# Patient Record
Sex: FEMALE | Race: BLACK OR AFRICAN AMERICAN | NOT HISPANIC OR LATINO | Employment: FULL TIME | URBAN - METROPOLITAN AREA
[De-identification: names, ages, dates, MRNs, and addresses within clinical notes are randomized per-mention and may not be internally consistent; named-entity substitution may affect disease eponyms.]

---

## 2017-01-19 ENCOUNTER — OFFICE VISIT (OUTPATIENT)
Dept: OBSTETRICS AND GYNECOLOGY | Facility: CLINIC | Age: 27
End: 2017-01-19

## 2017-01-19 VITALS
SYSTOLIC BLOOD PRESSURE: 114 MMHG | HEIGHT: 64 IN | BODY MASS INDEX: 47.97 KG/M2 | HEART RATE: 78 BPM | DIASTOLIC BLOOD PRESSURE: 78 MMHG | WEIGHT: 281 LBS

## 2017-01-19 DIAGNOSIS — N89.8 VAGINAL IRRITATION: Primary | ICD-10-CM

## 2017-01-19 DIAGNOSIS — A59.01 TRICHOMONIASIS OF VAGINA: ICD-10-CM

## 2017-01-19 PROCEDURE — 99213 OFFICE O/P EST LOW 20 MIN: CPT | Performed by: OBSTETRICS & GYNECOLOGY

## 2017-01-19 NOTE — MR AVS SNAPSHOT
Wanda Solis   2017 1:15 PM   Office Visit    Dept Phone:  [phone]   Encounter #:  [id number]    Provider:  Amor Michele MD   Department:  [facility]                Your Full Care Plan              Your Updated Medication List          This list is accurate as of: 17  4:28 PM.  Always use your most recent med list.                MICROGESTIN 1-20 MG-MCG per tablet   Generic drug:  norethindrone-ethinyl estradiol               We Performed the Following     NuSwab Vaginitis Plus       You Were Diagnosed With        Codes Comments    Vaginal irritation    -  Primary ICD-10-CM: N89.8  ICD-9-CM: 623.9     Trichomoniasis of vagina     ICD-10-CM: A59.01  ICD-9-CM: 131.01       Instructions     None    Patient Instructions History      Upcoming Appointments     Visit Type Date Time Department    GYN FOLLOW UP 2017  1:15 PM [facility] FABIÁNGYN ALEK SOLIS      [employer] Signup     [facility] [employer] allows you to send messages to your doctor, view your test results, renew your prescriptions, schedule appointments, and more. To sign up, go to [employer] and click on the Sign Up Now link in the New User? box. Enter your [employer] Activation Code exactly as it appears below along with the last four digits of your Social Security Number and your Date of Birth () to complete the sign-up process. If you do not sign up before the expiration date, you must request a new code.    [employer] Activation Code: [email or web address]  Expires: 2017  5:38 AM    If you have questions, you can email [email or web address] or call [phone] to talk to our [employer] staff. Remember, [employer] is NOT to be used for urgent needs. For medical emergencies, dial 911.               Other Info from Your Visit           Allergies     No Known Allergies      Reason for Visit     Follow-up LEIGHTON to Trich      Vital Signs     Blood Pressure Pulse Height Weight  "Last Menstrual Period Body Mass Index    114/78 78 64\" (162.6 cm) 281 lb (127 kg) 12/29/2016 (Approximate) 48.23 kg/m2    Smoking Status                   Never Smoker           Problems and Diagnoses Noted     Vaginal irritation    -  Primary    Trichomonas vaginitis            "

## 2017-01-19 NOTE — PROGRESS NOTES
"Colton Solis is a 26 y.o. female is here today for follow-up.    History of Present Illness     Pt here today to get test of cure for cure to Trich. Pt also c/o of vaginal itching today.     26 y.o. female with recent infection with Trichomoniasis   - here today for test of cure/re-infection  Has some irritation, possible brown discharge  No odor or other concerns.     Review of Systems   Constitutional: Negative for chills and fever.   Gastrointestinal: Negative for abdominal pain.   Genitourinary: Negative for pelvic pain, vaginal bleeding and vaginal discharge.       Objective    Visit Vitals   • /78   • Pulse 78   • Ht 64\" (162.6 cm)   • Wt 281 lb (127 kg)   • LMP 12/29/2016 (Approximate)   • BMI 48.23 kg/m2       Physical Exam   Constitutional: She appears well-developed and well-nourished. No distress.   Abdominal: Soft. Bowel sounds are normal. She exhibits no distension. There is no tenderness.   Genitourinary: Vagina normal and uterus normal. Pelvic exam was performed with patient prone. There is no lesion on the right labia. There is no lesion on the left labia. Uterus is not enlarged (retroverted), not fixed and not tender. Cervix exhibits no motion tenderness. Right adnexum displays no mass and no tenderness. Left adnexum displays no mass and no tenderness. No bleeding in the vagina. No vaginal discharge found.   Lymphadenopathy:        Right: No inguinal adenopathy present.        Left: No inguinal adenopathy present.         Assessment/Plan   Problems Addressed this Visit     None      Visit Diagnoses     Vaginal irritation    -  Primary    Relevant Orders    NuSwab Vaginitis Plus    Trichomoniasis of vagina        Relevant Orders    NuSwab Vaginitis Plus        Check NuSwab and treat based on results.     Amor Michele MD  1/19/2017  1:48 PM             "

## 2017-01-23 ENCOUNTER — TELEPHONE (OUTPATIENT)
Dept: OBSTETRICS AND GYNECOLOGY | Facility: CLINIC | Age: 27
End: 2017-01-23

## 2017-01-23 LAB
A VAGINAE DNA VAG QL NAA+PROBE: ABNORMAL SCORE
BVAB2 DNA VAG QL NAA+PROBE: ABNORMAL SCORE
C ALBICANS DNA VAG QL NAA+PROBE: POSITIVE
C GLABRATA DNA VAG QL NAA+PROBE: NEGATIVE
C TRACH RRNA SPEC QL NAA+PROBE: NEGATIVE
MEGA1 DNA VAG QL NAA+PROBE: ABNORMAL SCORE
N GONORRHOEA RRNA SPEC QL NAA+PROBE: NEGATIVE
T VAGINALIS RRNA SPEC QL NAA+PROBE: NEGATIVE

## 2017-01-23 RX ORDER — FLUCONAZOLE 150 MG/1
150 TABLET ORAL ONCE
Qty: 1 TABLET | Refills: 2 | Status: SHIPPED | OUTPATIENT
Start: 2017-01-23 | End: 2017-01-23

## 2017-01-23 NOTE — TELEPHONE ENCOUNTER
----- Message from Kalie Rodrigues sent at 1/23/2017 10:40 AM EST -----  Contact: Patient   Patient was notified of her culture results and RX.

## 2017-01-23 NOTE — TELEPHONE ENCOUNTER
----- Message from Amor Michele MD sent at 1/23/2017  9:52 AM EST -----  Wanda, her NuSwab shows yeast, medication sent to pharmacy. NO TRICH - Please let her know. Thanks Dr. Michele

## 2017-04-18 ENCOUNTER — OFFICE VISIT (OUTPATIENT)
Dept: OBSTETRICS AND GYNECOLOGY | Facility: CLINIC | Age: 27
End: 2017-04-18

## 2017-04-18 VITALS
HEART RATE: 93 BPM | WEIGHT: 290 LBS | BODY MASS INDEX: 49.51 KG/M2 | HEIGHT: 64 IN | SYSTOLIC BLOOD PRESSURE: 114 MMHG | DIASTOLIC BLOOD PRESSURE: 75 MMHG

## 2017-04-18 DIAGNOSIS — O20.0 THREATENED MISCARRIAGE: ICD-10-CM

## 2017-04-18 DIAGNOSIS — N89.8 VAGINAL DISCHARGE: Primary | ICD-10-CM

## 2017-04-18 PROCEDURE — 99214 OFFICE O/P EST MOD 30 MIN: CPT | Performed by: OBSTETRICS & GYNECOLOGY

## 2017-04-18 NOTE — PROGRESS NOTES
"Colton Solis is a 27 y.o. female c/o brown vaginal d/c for the past week. Pt only notices this when she wipes and looks like skin. Pt also had a positive pregnancy test on 04/10/2017.     History of Present Illness     27 y.o. female with one week history of brown discharge. No odor  No pelvic pain or cramping. Only notices when wiping   +UPT at home on 4/10/17     - LNMP 17 (cycles are regular, was on OCPs but not consistent with them) Would be 8 weeks 0 days by dates.     Meds - none  NKDA     Review of Systems   Constitutional: Negative for chills and fever.   Gastrointestinal: Negative for abdominal pain, nausea and vomiting.   Genitourinary: Positive for vaginal bleeding and vaginal discharge. Negative for pelvic pain.       Objective    /75  Pulse 93  Ht 64\" (162.6 cm)  Wt 290 lb (132 kg)  LMP 2017  Breastfeeding? No  BMI 49.78 kg/m2  Physical Exam   Constitutional: She appears well-developed and well-nourished. No distress.   Abdominal: Soft. Bowel sounds are normal. She exhibits no distension. There is no tenderness.   Genitourinary: Vagina normal and uterus normal. Pelvic exam was performed with patient prone. There is no lesion on the right labia. There is no lesion on the left labia. Uterus is not enlarged (difficult exam habitus and retroverted ), not fixed and not tender. Cervix exhibits no motion tenderness. Right adnexum displays no mass and no tenderness. Left adnexum displays no mass and no tenderness. No bleeding in the vagina. No vaginal discharge found.   Lymphadenopathy:        Right: No inguinal adenopathy present.        Left: No inguinal adenopathy present.         Assessment/Plan   Problems Addressed this Visit     None      Visit Diagnoses     Vaginal discharge    -  Primary    Relevant Orders    NuSwab VG+    Threatened miscarriage        Relevant Orders    HCG, B-subunit, Quantitative    Antibody Screen    ABO / Rh        1) brown " discharge  Check NuSwab  Treat based on results  2) Threatened miscarriage   Brown spotting as old blood?  Check ABO/Rh  Check HCG  Check ultrasound  Return for prenatal care.   Bleeding precautions reviewed.     Amor Michele MD  4/18/2017  12:03 PM

## 2017-04-19 ENCOUNTER — TELEPHONE (OUTPATIENT)
Dept: OBSTETRICS AND GYNECOLOGY | Facility: CLINIC | Age: 27
End: 2017-04-19

## 2017-04-19 LAB
ABO GROUP BLD: NORMAL
BLD GP AB SCN SERPL QL: NEGATIVE
HCG INTACT+B SERPL-ACNC: NORMAL MIU/ML
RH BLD: POSITIVE

## 2017-04-19 NOTE — TELEPHONE ENCOUNTER
----- Message from Wanda Chi MA sent at 4/19/2017  1:30 PM EDT -----  Nathan byrnes pt/young  ----- Message -----     From: Amor Michele MD     Sent: 4/19/2017  12:35 PM       To: KAJAL Estrella, her HCG is good in range for 5-7 weeks. Please let her know. Thanks Dr. Michele

## 2017-04-24 LAB
A VAGINAE DNA VAG QL NAA+PROBE: ABNORMAL SCORE
BVAB2 DNA VAG QL NAA+PROBE: ABNORMAL SCORE
C ALBICANS DNA VAG QL NAA+PROBE: NEGATIVE
C GLABRATA DNA VAG QL NAA+PROBE: NEGATIVE
C TRACH RRNA SPEC QL NAA+PROBE: NEGATIVE
MEGA1 DNA VAG QL NAA+PROBE: ABNORMAL SCORE
N GONORRHOEA RRNA SPEC QL NAA+PROBE: NEGATIVE
T VAGINALIS RRNA SPEC QL NAA+PROBE: NEGATIVE

## 2017-04-24 RX ORDER — METRONIDAZOLE 500 MG/1
500 TABLET ORAL 2 TIMES DAILY
Qty: 14 TABLET | Refills: 4 | Status: SHIPPED | OUTPATIENT
Start: 2017-04-24 | End: 2017-05-01

## 2017-04-25 ENCOUNTER — TELEPHONE (OUTPATIENT)
Dept: OBSTETRICS AND GYNECOLOGY | Facility: CLINIC | Age: 27
End: 2017-04-25

## 2017-04-25 ENCOUNTER — INITIAL PRENATAL (OUTPATIENT)
Dept: OBSTETRICS AND GYNECOLOGY | Facility: CLINIC | Age: 27
End: 2017-04-25

## 2017-04-25 ENCOUNTER — PROCEDURE VISIT (OUTPATIENT)
Dept: OBSTETRICS AND GYNECOLOGY | Facility: CLINIC | Age: 27
End: 2017-04-25

## 2017-04-25 VITALS — BODY MASS INDEX: 49.78 KG/M2 | DIASTOLIC BLOOD PRESSURE: 73 MMHG | SYSTOLIC BLOOD PRESSURE: 111 MMHG | WEIGHT: 290 LBS

## 2017-04-25 DIAGNOSIS — Z34.91 UNCERTAIN DATES, ANTEPARTUM, FIRST TRIMESTER: Primary | ICD-10-CM

## 2017-04-25 DIAGNOSIS — Z78.9 VARICELLA VACCINATION STATUS UNKNOWN: ICD-10-CM

## 2017-04-25 DIAGNOSIS — Z34.81 ENCOUNTER FOR SUPERVISION OF OTHER NORMAL PREGNANCY IN FIRST TRIMESTER: Primary | ICD-10-CM

## 2017-04-25 DIAGNOSIS — Z13.0 SCREENING FOR SICKLE-CELL DISEASE OR TRAIT: ICD-10-CM

## 2017-04-25 DIAGNOSIS — O99.211 OBESITY COMPLICATING PREGNANCY IN FIRST TRIMESTER: ICD-10-CM

## 2017-04-25 DIAGNOSIS — O99.211 OBESITY IN PREGNANCY, ANTEPARTUM, FIRST TRIMESTER: ICD-10-CM

## 2017-04-25 PROCEDURE — 76817 TRANSVAGINAL US OBSTETRIC: CPT | Performed by: OBSTETRICS & GYNECOLOGY

## 2017-04-25 PROCEDURE — 0501F PRENATAL FLOW SHEET: CPT | Performed by: OBSTETRICS & GYNECOLOGY

## 2017-04-25 RX ORDER — MAGNESIUM HYDROXIDE 400 MG/5ML
1 SUSPENSION, ORAL (FINAL DOSE FORM) ORAL DAILY
Qty: 30 CAPSULE | Refills: 11 | Status: SHIPPED | OUTPATIENT
Start: 2017-04-25 | End: 2017-05-25

## 2017-04-25 NOTE — PROGRESS NOTES
Initial ob visit     CC- Here for care of pregnancy     Wanda Solis is being seen today for her first obstetrical visit.  She is a 27 y.o.    9w0d gestation.     #: 1, Date: None, Sex: None, Weight: None, GA: None, Delivery: None, Apgar1: None, Apgar5: None, Living: None, Birth Comments: None    #: 2, Date: None, Sex: None, Weight: None, GA: None, Delivery: None, Apgar1: None, Apgar5: None, Living: None, Birth Comments: None    #: 3, Date: None, Sex: None, Weight: None, GA: None, Delivery: None, Apgar1: None, Apgar5: None, Living: None, Birth Comments: None    #: 4, Date: 09, Sex: Female, Weight: 7 lb 8 oz (3.402 kg), GA: 40w0d, Delivery: Vaginal, Spontaneous Delivery, Apgar1: None, Apgar5: None, Living: Yes, Birth Comments: None    #: 5, Date: None, Sex: None, Weight: None, GA: None, Delivery: None, Apgar1: None, Apgar5: None, Living: None, Birth Comments: None      Current obstetric complaints : none   Prior obstetric issues, potential pregnancy concerns: none  Family history of genetic issues (includes FOB): none  Prior infections concerning in pregnancy (Rash, fever in last 2 weeks): Trich in the past  Varicella Hx -negative history   Prior testing for Cystic Fibrosis Carrier or Sickle Cell Trait- unknown status  Prepregnancy BMI - Body mass index is 49.78 kg/(m^2).    Past Medical History:   Diagnosis Date   • Abnormal Pap smear of cervix    • Urogenital trichomoniasis        Past Surgical History:   Procedure Laterality Date   • DILATATION AND CURETTAGE     • WISDOM TOOTH EXTRACTION           Current Outpatient Prescriptions:   •  metroNIDAZOLE (FLAGYL) 500 MG tablet, Take 1 tablet by mouth 2 (Two) Times a Day for 7 days., Disp: 14 tablet, Rfl: 4    No Known Allergies    Social History     Social History   • Marital status: Unknown     Spouse name: N/A   • Number of children: N/A   • Years of education: N/A     Occupational History   • Not on file.     Social History Main Topics   • Smoking  status: Never Smoker   • Smokeless tobacco: Never Used   • Alcohol use No   • Drug use: No   • Sexual activity: Yes     Partners: Male     Birth control/ protection: None     Other Topics Concern   • Not on file     Social History Narrative       Family History   Problem Relation Age of Onset   • Breast cancer Neg Hx    • Ovarian cancer Neg Hx    • Uterine cancer Neg Hx    • Colon cancer Neg Hx    • Deep vein thrombosis Neg Hx    • Pulmonary embolism Neg Hx        Review of systems     A comprehensive review of systems was negative.     Objective    Wt 290 lb (132 kg)  LMP 02/21/2017  BMI 49.78 kg/m2      General Appearance:    Alert, cooperative, in no acute distress, habitus obese   Head:    Normocephalic, without obvious abnormality, atraumatic   Eyes:            Lids and lashes normal, conjunctivae and sclerae normal, no   icterus, no pallor, corneas clear   Ears:    Ears appear intact with no abnormalities noted       Neck:   No adenopathy, supple, trachea midline, no thyromegaly   Back:     No kyphosis present, no scoliosis present,                       Lungs:     Clear to auscultation,respirations regular, even and                   unlabored    Heart:    Regular rhythm and normal rate, normal S1 and S2, no            murmur, no gallop, no rub, no click   Breast Exam:    No masses, No nipple discharge   Abdomen:     Normal bowel sounds, no masses, no organomegaly, soft        non-tender, non-distended, no guarding, no rebound                 tenderness   Genitalia:    Vulva - BUS-WNL, NEFG    Vagina - No discharge, No bleeding    Cervix - No Lesions, closed     Uterus - Consistent with 7 weeks    Adnexa - No augustine, NT    Pelvimetry - clinically adequate, gynecoid pelvis     Extremities:   Moves all extremities well, no edema, no cyanosis, no              redness   Pulses:   Pulses palpable and equal bilaterally   Skin:   No bleeding, bruising or rash   Lymph nodes:   No palpable adenopathy   Neurologic:    Sensation intact, A&O times 3      Assessment    1) Pregnancy at 5w6d  2) Obesity in pregnancy, limit weight gain          Plan    Initial labs drawn, GC/CHL screen done  Patient is on Prenatal vitamins  Problem list reviewed and updated.  Reviewed routine prenatal care with the office to include but not limited to   Zika (travel restrictions/ok to use insect repellant), not to changing cat litter, food restrictions, avoidance of alcohol, tobacco and drugs and saunas/hot tubs.   All questions answered.     Amor Michele MD   4/25/2017  10:11 AM

## 2017-04-25 NOTE — TELEPHONE ENCOUNTER
----- Message from Amor Michele MD sent at 4/24/2017  4:45 PM EDT -----  Alin Muñoz - + for BV, sent Rx to pharmacy. Please let her know. Thanks Dr. Michele

## 2017-04-26 LAB
ABO GROUP BLD: (no result)
BASOPHILS # BLD AUTO: 0 X10E3/UL (ref 0–0.2)
BASOPHILS NFR BLD AUTO: 0 %
BLD GP AB SCN SERPL QL: NEGATIVE
EOSINOPHIL # BLD AUTO: 0.2 X10E3/UL (ref 0–0.4)
EOSINOPHIL NFR BLD AUTO: 2 %
ERYTHROCYTE [DISTWIDTH] IN BLOOD BY AUTOMATED COUNT: 15.2 % (ref 12.3–15.4)
HBV SURFACE AG SERPL QL IA: NEGATIVE
HCT VFR BLD AUTO: 38 % (ref 34–46.6)
HGB A MFR BLD: 54.9 % (ref 94–98)
HGB A2 MFR BLD COLUMN CHROM: 3.6 % (ref 0.7–3.1)
HGB BLD-MCNC: 13.1 G/DL (ref 11.1–15.9)
HGB C MFR BLD: 41.5 %
HGB F MFR BLD: 0 % (ref 0–2)
HGB FRACT BLD-IMP: ABNORMAL
HGB S BLD QL SOLY: NEGATIVE
HGB S MFR BLD: 0 %
HIV 1+2 AB+HIV1 P24 AG SERPL QL IA: NON REACTIVE
IMM GRANULOCYTES # BLD: 0 X10E3/UL (ref 0–0.1)
IMM GRANULOCYTES NFR BLD: 0 %
LYMPHOCYTES # BLD AUTO: 2.1 X10E3/UL (ref 0.7–3.1)
LYMPHOCYTES NFR BLD AUTO: 29 %
MCH RBC QN AUTO: 28.2 PG (ref 26.6–33)
MCHC RBC AUTO-ENTMCNC: 34.5 G/DL (ref 31.5–35.7)
MCV RBC AUTO: 82 FL (ref 79–97)
MONOCYTES # BLD AUTO: 0.5 X10E3/UL (ref 0.1–0.9)
MONOCYTES NFR BLD AUTO: 6 %
MORPHOLOGY BLD-IMP: (no result)
NEUTROPHILS # BLD AUTO: 4.6 X10E3/UL (ref 1.4–7)
NEUTROPHILS NFR BLD AUTO: 63 %
PLATELET # BLD AUTO: 276 X10E3/UL (ref 150–379)
RBC # BLD AUTO: 4.64 X10E6/UL (ref 3.77–5.28)
RH BLD: POSITIVE
RPR SER QL: NON REACTIVE
RUBV IGG SERPL IA-ACNC: 2.77 INDEX
VZV IGG SER IA-ACNC: <135 INDEX
WBC # BLD AUTO: 7.4 X10E3/UL (ref 3.4–10.8)

## 2017-04-27 ENCOUNTER — TELEPHONE (OUTPATIENT)
Dept: OBSTETRICS AND GYNECOLOGY | Facility: CLINIC | Age: 27
End: 2017-04-27

## 2017-04-27 PROBLEM — D58.2 HEMOGLOBIN C TRAIT (HCC): Status: ACTIVE | Noted: 2017-04-27

## 2017-04-27 NOTE — TELEPHONE ENCOUNTER
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 Need to check FOB for sickle trait if possible. Left message for her to call office. Thanks Dr. Michele

## 2017-05-30 ENCOUNTER — ROUTINE PRENATAL (OUTPATIENT)
Dept: OBSTETRICS AND GYNECOLOGY | Facility: CLINIC | Age: 27
End: 2017-05-30

## 2017-05-30 ENCOUNTER — PROCEDURE VISIT (OUTPATIENT)
Dept: OBSTETRICS AND GYNECOLOGY | Facility: CLINIC | Age: 27
End: 2017-05-30

## 2017-05-30 VITALS — BODY MASS INDEX: 49.44 KG/M2 | WEIGHT: 288 LBS | SYSTOLIC BLOOD PRESSURE: 119 MMHG | DIASTOLIC BLOOD PRESSURE: 79 MMHG

## 2017-05-30 DIAGNOSIS — Z34.81 ENCOUNTER FOR SUPERVISION OF OTHER NORMAL PREGNANCY IN FIRST TRIMESTER: Primary | ICD-10-CM

## 2017-05-30 DIAGNOSIS — O36.80X0 ENCOUNTER TO DETERMINE FETAL VIABILITY OF PREGNANCY, NOT APPLICABLE OR UNSPECIFIED FETUS: Primary | ICD-10-CM

## 2017-05-30 PROCEDURE — 0502F SUBSEQUENT PRENATAL CARE: CPT | Performed by: OBSTETRICS & GYNECOLOGY

## 2017-05-30 PROCEDURE — 76817 TRANSVAGINAL US OBSTETRIC: CPT | Performed by: OBSTETRICS & GYNECOLOGY

## 2017-05-30 RX ORDER — ASCORBIC ACID, CHOLECALCIFEROL, .ALPHA.-TOCOPHEROL, DL-, FOLIC ACID, PYRIDOXINE HYDROCHLORIDE, CYANOCOBALAMIN, CALCIUM FORMATE, FERROUS ASPARTO GLYCINATE, MAGNESIUM OXIDE AND DOCONEXENT 90; 400; 40; 1; 26; 25; 155; 18; 50; 300 MG/1; [IU]/1; [IU]/1; MG/1; MG/1; UG/1; MG/1; MG/1; MG/1; MG/1
CAPSULE, GELATIN COATED ORAL
COMMUNITY
Start: 2017-04-25 | End: 2017-11-16 | Stop reason: HOSPADM

## 2017-05-30 RX ORDER — ONDANSETRON 4 MG/1
4 TABLET, FILM COATED ORAL EVERY 8 HOURS PRN
Qty: 30 TABLET | Refills: 3 | Status: SHIPPED | OUTPATIENT
Start: 2017-05-30 | End: 2017-06-27

## 2017-05-30 RX ORDER — METRONIDAZOLE 7.5 MG/G
GEL VAGINAL 2 TIMES DAILY
Qty: 70 G | Refills: 3 | Status: SHIPPED | OUTPATIENT
Start: 2017-05-30 | End: 2017-06-27

## 2017-06-08 ENCOUNTER — TELEPHONE (OUTPATIENT)
Dept: OBSTETRICS AND GYNECOLOGY | Facility: CLINIC | Age: 27
End: 2017-06-08

## 2017-06-08 NOTE — TELEPHONE ENCOUNTER
Wanda,     Medication sent to the pharmacy.     Thanks   Dr. Michele    ---- Message from Meño Billy sent at 6/8/2017  3:38 PM EDT -----  PT called to report a yeast infection possibly following antibiotics that you prescribed. Inquired about having you call her in a prescription to her pharmacy. Pharmacy confirmed.

## 2017-06-27 ENCOUNTER — ROUTINE PRENATAL (OUTPATIENT)
Dept: OBSTETRICS AND GYNECOLOGY | Facility: CLINIC | Age: 27
End: 2017-06-27

## 2017-06-27 VITALS — DIASTOLIC BLOOD PRESSURE: 75 MMHG | BODY MASS INDEX: 50.12 KG/M2 | SYSTOLIC BLOOD PRESSURE: 127 MMHG | WEIGHT: 292 LBS

## 2017-06-27 DIAGNOSIS — Z36.9 ANTENATAL SCREENING ENCOUNTER: ICD-10-CM

## 2017-06-27 DIAGNOSIS — Z34.81 ENCOUNTER FOR SUPERVISION OF OTHER NORMAL PREGNANCY IN FIRST TRIMESTER: Primary | ICD-10-CM

## 2017-06-27 PROCEDURE — 0502F SUBSEQUENT PRENATAL CARE: CPT | Performed by: OBSTETRICS & GYNECOLOGY

## 2017-06-27 NOTE — PROGRESS NOTES
OB follow up     Wanda Solis is a 27 y.o.  14w6d being seen today for her obstetrical visit.  Patient reports boil in between her breast. Boil is draining and has an odor.  Fetal movement: absent.    Review of Systems  No bleeding, No cramping/contractions     /75  Wt 292 lb (132 kg)  LMP 2017  BMI 50.12 kg/m2    FHT: 166 BPM   Uterine Size: size equals dates       Assessment    1) pregnancy at 14w6d   Quickening reviewed.   Down syndrome testing, desires  Anatomy scan   2) Area between breasts looks like skin tag with secondary infection/inflammation.   Encouraged to treat after pregnancy         Plan    Reviewed this stage of pregnancy  Problem list updated   Follow up in 4 weeks    Amor Michele MD   2017  2:55 PM

## 2017-07-03 LAB
CHR X + Y ANEUP PLAS.CFDNA: ABNORMAL
CITATION REF LAB TEST: ABNORMAL
FET 13+18+21+X+Y ANEUP PLAS.CFDNA: ABNORMAL
FET CHR 13 TS PLAS.CFDNA QL: ABNORMAL
FET CHR 18 TS PLAS.CFDNA QL: ABNORMAL
FET CHR 21 TS PLAS.CFDNA QL: ABNORMAL
GENETIC ALGORITHM SENSITIVITY: ABNORMAL %
LAB DIRECTOR NAME PROVIDER: ABNORMAL
Lab: ABNORMAL
REF LAB TEST METHOD: ABNORMAL
SERVICE CMNT 02-IMP: ABNORMAL
SERVICE CMNT-IMP: ABNORMAL

## 2017-07-05 ENCOUNTER — TELEPHONE (OUTPATIENT)
Dept: OBSTETRICS AND GYNECOLOGY | Facility: CLINIC | Age: 27
End: 2017-07-05

## 2017-07-05 NOTE — TELEPHONE ENCOUNTER
----- Message from Wanda Chi MA sent at 7/3/2017  2:12 PM EDT -----  Nathan byrnes pt/young  ----- Message -----     From: Amor Michele MD     Sent: 7/3/2017  12:40 PM       To: KAJAL Estrella, let her know the down syndrome did not run (fetal DNA fraction too small), she can do MSAFP-4 at this point if desires. Thanks Dr. Michele

## 2017-07-07 ENCOUNTER — TELEPHONE (OUTPATIENT)
Dept: OBSTETRICS AND GYNECOLOGY | Facility: CLINIC | Age: 27
End: 2017-07-07

## 2017-07-07 NOTE — TELEPHONE ENCOUNTER
Please let patient know that she can make a sooner appointment with another provider for a vaginal swab to rule out infection since Dr. Michele is out next week or she can see him when he's back but she will need a swab to rule out infection.        Pt called in having a brown discharge (no odor) for about a week wants to know what she should do about the issue. Pt is a OB pt of Dr. Michele. Pt can be reached at [phone].    ANA METCALF [[id number]]      Thanks-    duyen

## 2017-07-07 NOTE — TELEPHONE ENCOUNTER
Pt is aware she will need a appt for the vaginal swab. She has carlos a appt with Dr. Osei 7/14/2017.    Thank you-      Mignon

## 2017-07-27 ENCOUNTER — PROCEDURE VISIT (OUTPATIENT)
Dept: OBSTETRICS AND GYNECOLOGY | Facility: CLINIC | Age: 27
End: 2017-07-27

## 2017-07-27 DIAGNOSIS — Z36.89 SCREENING, ANTENATAL, FOR FETAL ANATOMIC SURVEY: Primary | ICD-10-CM

## 2017-07-27 DIAGNOSIS — O99.212 OBESITY DURING PREGNANCY IN SECOND TRIMESTER: ICD-10-CM

## 2017-07-27 PROCEDURE — 76805 OB US >/= 14 WKS SNGL FETUS: CPT | Performed by: OBSTETRICS & GYNECOLOGY

## 2017-08-02 ENCOUNTER — ROUTINE PRENATAL (OUTPATIENT)
Dept: OBSTETRICS AND GYNECOLOGY | Facility: CLINIC | Age: 27
End: 2017-08-02

## 2017-08-02 VITALS — DIASTOLIC BLOOD PRESSURE: 69 MMHG | SYSTOLIC BLOOD PRESSURE: 123 MMHG | BODY MASS INDEX: 50.29 KG/M2 | WEIGHT: 293 LBS

## 2017-08-02 DIAGNOSIS — O26.892 VAGINAL DISCHARGE IN PREGNANCY IN SECOND TRIMESTER: ICD-10-CM

## 2017-08-02 DIAGNOSIS — N89.8 VAGINAL DISCHARGE IN PREGNANCY IN SECOND TRIMESTER: ICD-10-CM

## 2017-08-02 DIAGNOSIS — O99.212 OBESITY DURING PREGNANCY IN SECOND TRIMESTER: Primary | ICD-10-CM

## 2017-08-02 DIAGNOSIS — Z34.82 ENCOUNTER FOR SUPERVISION OF OTHER NORMAL PREGNANCY IN SECOND TRIMESTER: ICD-10-CM

## 2017-08-02 PROCEDURE — 0502F SUBSEQUENT PRENATAL CARE: CPT | Performed by: OBSTETRICS & GYNECOLOGY

## 2017-08-02 NOTE — PROGRESS NOTES
"OB follow up     Wanda Solis is a 27 y.o.  20w0d being seen today for her obstetrical visit.  Patient reports vaginal d/c . Pt thinks she may have a bacterial infection. Pt also c/o a \"boil\" on the inside of the R labia. . Fetal movement: normal.    Review of Systems  No bleeding, No cramping/contractions     /69  Wt 293 lb (133 kg)  LMP 2017  BMI 50.29 kg/m2    FHT: 152 BPM   Uterine Size: size equals dates       Assessment    1) pregnancy at 20w0d   Anatomy scan reviewed, normal.   Had cell free DNA testing run, but unable to do because of to small a fetal fraction  Offered MSAFP-4, not done  2) Obesity, weight stable.   3) Vaginal discharge in pregnancy, Nuswab done.   Treat per results.         Plan    Reviewed this stage of pregnancy  Problem list updated   Follow up in 4 weeks    Aomr Michlee MD   2017  9:28 AM    "

## 2017-08-05 RX ORDER — METRONIDAZOLE 500 MG/1
500 TABLET ORAL 2 TIMES DAILY
Qty: 14 TABLET | Refills: 4 | Status: SHIPPED | OUTPATIENT
Start: 2017-08-05 | End: 2017-08-12

## 2017-08-07 ENCOUNTER — TELEPHONE (OUTPATIENT)
Dept: OBSTETRICS AND GYNECOLOGY | Facility: CLINIC | Age: 27
End: 2017-08-07

## 2017-08-07 RX ORDER — METRONIDAZOLE 7.5 MG/G
GEL VAGINAL 2 TIMES DAILY
Qty: 70 G | Refills: 3 | Status: SHIPPED | OUTPATIENT
Start: 2017-08-07 | End: 2017-08-30

## 2017-08-07 NOTE — TELEPHONE ENCOUNTER
----- Message from Wanda Chi MA sent at 8/7/2017 11:34 AM EDT -----  Nathan byrnes pt/young  ----- Message -----     From: Amor Michele MD     Sent: 8/5/2017   4:19 PM       To: KAJAL Estrella, [facility] Medication sent to the pharmacy. Please let her know. Thanks Dr. Michele

## 2017-08-07 NOTE — TELEPHONE ENCOUNTER
Pt is aware.    Can you send the cream please.    Thanks    Wanda Muñoz,     I sent in the cream.     Thanks   Dr. Michele

## 2017-08-22 ENCOUNTER — TELEPHONE (OUTPATIENT)
Dept: OBSTETRICS AND GYNECOLOGY | Facility: CLINIC | Age: 27
End: 2017-08-22

## 2017-08-22 NOTE — TELEPHONE ENCOUNTER
Wanda,     I sent terazol. She is pregnant and I do not use diflucan in pregnancy.     Thanks   Dr. Michele    ----- Message from Ann-Marie Mason sent at 8/22/2017  1:00 PM EDT -----  Contact: Pt  Pt called to request rx for Diflucan.    Pt # [phone]

## 2017-08-22 NOTE — TELEPHONE ENCOUNTER
Wanda,     Sent in yeast cream  Please let her know    Thanks   Dr. Michele    ----- Message from Lizet Worley sent at 8/21/2017 10:41 AM EDT -----  Pt requesting RX for yeast infection, please advise.

## 2017-08-30 ENCOUNTER — ROUTINE PRENATAL (OUTPATIENT)
Dept: OBSTETRICS AND GYNECOLOGY | Facility: CLINIC | Age: 27
End: 2017-08-30

## 2017-08-30 VITALS — WEIGHT: 290 LBS | SYSTOLIC BLOOD PRESSURE: 115 MMHG | DIASTOLIC BLOOD PRESSURE: 71 MMHG | BODY MASS INDEX: 49.78 KG/M2

## 2017-08-30 DIAGNOSIS — Z34.82 ENCOUNTER FOR SUPERVISION OF OTHER NORMAL PREGNANCY IN SECOND TRIMESTER: Primary | ICD-10-CM

## 2017-08-30 DIAGNOSIS — Z36.9 ANTENATAL SCREENING ENCOUNTER: ICD-10-CM

## 2017-08-30 PROCEDURE — 0502F SUBSEQUENT PRENATAL CARE: CPT | Performed by: OBSTETRICS & GYNECOLOGY

## 2017-08-30 NOTE — PROGRESS NOTES
OB follow up     Wanda Solis is a 27 y.o.  24w0d being seen today for her obstetrical visit.  Patient reports no complaints. Fetal movement: normal.    Review of Systems  No bleeding, No cramping/contractions     /71  Wt 290 lb (132 kg)  LMP 2017  BMI 49.78 kg/m2    FHT: 156 BPM   Uterine Size: size equals dates       Assessment    1) pregnancy at 24w0d   Rh+, Glucola ordered  2) Obesity   Weight stable in pregnancy         Plan    Reviewed this stage of pregnancy  Problem list updated   Follow up in 3 weeks    Amor Michele MD   2017  12:18 PM

## 2017-09-20 ENCOUNTER — RESULTS ENCOUNTER (OUTPATIENT)
Dept: OBSTETRICS AND GYNECOLOGY | Facility: CLINIC | Age: 27
End: 2017-09-20

## 2017-09-20 ENCOUNTER — ROUTINE PRENATAL (OUTPATIENT)
Dept: OBSTETRICS AND GYNECOLOGY | Facility: CLINIC | Age: 27
End: 2017-09-20

## 2017-09-20 VITALS — WEIGHT: 292 LBS | BODY MASS INDEX: 50.12 KG/M2 | DIASTOLIC BLOOD PRESSURE: 73 MMHG | SYSTOLIC BLOOD PRESSURE: 110 MMHG

## 2017-09-20 DIAGNOSIS — Z34.82 ENCOUNTER FOR SUPERVISION OF OTHER NORMAL PREGNANCY IN SECOND TRIMESTER: Primary | ICD-10-CM

## 2017-09-20 DIAGNOSIS — O99.212 OBESITY DURING PREGNANCY IN SECOND TRIMESTER: ICD-10-CM

## 2017-09-20 DIAGNOSIS — Z36.9 ANTENATAL SCREENING ENCOUNTER: ICD-10-CM

## 2017-09-20 LAB — GLUCOSE 1H P 50 G GLC PO SERPL-MCNC: 136 MG/DL (ref 65–139)

## 2017-09-20 PROCEDURE — 0502F SUBSEQUENT PRENATAL CARE: CPT | Performed by: OBSTETRICS & GYNECOLOGY

## 2017-09-20 NOTE — PROGRESS NOTES
OB follow up     Wanda Solis is a 27 y.o.  27w0d being seen today for her obstetrical visit.  Patient reports no complaints. Fetal movement: normal.    Review of Systems  No bleeding, No cramping/contractions     /73  Wt 292 lb (132 kg)  LMP 2017  BMI 50.12 kg/m2    FHT: 134 BPM   Uterine Size: size equals dates       Assessment    1) pregnancy at 27w0d   Rh+, Glucola today  Peds, prenatal classes, tours, TDaP/flu shots, and breast feeding, anesthesia and Labor/Delivery questions  2) Considering sterilization - reviewed  3) Obesity, weight stable.         Plan    Reviewed this stage of pregnancy  Problem list updated   Follow up in 2 weeks    Amor Michele MD   2017  10:05 AM

## 2017-09-21 ENCOUNTER — TELEPHONE (OUTPATIENT)
Dept: OBSTETRICS AND GYNECOLOGY | Facility: CLINIC | Age: 27
End: 2017-09-21

## 2017-09-21 NOTE — TELEPHONE ENCOUNTER
----- Message from Amor Michele MD sent at 9/21/2017 10:25 AM EDT -----  Wanda, Her 1 hour is borderline. I want her to go ahead and do a 3 hour. Please advise and arrange. Thanks Dr. Michele

## 2017-10-04 ENCOUNTER — ROUTINE PRENATAL (OUTPATIENT)
Dept: OBSTETRICS AND GYNECOLOGY | Facility: CLINIC | Age: 27
End: 2017-10-04

## 2017-10-04 VITALS — SYSTOLIC BLOOD PRESSURE: 113 MMHG | WEIGHT: 293 LBS | BODY MASS INDEX: 50.29 KG/M2 | DIASTOLIC BLOOD PRESSURE: 78 MMHG

## 2017-10-04 DIAGNOSIS — O99.810 ABNORMAL GLUCOSE AFFECTING PREGNANCY: Primary | ICD-10-CM

## 2017-10-04 DIAGNOSIS — Z34.83 ENCOUNTER FOR SUPERVISION OF OTHER NORMAL PREGNANCY IN THIRD TRIMESTER: Primary | ICD-10-CM

## 2017-10-04 LAB
GLUCOSE 1H P 100 G GLC PO SERPL-MCNC: 144 MG/DL (ref 40–300)
GLUCOSE 2H P 100 G GLC PO SERPL-MCNC: 107 MG/DL (ref 40–300)
GLUCOSE 3H P 100 G GLC PO SERPL-MCNC: 94 MG/DL (ref 40–300)
GLUCOSE P FAST SERPL-MCNC: 79 MG/DL

## 2017-10-04 PROCEDURE — 0502F SUBSEQUENT PRENATAL CARE: CPT | Performed by: OBSTETRICS & GYNECOLOGY

## 2017-10-04 NOTE — PROGRESS NOTES
OB follow up     Wanda Solis is a 27 y.o.  29w0d being seen today for her obstetrical visit.  Patient reports no complaints. Fetal movement: normal.    Review of Systems  No bleeding, No cramping/contractions     /78  Wt 293 lb (133 kg)  LMP 2017  BMI 50.29 kg/m2    FHT: 154 BPM   Uterine Size: size equals dates       Assessment    1) pregnancy at 29w0d   2) 1 hour abnormal, testing, 3 hr GTT today.   3) Declines flu vaccine  4) Obesity - weight stable.         Plan    Reviewed this stage of pregnancy  Problem list updated   Follow up in 2 weeks    Amor Michele MD   10/4/2017  10:05 AM

## 2017-10-05 ENCOUNTER — TELEPHONE (OUTPATIENT)
Dept: OBSTETRICS AND GYNECOLOGY | Facility: CLINIC | Age: 27
End: 2017-10-05

## 2017-10-05 NOTE — TELEPHONE ENCOUNTER
----- Message from Amor Michele MD sent at 10/5/2017 10:26 AM EDT -----  Wanda, she passed her 3 hour. Please let her know. Thanks Dr. Michele

## 2017-10-18 ENCOUNTER — ROUTINE PRENATAL (OUTPATIENT)
Dept: OBSTETRICS AND GYNECOLOGY | Facility: CLINIC | Age: 27
End: 2017-10-18

## 2017-10-18 VITALS — WEIGHT: 293 LBS | DIASTOLIC BLOOD PRESSURE: 79 MMHG | BODY MASS INDEX: 50.46 KG/M2 | SYSTOLIC BLOOD PRESSURE: 113 MMHG

## 2017-10-18 DIAGNOSIS — Z34.83 ENCOUNTER FOR SUPERVISION OF OTHER NORMAL PREGNANCY IN THIRD TRIMESTER: Primary | ICD-10-CM

## 2017-10-18 DIAGNOSIS — O99.213 OBESITY IN PREGNANCY, ANTEPARTUM, THIRD TRIMESTER: ICD-10-CM

## 2017-10-18 PROCEDURE — 0502F SUBSEQUENT PRENATAL CARE: CPT | Performed by: OBSTETRICS & GYNECOLOGY

## 2017-10-18 NOTE — PROGRESS NOTES
OB follow up     Wanda Solis is a 27 y.o.  31w0d being seen today for her obstetrical visit.  Patient reports trouble breathing at night. . Fetal movement: normal.    Review of Systems  No bleeding, No cramping/contractions     /79  Wt 294 lb (133 kg)  LMP 2017  BMI 50.46 kg/m2    FHT: 136 BPM   Uterine Size: size equals dates       Assessment    1) pregnancy at 31w0d   Passed 3 hour.   2) Obesity, good weight gain, but check growth with next visit.         Plan    Reviewed this stage of pregnancy  Problem list updated   Follow up in 2 weeks    Amor Michele MD   10/18/2017  10:00 AM

## 2017-11-02 ENCOUNTER — ROUTINE PRENATAL (OUTPATIENT)
Dept: OBSTETRICS AND GYNECOLOGY | Facility: CLINIC | Age: 27
End: 2017-11-02

## 2017-11-02 ENCOUNTER — PROCEDURE VISIT (OUTPATIENT)
Dept: OBSTETRICS AND GYNECOLOGY | Facility: CLINIC | Age: 27
End: 2017-11-02

## 2017-11-02 VITALS — DIASTOLIC BLOOD PRESSURE: 72 MMHG | WEIGHT: 292 LBS | SYSTOLIC BLOOD PRESSURE: 116 MMHG | BODY MASS INDEX: 50.12 KG/M2

## 2017-11-02 DIAGNOSIS — O99.212 OBESITY AFFECTING PREGNANCY IN SECOND TRIMESTER: ICD-10-CM

## 2017-11-02 DIAGNOSIS — O99.213 OBESITY AFFECTING PREGNANCY IN THIRD TRIMESTER: ICD-10-CM

## 2017-11-02 DIAGNOSIS — Z34.83 ENCOUNTER FOR SUPERVISION OF OTHER NORMAL PREGNANCY IN THIRD TRIMESTER: Primary | ICD-10-CM

## 2017-11-02 DIAGNOSIS — Z36.89 ENCOUNTER FOR ULTRASOUND TO CHECK FETAL GROWTH: Primary | ICD-10-CM

## 2017-11-02 DIAGNOSIS — O99.213 OBESITY IN PREGNANCY, ANTEPARTUM, THIRD TRIMESTER: ICD-10-CM

## 2017-11-02 PROCEDURE — 76816 OB US FOLLOW-UP PER FETUS: CPT | Performed by: OBSTETRICS & GYNECOLOGY

## 2017-11-02 PROCEDURE — 0502F SUBSEQUENT PRENATAL CARE: CPT | Performed by: OBSTETRICS & GYNECOLOGY

## 2017-11-02 NOTE — PROGRESS NOTES
OB follow up     Wanda Solis is a 27 y.o.  33w1d being seen today for her obstetrical visit.  Patient reports nausea and vomiting. Fetal movement: normal.    Review of Systems  No bleeding, No cramping/contractions     /72  Wt 292 lb (132 kg)  LMP 2017  BMI 50.12 kg/m2    FHT: 156 BPM   Uterine Size: size equals dates       Assessment    1) pregnancy at 33w1d   Growth today 48%, A/C 50%, normal PROSPER, cephalic   2) Obesity - weight gain excellent.   3) watch proteinuria.       Plan    Reviewed this stage of pregnancy  Problem list updated   Follow up in 2 weeks    Amor Michele MD   2017  2:13 PM

## 2017-11-16 ENCOUNTER — ROUTINE PRENATAL (OUTPATIENT)
Dept: OBSTETRICS AND GYNECOLOGY | Facility: CLINIC | Age: 27
End: 2017-11-16

## 2017-11-16 VITALS — DIASTOLIC BLOOD PRESSURE: 66 MMHG | SYSTOLIC BLOOD PRESSURE: 107 MMHG | WEIGHT: 293 LBS | BODY MASS INDEX: 50.98 KG/M2

## 2017-11-16 DIAGNOSIS — O99.213 OBESITY AFFECTING PREGNANCY IN THIRD TRIMESTER: Primary | ICD-10-CM

## 2017-11-16 DIAGNOSIS — Z36.9 ANTENATAL SCREENING ENCOUNTER: ICD-10-CM

## 2017-11-16 DIAGNOSIS — Z34.83 ENCOUNTER FOR SUPERVISION OF OTHER NORMAL PREGNANCY IN THIRD TRIMESTER: ICD-10-CM

## 2017-11-16 PROCEDURE — 0502F SUBSEQUENT PRENATAL CARE: CPT | Performed by: OBSTETRICS & GYNECOLOGY

## 2017-11-16 NOTE — PROGRESS NOTES
Ob follow up    Wanda Solis is a 27 y.o.  35w1d patient being seen today for her obstetrical visit. Patient reports no complaints. Fetal movement: normal.      ROS - Denies leaking fluid, vaginal bleeding and notes good fetal movement.     /66  Wt 297 lb (135 kg)  LMP 2017  BMI 50.98 kg/m2    FHT:  145 BPM    Uterine Size: size equals dates   Presentations: cephalic   Pelvic Exam:     Dilation: 1cm    Effacement: 25%    Station:  -2                 Assessment    1) Pregnancy at 35w1d  2) Fetal status reassuring   3) GBS status - Done today   4) Obesity, weight stable.   Check BPP for BMI > 40   Consider IND for  -     Plan    Labor warnings   [facility]        Amor Michele MD   2017  2:37 PM

## 2017-11-18 LAB — GP B STREP DNA SPEC QL NAA+PROBE: POSITIVE

## 2017-11-20 PROBLEM — B95.1 POSITIVE GBS TEST: Status: ACTIVE | Noted: 2017-11-20

## 2017-11-21 ENCOUNTER — PROCEDURE VISIT (OUTPATIENT)
Dept: OBSTETRICS AND GYNECOLOGY | Facility: CLINIC | Age: 27
End: 2017-11-21

## 2017-11-21 ENCOUNTER — ROUTINE PRENATAL (OUTPATIENT)
Dept: OBSTETRICS AND GYNECOLOGY | Facility: CLINIC | Age: 27
End: 2017-11-21

## 2017-11-21 VITALS — BODY MASS INDEX: 50.91 KG/M2 | SYSTOLIC BLOOD PRESSURE: 114 MMHG | DIASTOLIC BLOOD PRESSURE: 72 MMHG | WEIGHT: 293 LBS

## 2017-11-21 DIAGNOSIS — O99.213 OBESITY COMPLICATING PREGNANCY IN THIRD TRIMESTER: Primary | ICD-10-CM

## 2017-11-21 DIAGNOSIS — O99.213 OBESITY AFFECTING PREGNANCY IN THIRD TRIMESTER: Primary | ICD-10-CM

## 2017-11-21 DIAGNOSIS — Z3A.35 35 WEEKS GESTATION OF PREGNANCY: ICD-10-CM

## 2017-11-21 PROCEDURE — 0502F SUBSEQUENT PRENATAL CARE: CPT | Performed by: OBSTETRICS & GYNECOLOGY

## 2017-11-21 PROCEDURE — 76819 FETAL BIOPHYS PROFIL W/O NST: CPT | Performed by: OBSTETRICS & GYNECOLOGY

## 2017-11-21 NOTE — PROGRESS NOTES
CC:  Pregnancy  Pt feels well, no issues or problems.  Reports good fetal movement.  BPP today was 8/8.  Labor precautions and fetal kick counts discussed with her.  Discussed positive GBS test and need for antibiotics in labor.  A/P:  Supervision of pregnancy complicated by obesity at 35 weeks  --F/U next week with Dr. Michele

## 2017-11-28 ENCOUNTER — PROCEDURE VISIT (OUTPATIENT)
Dept: OBSTETRICS AND GYNECOLOGY | Facility: CLINIC | Age: 27
End: 2017-11-28

## 2017-11-28 ENCOUNTER — ROUTINE PRENATAL (OUTPATIENT)
Dept: OBSTETRICS AND GYNECOLOGY | Facility: CLINIC | Age: 27
End: 2017-11-28

## 2017-11-28 VITALS — WEIGHT: 293 LBS | SYSTOLIC BLOOD PRESSURE: 109 MMHG | BODY MASS INDEX: 50.81 KG/M2 | DIASTOLIC BLOOD PRESSURE: 70 MMHG

## 2017-11-28 DIAGNOSIS — Z34.83 ENCOUNTER FOR SUPERVISION OF OTHER NORMAL PREGNANCY IN THIRD TRIMESTER: ICD-10-CM

## 2017-11-28 DIAGNOSIS — O99.213 OBESITY AFFECTING PREGNANCY IN THIRD TRIMESTER: Primary | ICD-10-CM

## 2017-11-28 DIAGNOSIS — B95.1 POSITIVE GBS TEST: ICD-10-CM

## 2017-11-28 PROCEDURE — 0502F SUBSEQUENT PRENATAL CARE: CPT | Performed by: OBSTETRICS & GYNECOLOGY

## 2017-11-28 PROCEDURE — 76819 FETAL BIOPHYS PROFIL W/O NST: CPT | Performed by: OBSTETRICS & GYNECOLOGY

## 2017-11-28 PROCEDURE — 76816 OB US FOLLOW-UP PER FETUS: CPT | Performed by: OBSTETRICS & GYNECOLOGY

## 2017-11-28 NOTE — PROGRESS NOTES
Ob follow up    Wanda Solis is a 27 y.o.  36w6d patient being seen today for her obstetrical visit. Patient reports cramping.. Fetal movement: normal.      ROS - Denies leaking fluid, vaginal bleeding and notes good fetal movement.     /70  Wt 296 lb (134 kg)  LMP 2017  BMI 50.81 kg/m2    FHT:  156BPM    Uterine Size: size equals dates   Presentations: cephalic   Pelvic Exam:     Dilation: 1cm    Effacement: 25%    Station:  -2                 Assessment    1) Pregnancy at 36w6d  2) Fetal status reassuring   3) GBS status - Positive  4) Obesity - Growth at 67%, A/C 88%, Normal PROSPER, cephalic and BPP    Continue to monitor BPP for BMI > 40       Plan    Labor warnings   [facility]        Amor Michele MD   2017  12:08 PM

## 2017-12-07 ENCOUNTER — PROCEDURE VISIT (OUTPATIENT)
Dept: OBSTETRICS AND GYNECOLOGY | Facility: CLINIC | Age: 27
End: 2017-12-07

## 2017-12-07 ENCOUNTER — ROUTINE PRENATAL (OUTPATIENT)
Dept: OBSTETRICS AND GYNECOLOGY | Facility: CLINIC | Age: 27
End: 2017-12-07

## 2017-12-07 VITALS — BODY MASS INDEX: 51.15 KG/M2 | SYSTOLIC BLOOD PRESSURE: 124 MMHG | WEIGHT: 293 LBS | DIASTOLIC BLOOD PRESSURE: 78 MMHG

## 2017-12-07 DIAGNOSIS — L73.2 HIDRADENITIS: ICD-10-CM

## 2017-12-07 DIAGNOSIS — B95.1 POSITIVE GBS TEST: ICD-10-CM

## 2017-12-07 DIAGNOSIS — O99.213 OBESITY AFFECTING PREGNANCY IN THIRD TRIMESTER, ANTEPARTUM: Primary | ICD-10-CM

## 2017-12-07 DIAGNOSIS — Z34.83 ENCOUNTER FOR SUPERVISION OF OTHER NORMAL PREGNANCY IN THIRD TRIMESTER: ICD-10-CM

## 2017-12-07 PROCEDURE — 76819 FETAL BIOPHYS PROFIL W/O NST: CPT | Performed by: OBSTETRICS & GYNECOLOGY

## 2017-12-07 PROCEDURE — 0502F SUBSEQUENT PRENATAL CARE: CPT | Performed by: OBSTETRICS & GYNECOLOGY

## 2017-12-07 NOTE — PROGRESS NOTES
Ob follow up    Wanda Solis is a 27 y.o.  38w1d patient being seen today for her obstetrical visit. Patient reports boil in the vaginal area. . Fetal movement: normal.      ROS - Denies leaking fluid, vaginal bleeding and notes good fetal movement.     /78  Wt 135 kg (298 lb)  LMP 2017  BMI 51.15 kg/m2    FHT:  146BPM    Uterine Size: size equals dates   Presentations: cephalic   Pelvic Exam:     Dilation: 2cm    Effacement: 50%    Station:  -2                 Assessment    1) Pregnancy at 38w1d  2) Fetal status reassuring   3) GBS status - Positive  4) hidradenitis - Treat with dicloxacillin  5) Obesity > 40 BMI   Induction next Thursday            Plan    Labor warnings   [facility]        Amor Michele MD   2017  3:17 PM

## 2017-12-14 ENCOUNTER — ANESTHESIA (OUTPATIENT)
Dept: LABOR AND DELIVERY | Facility: HOSPITAL | Age: 27
End: 2017-12-14

## 2017-12-14 ENCOUNTER — HOSPITAL ENCOUNTER (INPATIENT)
Facility: HOSPITAL | Age: 27
LOS: 3 days | Discharge: HOME | End: 2017-12-17
Attending: OBSTETRICS & GYNECOLOGY | Admitting: OBSTETRICS & GYNECOLOGY

## 2017-12-14 ENCOUNTER — ANESTHESIA EVENT (OUTPATIENT)
Dept: LABOR AND DELIVERY | Facility: HOSPITAL | Age: 27
End: 2017-12-14

## 2017-12-14 DIAGNOSIS — B95.1 POSITIVE GBS TEST: ICD-10-CM

## 2017-12-14 DIAGNOSIS — O99.213 OBESITY AFFECTING PREGNANCY IN THIRD TRIMESTER: Primary | ICD-10-CM

## 2017-12-14 LAB
ABO GROUP BLD: NORMAL
BASOPHILS # BLD AUTO: 0.01 10*3/MM3 (ref 0–0.2)
BASOPHILS NFR BLD AUTO: 0.1 % (ref 0–1.5)
BLD GP AB SCN SERPL QL: NEGATIVE
DEPRECATED RDW RBC AUTO: 48 FL (ref 37–54)
EOSINOPHIL # BLD AUTO: 0.12 10*3/MM3 (ref 0–0.7)
EOSINOPHIL NFR BLD AUTO: 1.7 % (ref 0.3–6.2)
ERYTHROCYTE [DISTWIDTH] IN BLOOD BY AUTOMATED COUNT: 16.1 % (ref 11.7–13)
HCT VFR BLD AUTO: 30 % (ref 35.6–45.5)
HGB BLD-MCNC: 9.9 G/DL (ref 11.9–15.5)
IMM GRANULOCYTES # BLD: 0.03 10*3/MM3 (ref 0–0.03)
IMM GRANULOCYTES NFR BLD: 0.4 % (ref 0–0.5)
LYMPHOCYTES # BLD AUTO: 1.71 10*3/MM3 (ref 0.9–4.8)
LYMPHOCYTES NFR BLD AUTO: 23.8 % (ref 19.6–45.3)
MCH RBC QN AUTO: 26.8 PG (ref 26.9–32)
MCHC RBC AUTO-ENTMCNC: 33 G/DL (ref 32.4–36.3)
MCV RBC AUTO: 81.3 FL (ref 80.5–98.2)
MONOCYTES # BLD AUTO: 0.43 10*3/MM3 (ref 0.2–1.2)
MONOCYTES NFR BLD AUTO: 6 % (ref 5–12)
NEUTROPHILS # BLD AUTO: 4.87 10*3/MM3 (ref 1.9–8.1)
NEUTROPHILS NFR BLD AUTO: 68 % (ref 42.7–76)
PLATELET # BLD AUTO: 221 10*3/MM3 (ref 140–500)
PMV BLD AUTO: 9.4 FL (ref 6–12)
RBC # BLD AUTO: 3.69 10*6/MM3 (ref 3.9–5.2)
RH BLD: POSITIVE
WBC NRBC COR # BLD: 7.17 10*3/MM3 (ref 4.5–10.7)

## 2017-12-14 PROCEDURE — 86901 BLOOD TYPING SEROLOGIC RH(D): CPT | Performed by: OBSTETRICS & GYNECOLOGY

## 2017-12-14 PROCEDURE — 25010000002 PENICILLIN G POTASSIUM PER 600000 UNITS: Performed by: OBSTETRICS & GYNECOLOGY

## 2017-12-14 PROCEDURE — 85027 COMPLETE CBC AUTOMATED: CPT | Performed by: OBSTETRICS & GYNECOLOGY

## 2017-12-14 PROCEDURE — C1755 CATHETER, INTRASPINAL: HCPCS | Performed by: ANESTHESIOLOGY

## 2017-12-14 PROCEDURE — 10907ZC DRAINAGE OF AMNIOTIC FLUID, THERAPEUTIC FROM PRODUCTS OF CONCEPTION, VIA NATURAL OR ARTIFICIAL OPENING: ICD-10-PCS | Performed by: OBSTETRICS & GYNECOLOGY

## 2017-12-14 PROCEDURE — 3E033VJ INTRODUCTION OF OTHER HORMONE INTO PERIPHERAL VEIN, PERCUTANEOUS APPROACH: ICD-10-PCS | Performed by: OBSTETRICS & GYNECOLOGY

## 2017-12-14 PROCEDURE — 86850 RBC ANTIBODY SCREEN: CPT | Performed by: OBSTETRICS & GYNECOLOGY

## 2017-12-14 PROCEDURE — 86900 BLOOD TYPING SEROLOGIC ABO: CPT | Performed by: OBSTETRICS & GYNECOLOGY

## 2017-12-14 RX ORDER — PROMETHAZINE HYDROCHLORIDE 25 MG/ML
12.5 INJECTION, SOLUTION INTRAMUSCULAR; INTRAVENOUS EVERY 6 HOURS PRN
Status: DISCONTINUED | OUTPATIENT
Start: 2017-12-14 | End: 2017-12-15 | Stop reason: HOSPADM

## 2017-12-14 RX ORDER — SODIUM CHLORIDE 0.9 % (FLUSH) 0.9 %
1-10 SYRINGE (ML) INJECTION AS NEEDED
Status: DISCONTINUED | OUTPATIENT
Start: 2017-12-14 | End: 2017-12-15 | Stop reason: HOSPADM

## 2017-12-14 RX ORDER — DIPHENHYDRAMINE HYDROCHLORIDE 50 MG/ML
12.5 INJECTION INTRAMUSCULAR; INTRAVENOUS EVERY 8 HOURS PRN
Status: DISCONTINUED | OUTPATIENT
Start: 2017-12-14 | End: 2017-12-15 | Stop reason: HOSPADM

## 2017-12-14 RX ORDER — SODIUM CHLORIDE, SODIUM LACTATE, POTASSIUM CHLORIDE, CALCIUM CHLORIDE 600; 310; 30; 20 MG/100ML; MG/100ML; MG/100ML; MG/100ML
125 INJECTION, SOLUTION INTRAVENOUS CONTINUOUS
Status: DISCONTINUED | OUTPATIENT
Start: 2017-12-14 | End: 2017-12-15

## 2017-12-14 RX ORDER — BUTORPHANOL TARTRATE 1 MG/ML
1 INJECTION, SOLUTION INTRAMUSCULAR; INTRAVENOUS
Status: DISCONTINUED | OUTPATIENT
Start: 2017-12-14 | End: 2017-12-15 | Stop reason: HOSPADM

## 2017-12-14 RX ORDER — OXYTOCIN/RINGER'S LACTATE 30/500 ML
125 PLASTIC BAG, INJECTION (ML) INTRAVENOUS CONTINUOUS PRN
Status: COMPLETED | OUTPATIENT
Start: 2017-12-14 | End: 2017-12-15

## 2017-12-14 RX ORDER — ONDANSETRON 2 MG/ML
4 INJECTION INTRAMUSCULAR; INTRAVENOUS ONCE AS NEEDED
Status: DISCONTINUED | OUTPATIENT
Start: 2017-12-14 | End: 2017-12-15 | Stop reason: HOSPADM

## 2017-12-14 RX ORDER — FAMOTIDINE 10 MG/ML
20 INJECTION, SOLUTION INTRAVENOUS ONCE AS NEEDED
Status: DISCONTINUED | OUTPATIENT
Start: 2017-12-14 | End: 2017-12-15 | Stop reason: HOSPADM

## 2017-12-14 RX ORDER — PROMETHAZINE HYDROCHLORIDE 25 MG/1
12.5 TABLET ORAL EVERY 6 HOURS PRN
Status: DISCONTINUED | OUTPATIENT
Start: 2017-12-14 | End: 2017-12-15 | Stop reason: HOSPADM

## 2017-12-14 RX ORDER — PENICILLIN G 3000000 [IU]/50ML
3 INJECTION, SOLUTION INTRAVENOUS EVERY 4 HOURS
Status: DISCONTINUED | OUTPATIENT
Start: 2017-12-14 | End: 2017-12-15

## 2017-12-14 RX ORDER — OXYTOCIN/RINGER'S LACTATE 30/500 ML
999 PLASTIC BAG, INJECTION (ML) INTRAVENOUS ONCE
Status: COMPLETED | OUTPATIENT
Start: 2017-12-14 | End: 2017-12-15

## 2017-12-14 RX ORDER — EPHEDRINE SULFATE 50 MG/ML
5 INJECTION, SOLUTION INTRAVENOUS AS NEEDED
Status: DISCONTINUED | OUTPATIENT
Start: 2017-12-14 | End: 2017-12-15 | Stop reason: HOSPADM

## 2017-12-14 RX ORDER — PROMETHAZINE HYDROCHLORIDE 12.5 MG/1
12.5 SUPPOSITORY RECTAL EVERY 6 HOURS PRN
Status: DISCONTINUED | OUTPATIENT
Start: 2017-12-14 | End: 2017-12-15 | Stop reason: HOSPADM

## 2017-12-14 RX ORDER — LIDOCAINE HYDROCHLORIDE AND EPINEPHRINE 15; 5 MG/ML; UG/ML
INJECTION, SOLUTION EPIDURAL AS NEEDED
Status: DISCONTINUED | OUTPATIENT
Start: 2017-12-14 | End: 2017-12-15 | Stop reason: SURG

## 2017-12-14 RX ORDER — CARBOPROST TROMETHAMINE 250 UG/ML
250 INJECTION, SOLUTION INTRAMUSCULAR AS NEEDED
Status: DISCONTINUED | OUTPATIENT
Start: 2017-12-14 | End: 2017-12-15 | Stop reason: HOSPADM

## 2017-12-14 RX ORDER — SODIUM CHLORIDE, SODIUM LACTATE, POTASSIUM CHLORIDE, CALCIUM CHLORIDE 600; 310; 30; 20 MG/100ML; MG/100ML; MG/100ML; MG/100ML
999 INJECTION, SOLUTION INTRAVENOUS CONTINUOUS
Status: DISCONTINUED | OUTPATIENT
Start: 2017-12-14 | End: 2017-12-15

## 2017-12-14 RX ORDER — METHYLERGONOVINE MALEATE 0.2 MG/ML
200 INJECTION INTRAVENOUS ONCE AS NEEDED
Status: DISCONTINUED | OUTPATIENT
Start: 2017-12-14 | End: 2017-12-15 | Stop reason: HOSPADM

## 2017-12-14 RX ORDER — LIDOCAINE HYDROCHLORIDE 10 MG/ML
5 INJECTION, SOLUTION INFILTRATION; PERINEURAL AS NEEDED
Status: DISCONTINUED | OUTPATIENT
Start: 2017-12-14 | End: 2017-12-15 | Stop reason: HOSPADM

## 2017-12-14 RX ORDER — MISOPROSTOL 200 UG/1
800 TABLET ORAL AS NEEDED
Status: DISCONTINUED | OUTPATIENT
Start: 2017-12-14 | End: 2017-12-15 | Stop reason: HOSPADM

## 2017-12-14 RX ORDER — OXYTOCIN/RINGER'S LACTATE 30/500 ML
2-30 PLASTIC BAG, INJECTION (ML) INTRAVENOUS
Status: DISCONTINUED | OUTPATIENT
Start: 2017-12-14 | End: 2017-12-15 | Stop reason: HOSPADM

## 2017-12-14 RX ADMIN — Medication 96 ML/HR: at 17:58

## 2017-12-14 RX ADMIN — PENICILLIN G 3 MILLION UNITS: 3000000 INJECTION, SOLUTION INTRAVENOUS at 20:34

## 2017-12-14 RX ADMIN — SODIUM CHLORIDE, POTASSIUM CHLORIDE, SODIUM LACTATE AND CALCIUM CHLORIDE 125 ML/HR: 600; 310; 30; 20 INJECTION, SOLUTION INTRAVENOUS at 14:49

## 2017-12-14 RX ADMIN — SODIUM CHLORIDE, POTASSIUM CHLORIDE, SODIUM LACTATE AND CALCIUM CHLORIDE 999 ML/HR: 600; 310; 30; 20 INJECTION, SOLUTION INTRAVENOUS at 11:47

## 2017-12-14 RX ADMIN — PENICILLIN G 3 MILLION UNITS: 3000000 INJECTION, SOLUTION INTRAVENOUS at 17:00

## 2017-12-14 RX ADMIN — SODIUM CHLORIDE, POTASSIUM CHLORIDE, SODIUM LACTATE AND CALCIUM CHLORIDE 125 ML/HR: 600; 310; 30; 20 INJECTION, SOLUTION INTRAVENOUS at 17:51

## 2017-12-14 RX ADMIN — OXYTOCIN 2 MILLI-UNITS/MIN: 10 INJECTION, SOLUTION INTRAMUSCULAR; INTRAVENOUS at 12:20

## 2017-12-14 RX ADMIN — PENICILLIN G POTASSIUM 5 MILLION UNITS: 5000000 POWDER, FOR SOLUTION INTRAMUSCULAR; INTRAPLEURAL; INTRATHECAL; INTRAVENOUS at 12:30

## 2017-12-14 RX ADMIN — EPHEDRINE SULFATE 5 MG: 50 INJECTION, SOLUTION INTRAVENOUS at 18:14

## 2017-12-14 RX ADMIN — LIDOCAINE HYDROCHLORIDE AND EPINEPHRINE 4 ML: 15; 5 INJECTION, SOLUTION EPIDURAL at 17:57

## 2017-12-14 NOTE — H&P
[facility]  Obstetric History and Physical    Induction at term with obesity (BMI > 40)         Patient is a 27 y.o. female  currently at 39w1d, who presents with induction planned.    Her prenatal care was with Dr. Michele and was complicated by obesity.             Obstetric History       T1      L1     SAB0   TAB0   Ectopic0   Multiple0   Live Births1       # Outcome Date GA Lbr [place]/2nd Weight Sex Delivery Anes PTL Lv   5 Current            4 Term 09 40w0d  3402 g (7 lb 8 oz) F Vag-Spont EPI N DUANE   3 AB            2 SAB            1 Para                       Past Medical History:   Diagnosis Date   • Abnormal Pap smear of cervix    • Urogenital trichomoniasis           Past Surgical History:   Procedure Laterality Date   • DILATATION AND CURETTAGE     • WISDOM TOOTH EXTRACTION            No current facility-administered medications on file prior to encounter.      Current Outpatient Prescriptions on File Prior to Encounter   Medication Sig Dispense Refill   • dicloxacillin (DYNAPEN) 500 MG capsule Take 1 capsule by mouth 4 (Four) Times a Day for 7 days. 28 capsule 0        No Known Allergies       Social History     Social History   • Marital status: Unknown     Spouse name: N/A   • Number of children: N/A   • Years of education: N/A     Occupational History   • Not on file.     Social History Main Topics   • Smoking status: Never Smoker   • Smokeless tobacco: Never Used   • Alcohol use No   • Drug use: No   • Sexual activity: Yes     Partners: Male     Birth control/ protection: None     Other Topics Concern   • Not on file     Social History Narrative          Family History   Problem Relation Age of Onset   • Breast cancer Neg Hx    • Ovarian cancer Neg Hx    • Uterine cancer Neg Hx    • Colon cancer Neg Hx    • Deep vein thrombosis Neg Hx    • Pulmonary embolism Neg Hx         Review of Systems   Constitutional: Negative for chills and fever.   Gastrointestinal: Positive for  abdominal pain.   Genitourinary: Positive for pelvic pain. Negative for vaginal bleeding and vaginal discharge.   All other systems reviewed and are negative.      LMP 02/21/2017    Physical Exam   Constitutional: She is oriented to person, place, and time. She appears well-developed and well-nourished.   HENT:   Head: Normocephalic and atraumatic.   Eyes: Conjunctivae are normal.   Neck: Normal range of motion. Neck supple. No thyromegaly present.   Cardiovascular: Normal rate, regular rhythm and normal heart sounds.    No murmur heard.  Pulmonary/Chest: Effort normal and breath sounds normal.   Abdominal: Soft. Bowel sounds are normal.   Genitourinary: Uterus is enlarged (EFW 7.5#).   Musculoskeletal: She exhibits no edema.   Neurological: She is alert and oriented to person, place, and time.   Skin: No rash noted.   Psychiatric: She has a normal mood and affect. Her behavior is normal.       Presentation: Cephalic        Lab Results   Component Value Date    WBC 7.4 04/25/2017    HGB 13.1 04/25/2017    HCT 38.0 04/25/2017    MCV 82 04/25/2017     04/25/2017       Principal Problem:    Obesity affecting pregnancy in third trimester  Active Problems:    Obesity in pregnancy, antepartum, third trimester      Assessment & Plan    Assessment:  1. Pregnancy at 39w1d  2.  induction of labor  for obesity  with favorable cervix  3.  Obstetrical history significant for obesity .  4.  GBS status: .positive    Plan:  1. fetal and uterine monitoring  continuously, labor augmentation  Pitocin, analgesia with  epidural and antibiotic for GBS  2. Plan of care has been reviewed with patient and family  3.  Risks, benefits of treatment plan have been discussed.  4.  All questions have been answered.        Amor Michele MD  12/14/2017  10:48 AM

## 2017-12-14 NOTE — ANESTHESIA PREPROCEDURE EVALUATION
Anesthesia Evaluation     Patient summary reviewed   no history of anesthetic complications:         Airway   Dental      Pulmonary - negative pulmonary ROS and normal exam   Cardiovascular - negative cardio ROS and normal exam        Neuro/Psych  GI/Hepatic/Renal/Endo    (+) morbid obesity,     Musculoskeletal     Abdominal    Substance History      OB/GYN    (+) Pregnant,         Other        (-) blood dyscrasia                                  Anesthesia Plan    ASA 3     epidural     Anesthetic plan and risks discussed with patient.

## 2017-12-14 NOTE — PROGRESS NOTES
AROM - Clear  IUPC placed   Cx 3/70/-2  FHT reassuring.     Amor Michele MD  12/14/2017  4:52 PM

## 2017-12-14 NOTE — PLAN OF CARE
Problem: Patient Care Overview (Adult)  Goal: Adult Individualization and Mutuality    12/14/17 1425   Individualization   Patient Specific Preferences Epidural, Bottle feeding,    Patient Specific Goals Good pain control, healthy baby and mom   Patient Specific Interventions Good pain relief with Epidural    Mutuality/Individual Preferences   What Anxieties, Fears or Concerns Do You Have About Your Health or Care? SVE being uncomfortable   What Questions Do You Have About Your Health or Care? When can I get epidural   What Information Would Help Us Give You More Personalized Care? FOB/Mom does not want to cut cord

## 2017-12-14 NOTE — ANESTHESIA PROCEDURE NOTES
Labor Epidural    Patient location during procedure: OB  Performed By  Anesthesiologist: SUSAN BARNARD  Preanesthetic Checklist  Completed: patient identified, site marked, surgical consent, pre-op evaluation, timeout performed, IV checked, risks and benefits discussed and monitors and equipment checked  Additional Notes  Unable to palpate landmarks  Prep:  Pt Position:sitting  Sterile Tech:gloves, sterile barrier and cap  Prep:chlorhexidine gluconate and isopropyl alcohol  Monitoring:blood pressure monitoring, continuous pulse oximetry and EKG  Epidural Block Procedure:  Approach:midline  Guidance:palpation technique  Location:L2-L3  Needle Type:Tuohy  Needle Gauge:17  Loss of Resistance Medium: saline  Loss of Resistance: 8cm  Cath Depth at skin:15 cm  Paresthesia: transient and right  Aspiration:negative  Test Dose:negative  Number of Attempts: 1  Post Assessment:  Dressing:occlusive dressing applied and secured with tape  Pt Tolerance:patient tolerated the procedure well with no apparent complications  Complications:no

## 2017-12-15 PROCEDURE — 25010000002 PENICILLIN G POTASSIUM PER 600000 UNITS: Performed by: OBSTETRICS & GYNECOLOGY

## 2017-12-15 PROCEDURE — 0UQMXZZ REPAIR VULVA, EXTERNAL APPROACH: ICD-10-PCS | Performed by: OBSTETRICS & GYNECOLOGY

## 2017-12-15 PROCEDURE — 0KQM0ZZ REPAIR PERINEUM MUSCLE, OPEN APPROACH: ICD-10-PCS | Performed by: OBSTETRICS & GYNECOLOGY

## 2017-12-15 PROCEDURE — 59400 OBSTETRICAL CARE: CPT | Performed by: OBSTETRICS & GYNECOLOGY

## 2017-12-15 RX ORDER — PROMETHAZINE HYDROCHLORIDE 12.5 MG/1
12.5 TABLET ORAL EVERY 4 HOURS PRN
Status: DISCONTINUED | OUTPATIENT
Start: 2017-12-15 | End: 2017-12-17 | Stop reason: HOSPADM

## 2017-12-15 RX ORDER — DICLOXACILLIN SODIUM 250 MG/1
500 CAPSULE ORAL EVERY 6 HOURS SCHEDULED
Status: DISCONTINUED | OUTPATIENT
Start: 2017-12-15 | End: 2017-12-17 | Stop reason: HOSPADM

## 2017-12-15 RX ORDER — PRENATAL VIT NO.126/IRON/FOLIC 28MG-0.8MG
1 TABLET ORAL DAILY
Status: DISCONTINUED | OUTPATIENT
Start: 2017-12-15 | End: 2017-12-17 | Stop reason: HOSPADM

## 2017-12-15 RX ORDER — LANOLIN 100 %
OINTMENT (GRAM) TOPICAL
Status: DISCONTINUED | OUTPATIENT
Start: 2017-12-15 | End: 2017-12-17 | Stop reason: HOSPADM

## 2017-12-15 RX ORDER — IBUPROFEN 200 MG
TABLET ORAL EVERY 12 HOURS SCHEDULED
Status: DISCONTINUED | OUTPATIENT
Start: 2017-12-15 | End: 2017-12-17 | Stop reason: HOSPADM

## 2017-12-15 RX ORDER — ZOLPIDEM TARTRATE 5 MG/1
5 TABLET ORAL NIGHTLY PRN
Status: DISCONTINUED | OUTPATIENT
Start: 2017-12-15 | End: 2017-12-17 | Stop reason: HOSPADM

## 2017-12-15 RX ORDER — SODIUM CHLORIDE 0.9 % (FLUSH) 0.9 %
1-10 SYRINGE (ML) INJECTION AS NEEDED
Status: DISCONTINUED | OUTPATIENT
Start: 2017-12-15 | End: 2017-12-17 | Stop reason: HOSPADM

## 2017-12-15 RX ORDER — HYDROCODONE BITARTRATE AND ACETAMINOPHEN 5; 325 MG/1; MG/1
2 TABLET ORAL EVERY 6 HOURS PRN
Status: DISCONTINUED | OUTPATIENT
Start: 2017-12-15 | End: 2017-12-17 | Stop reason: HOSPADM

## 2017-12-15 RX ORDER — DOCUSATE SODIUM 100 MG/1
100 CAPSULE, LIQUID FILLED ORAL 2 TIMES DAILY
Status: DISCONTINUED | OUTPATIENT
Start: 2017-12-15 | End: 2017-12-17 | Stop reason: HOSPADM

## 2017-12-15 RX ORDER — MISOPROSTOL 200 UG/1
800 TABLET ORAL AS NEEDED
Status: DISCONTINUED | OUTPATIENT
Start: 2017-12-15 | End: 2017-12-17 | Stop reason: HOSPADM

## 2017-12-15 RX ORDER — IBUPROFEN 800 MG/1
800 TABLET ORAL EVERY 8 HOURS PRN
Status: DISCONTINUED | OUTPATIENT
Start: 2017-12-15 | End: 2017-12-17 | Stop reason: HOSPADM

## 2017-12-15 RX ORDER — BISACODYL 10 MG
10 SUPPOSITORY, RECTAL RECTAL DAILY PRN
Status: DISCONTINUED | OUTPATIENT
Start: 2017-12-16 | End: 2017-12-17 | Stop reason: HOSPADM

## 2017-12-15 RX ADMIN — Medication 1 TABLET: at 09:37

## 2017-12-15 RX ADMIN — HYDROCODONE BITARTRATE AND ACETAMINOPHEN 1 TABLET: 5; 325 TABLET ORAL at 16:25

## 2017-12-15 RX ADMIN — DOCUSATE SODIUM 100 MG: 100 CAPSULE, LIQUID FILLED ORAL at 09:37

## 2017-12-15 RX ADMIN — DOCUSATE SODIUM 100 MG: 100 CAPSULE, LIQUID FILLED ORAL at 19:00

## 2017-12-15 RX ADMIN — SODIUM CHLORIDE, POTASSIUM CHLORIDE, SODIUM LACTATE AND CALCIUM CHLORIDE 125 ML/HR: 600; 310; 30; 20 INJECTION, SOLUTION INTRAVENOUS at 00:13

## 2017-12-15 RX ADMIN — IBUPROFEN 800 MG: 800 TABLET ORAL at 12:21

## 2017-12-15 RX ADMIN — OXYTOCIN 999 ML/HR: 10 INJECTION INTRAVENOUS at 01:32

## 2017-12-15 RX ADMIN — DICLOXACILLIN SODIUM 500 MG: 250 CAPSULE ORAL at 19:01

## 2017-12-15 RX ADMIN — IBUPROFEN 800 MG: 800 TABLET ORAL at 04:25

## 2017-12-15 RX ADMIN — OXYTOCIN 125 ML/HR: 10 INJECTION INTRAVENOUS at 02:13

## 2017-12-15 RX ADMIN — BACITRACIN ZINC NEOMYCIN SULFATE POLYMYXIN B SULFATE: 400; 3.5; 5 OINTMENT TOPICAL at 21:40

## 2017-12-15 RX ADMIN — PRAMOXINE HYDROCHLORIDE AND HYDROCORTISONE ACETATE: 100; 100 AEROSOL, FOAM TOPICAL at 09:37

## 2017-12-15 RX ADMIN — DICLOXACILLIN SODIUM 500 MG: 250 CAPSULE ORAL at 06:55

## 2017-12-15 RX ADMIN — PENICILLIN G 3 MILLION UNITS: 3000000 INJECTION, SOLUTION INTRAVENOUS at 00:18

## 2017-12-15 RX ADMIN — BENZOCAINE AND MENTHOL: 20; .5 SPRAY TOPICAL at 09:37

## 2017-12-15 RX ADMIN — DICLOXACILLIN SODIUM 500 MG: 250 CAPSULE ORAL at 12:21

## 2017-12-15 NOTE — L&D DELIVERY NOTE
[facility]  Vaginal Delivery Note    Delivery    Predelivery Diagnoses: 1) Pregnancy at 39w2d                                          2) obesity in pregnancy                     Postdelivery Diagnoses 1) Pregnancy at 39w2d                                            2) obesity in pregnancy     Attending :  Amor Michele MD     Procedure : Obstetrical controlled vaginal delivery    Delivery Narrative :     Wanda Solis is a 27 y.o. year old  @ 39w2d estimated gestational age. She presented to L&D for induction - obesity at 39 weeks.   Her prenatal care was with Dr. Michele and was complicated by obesity.   Once on L&D her labor progressed well along the labor curve with the aid pitocin, amniotomy and epidural interventions.  Once she was to the second stage, I was called for delivery.     Upon arrive she was prepped in the lithotomy position, and was doing well in her pushing efforts.  With delivery of the fetal head in OA presentation, bulb suction was performed and using gentle downward traction the anterior shoulder delivered without difficulty. No nuchal cord noted.  The infant showed good cry and tone and was placed upon the Mother's abdomen.   After a thirty second delay, I then clamped the cord and it was cut by me.    Care of the infant was then turned over to the delivery team.   Cord blood was obtained and then using gentle pressure the placenta was delivered spontaneous/intact and noted to have 3 vessel cord.  At that point I undertook inspection of the perineum and vulva and I repaired bilateral labial lacerations and a second degree perineal laceration using 3-0 Monocryl in standard fashion with good hemostatic and cosmetic results.  This repair was difficult due to brisk vulvar bleeding from several sites, but eventually able to control through both repair and pressure.      After repair of all lacerations, the area was noted to be hemostatic and all sponge and needle counts were  correct. A vaginal exam and rectal exam showed no issues with retained equipment or suture in an abnormal place.      There was one family members noted to be in the room with this patient.            Delivery: Vaginal, Spontaneous Delivery     YOB: 2019    Time of Birth: 1:28 AM      Anesthesia: Epidural             EBL: 450 cc (from vulvar lacerations)           Infant    Findings: male  infant     Infant observations: Weight: No birth weight on file.   Length:    in  Observations/Comments:         Apgars: 7   @ 1 minute /    8   @ 5 minutes       Complications  none    Disposition  Mother to Mother Baby/Postpartum  in stable condition currently.  Baby to NBN  in stable condition currently.  Was grunting in room shortly after delivery and taken to NBN for observation with father of the baby.       Amor Michele MD  12/15/17  2:04 AM

## 2017-12-15 NOTE — NURSING NOTE
"Pt seen for 3  small open sores under left breast; one small sore under right breast and evidence of two healed areas.  Pt states she had these prior to coming to the hospital  Open areas are superficial with pink wound bed; scant amt of serous drainge but pt reports she had \"pus\" from the sores in the recent past.  Pt has been placed on oral antibiotic;  Recommend cleansing areas with warm soap and water, rinsing and drying well then applying small amt of topical antibiotic ointment, covering with dry gauze drsgs.  Not pressure related but may have started with moisture under breasts.     "

## 2017-12-15 NOTE — PLAN OF CARE
Problem: Patient Care Overview (Adult)  Goal: Plan of Care Review  Outcome: Ongoing (interventions implemented as appropriate)    12/15/17 0244   Coping/Psychosocial Response Interventions   Plan Of Care Reviewed With patient;spouse   Patient Care Overview   Progress progress toward functional goals as expected       Goal: Adult Individualization and Mutuality  Outcome: Ongoing (interventions implemented as appropriate)    17 1425 12/15/17 0244   Individualization   Patient Specific Preferences Epidural, Bottle feeding,  --    Patient Specific Goals Good pain control, healthy baby and mom --    Patient Specific Interventions Good pain relief with Epidural  --    Mutuality/Individual Preferences   What Anxieties, Fears or Concerns Do You Have About Your Health or Care? --  serperation from    What Questions Do You Have About Your Health or Care? When can I get epidural --    What Information Would Help Us Give You More Personalized Care? FOB/Mom does not want to cut cord --        Goal: Discharge Needs Assessment  Outcome: Ongoing (interventions implemented as appropriate)    17 1243 17 1250 12/15/17 0244   Discharge Needs Assessment   Concerns To Be Addressed --  --  no discharge needs identified;denies needs/concerns at this time   Discharge Disposition --  --  home or self-care   Living Environment   Transportation Available car --  --    Self-Care   Equipment Currently Used at Home --  none --          Problem: Labor (Cervical Ripen, Induct, Augment) (Adult,Obstetrics,Pediatric)  Goal: Signs and Symptoms of Listed Potential Problems Will be Absent or Manageable (Labor)  Outcome: Ongoing (interventions implemented as appropriate)    12/15/17 0244   Labor (Cervical Ripen, Induct, Augment)   Problems Assessed (Labor) all   Problems Present (Labor) none         Problem: Anesthesia/Analgesia, Neuraxial (Obstetrics)  Goal: Signs and Symptoms of Listed Potential Problems Will be Absent or  Manageable (Anesthesia/Analgesia, Neuraxial)  Outcome: Ongoing (interventions implemented as appropriate)    12/15/17 0244   Anesthesia/Analgesia, Neuraxial   Problems Assessed (Neuraxial Anesthesia/Analgesia, OB) all   Problems Present (Neuraxial Anesthesia/Analgesia, OB) category II (indeterminate)/category III (abnormal) fetal heart rate tracing;nausea/vomiting/pruritus         Problem: Fall Risk  (Adult,Obstetrics,Pediatric)  Goal: Identify Related Risk Factors and Signs and Symptoms  Outcome: Ongoing (interventions implemented as appropriate)    12/15/17 0244   Fall Risk    Fall Risk: Related Risk Factors balance change;medical devices;obesity;regional anesthesia    Fall Risk: Signs and Symptoms presence of fall risk factors       Goal: Absence of Maternal Fall  Outcome: Ongoing (interventions implemented as appropriate)    12/15/17 0244   Fall Risk  (Adult,Obstetrics,Pediatric)   Absence of Maternal Fall making progress toward outcome       Goal: Absence of [place] Fall/Drop  Outcome: Ongoing (interventions implemented as appropriate)    12/15/17 0244   Fall Risk  (Adult,Obstetrics,Pediatric)   Absence of  Fall/Drop making progress toward outcome

## 2017-12-15 NOTE — PLAN OF CARE
Problem: Patient Care Overview (Adult)  Goal: Plan of Care Review  Outcome: Ongoing (interventions implemented as appropriate)    12/15/17 0926   Coping/Psychosocial Response Interventions   Plan Of Care Reviewed With patient   Patient Care Overview   Progress progress toward functional goals as expected   Outcome Evaluation   Outcome Summary/Follow up Plan assessment wnl; breast absess covered with transparent film dressing; pain meds prn; IVF and FC continue this am       Goal: Adult Individualization and Mutuality  Outcome: Ongoing (interventions implemented as appropriate)  Goal: Discharge Needs Assessment  Outcome: Ongoing (interventions implemented as appropriate)    12/15/17 0926   Discharge Needs Assessment   Concerns To Be Addressed no discharge needs identified   Readmission Within The Last 30 Days no previous admission in last 30 days   Equipment Needed After Discharge none   Discharge Disposition home or self-care   Living Environment   Transportation Available car   Current Health   Anticipated Changes Related to Illness none   Self-Care   Equipment Currently Used at Home none         Problem: Postpartum, Vaginal Delivery (Adult)  Goal: Signs and Symptoms of Listed Potential Problems Will be Absent or Manageable (Postpartum, Vaginal Delivery)  Outcome: Ongoing (interventions implemented as appropriate)    12/15/17 0926   Postpartum, Vaginal Delivery   Problems Assessed (Postpartum Vaginal Delivery) all   Problems Present (Postpartum Vaginal Delivery) none

## 2017-12-15 NOTE — PROGRESS NOTES
Postpartum Progress Note      Status post Vaginal Delivery: Doing well postoperatively.     1) postpartum care immediately following delivery :    Routine care  2) Obesity  3) Skin lesions on breast - on dicloxacillin, dressing applied   These lesions appear more consistent with skin breakdown/ulcers than infection.  Requested wound consult to see if topical treatments may help    Rh status: O Positive  Rubella: immune  Gender: Male     Desires circumcision, however PPD 0 and in nursery at this time for respiratory concerns so will defer    Subjective     Postpartum Day 0: Vaginal delivery    The patient feels well. The patient denies emotional concerns. Pain is well controlled with current medications. The baby is in nursery for respiratory concerns. The patient is ambulating well. The patient is tolerating a normal diet.     RN informed me of aram on breast that were noted last night.  Patient reports they have been there for two weeks. Denies fevers or chills. Reports + drainage.  Has not tried any topical therapy for them. Had folliculitis in the groin that is now resolved.    Objective     Vital signs in last 24 hours:  Temp:  [98.4 °F (36.9 °C)-100.2 °F (37.9 °C)] 98.4 °F (36.9 °C)  Heart Rate:  [] 120  Resp:  [16-20] 16  BP: (0-160)/(0-92) 105/67      General:    alert, appears stated age and cooperative   Abdomen:  Soft, Non-tender    Lochia:  appropriate   Uterine Fundus:   firm   Ext    Edema not present   DVT Evaluation:  No evidence of DVT seen on physical exam.     Lab Results   Component Value Date    WBC 7.17 12/14/2017    HGB 9.9 (L) 12/14/2017    HCT 30.0 (L) 12/14/2017    MCV 81.3 12/14/2017     12/14/2017       Eli Lowry MD  12/15/2017  10:12 AM

## 2017-12-15 NOTE — PLAN OF CARE
Problem: Patient Care Overview (Adult)  Goal: Plan of Care Review  Outcome: Ongoing (interventions implemented as appropriate)    12/15/17 7900   Coping/Psychosocial Response Interventions   Plan Of Care Reviewed With patient;significant other   Patient Care Overview   Progress improving   Outcome Evaluation   Outcome Summary/Follow up Plan recovery ongoing, nella bst abscess redressed due to moisture, fluids ongoing for dark urine, pt seems iill c/o being hot .        Goal: Adult Individualization and Mutuality  Outcome: Ongoing (interventions implemented as appropriate)  Goal: Discharge Needs Assessment  Outcome: Ongoing (interventions implemented as appropriate)    Problem: Postpartum, Vaginal Delivery (Adult)  Goal: Signs and Symptoms of Listed Potential Problems Will be Absent or Manageable (Postpartum, Vaginal Delivery)  Outcome: Ongoing (interventions implemented as appropriate)

## 2017-12-16 LAB
BASOPHILS # BLD AUTO: 0.02 10*3/MM3 (ref 0–0.2)
BASOPHILS NFR BLD AUTO: 0.2 % (ref 0–1.5)
DEPRECATED RDW RBC AUTO: 48.9 FL (ref 37–54)
EOSINOPHIL # BLD AUTO: 0.19 10*3/MM3 (ref 0–0.7)
EOSINOPHIL NFR BLD AUTO: 1.7 % (ref 0.3–6.2)
ERYTHROCYTE [DISTWIDTH] IN BLOOD BY AUTOMATED COUNT: 16.6 % (ref 11.7–13)
HCT VFR BLD AUTO: 22.4 % (ref 35.6–45.5)
HGB BLD-MCNC: 7.5 G/DL (ref 11.9–15.5)
IMM GRANULOCYTES # BLD: 0.03 10*3/MM3 (ref 0–0.03)
IMM GRANULOCYTES NFR BLD: 0.3 % (ref 0–0.5)
LYMPHOCYTES # BLD AUTO: 2.89 10*3/MM3 (ref 0.9–4.8)
LYMPHOCYTES NFR BLD AUTO: 26.2 % (ref 19.6–45.3)
MCH RBC QN AUTO: 27 PG (ref 26.9–32)
MCHC RBC AUTO-ENTMCNC: 33.5 G/DL (ref 32.4–36.3)
MCV RBC AUTO: 80.6 FL (ref 80.5–98.2)
MONOCYTES # BLD AUTO: 0.79 10*3/MM3 (ref 0.2–1.2)
MONOCYTES NFR BLD AUTO: 7.1 % (ref 5–12)
NEUTROPHILS # BLD AUTO: 7.13 10*3/MM3 (ref 1.9–8.1)
NEUTROPHILS NFR BLD AUTO: 64.5 % (ref 42.7–76)
PLATELET # BLD AUTO: 221 10*3/MM3 (ref 140–500)
PMV BLD AUTO: 9.5 FL (ref 6–12)
RBC # BLD AUTO: 2.78 10*6/MM3 (ref 3.9–5.2)
WBC NRBC COR # BLD: 11.05 10*3/MM3 (ref 4.5–10.7)

## 2017-12-16 PROCEDURE — 99231 SBSQ HOSP IP/OBS SF/LOW 25: CPT | Performed by: OBSTETRICS & GYNECOLOGY

## 2017-12-16 PROCEDURE — 85025 COMPLETE CBC W/AUTO DIFF WBC: CPT | Performed by: OBSTETRICS & GYNECOLOGY

## 2017-12-16 RX ADMIN — IBUPROFEN 800 MG: 800 TABLET ORAL at 00:03

## 2017-12-16 RX ADMIN — BACITRACIN ZINC NEOMYCIN SULFATE POLYMYXIN B SULFATE: 400; 3.5; 5 OINTMENT TOPICAL at 11:41

## 2017-12-16 RX ADMIN — DICLOXACILLIN SODIUM 500 MG: 250 CAPSULE ORAL at 18:16

## 2017-12-16 RX ADMIN — IBUPROFEN 800 MG: 800 TABLET ORAL at 11:49

## 2017-12-16 RX ADMIN — BACITRACIN ZINC NEOMYCIN SULFATE POLYMYXIN B SULFATE 1 APPLICATION: 400; 3.5; 5 OINTMENT TOPICAL at 20:10

## 2017-12-16 RX ADMIN — HYDROCODONE BITARTRATE AND ACETAMINOPHEN 1 TABLET: 5; 325 TABLET ORAL at 11:49

## 2017-12-16 RX ADMIN — HYDROCODONE BITARTRATE AND ACETAMINOPHEN 1 TABLET: 5; 325 TABLET ORAL at 21:57

## 2017-12-16 RX ADMIN — DICLOXACILLIN SODIUM 500 MG: 250 CAPSULE ORAL at 00:05

## 2017-12-16 RX ADMIN — IBUPROFEN 800 MG: 800 TABLET ORAL at 21:57

## 2017-12-16 RX ADMIN — HYDROCODONE BITARTRATE AND ACETAMINOPHEN 1 TABLET: 5; 325 TABLET ORAL at 00:03

## 2017-12-16 RX ADMIN — DICLOXACILLIN SODIUM 500 MG: 250 CAPSULE ORAL at 10:58

## 2017-12-16 RX ADMIN — DOCUSATE SODIUM 100 MG: 100 CAPSULE, LIQUID FILLED ORAL at 10:58

## 2017-12-16 RX ADMIN — DOCUSATE SODIUM 100 MG: 100 CAPSULE, LIQUID FILLED ORAL at 18:16

## 2017-12-16 RX ADMIN — DICLOXACILLIN SODIUM 500 MG: 250 CAPSULE ORAL at 06:15

## 2017-12-16 NOTE — PLAN OF CARE
Problem: Patient Care Overview (Adult)  Goal: Plan of Care Review  Outcome: Ongoing (interventions implemented as appropriate)    12/16/17 0138   Coping/Psychosocial Response Interventions   Plan Of Care Reviewed With patient   Patient Care Overview   Progress progress toward functional goals as expected   Outcome Evaluation   Outcome Summary/Follow up Plan VVS, assessment wnl, bilateral breast absesses seen by wound nurse ordered neosporin & gauze/ no culture.       Goal: Adult Individualization and Mutuality  Outcome: Ongoing (interventions implemented as appropriate)  Goal: Discharge Needs Assessment  Outcome: Ongoing (interventions implemented as appropriate)    Problem: Postpartum, Vaginal Delivery (Adult)  Goal: Signs and Symptoms of Listed Potential Problems Will be Absent or Manageable (Postpartum, Vaginal Delivery)  Outcome: Ongoing (interventions implemented as appropriate)

## 2017-12-16 NOTE — PROGRESS NOTES
"[facility]  Vaginal Delivery Progress Note    Subjective   Subjective  Postpartum Day 1: Vaginal Delivery    The patient feels well.  Her pain is well controlled with nonsteroidal anti-inflammatory drugs and opioid analgesics.   She is ambulating well.  Patient describes her bleeding as thin lochia.    Breastfeeding: declines.    Objective     Objective   Vital Signs Range for the last 24 hours  Temperature: Temp:  [97.6 °F (36.4 °C)-98.2 °F (36.8 °C)] 97.7 °F (36.5 °C)   Temp Source: Temp src: Oral   BP: BP: ()/(57-71) 118/66   Pulse: Heart Rate:  [] 84   Respirations: Resp:  [20-22] 20   SPO2:     O2 Amount (l/min):     O2 Devices     Weight:       Admit Height:  Height: 162.6 cm (64\")    Physical Exam:  General:  no acute distresss.  Abdomen: abdomen is soft without significant tenderness, masses, organomegaly or guarding. Fundus: appropriate, firm, non tender  Extremities: normal, atraumatic, no cyanosis, and trace edema.       Lab results reviewed:  Yes   Rubella:  No results found for: RUBELLAIGGIN Nurse Transcribed from prenatal record --  No components found for: EXTRUBELQUAL  Rh Status:    RH type   Date Value Ref Range Status   2017 Positive  Final     Immunizations:   There is no immunization history on file for this patient.    Assessment/Plan   Assessment & Plan  Principal Problem:    Postpartum care and examination immediately after delivery  Active Problems:    Obesity affecting pregnancy in third trimester    Obesity in pregnancy, antepartum, third trimester      Wanda Solis is Day 1  post-partum  Vaginal, Spontaneous Delivery    .      Plan:  Continue current care .[place] still on O2.  Circ on hold.  Home in am.      Matt Zaman MD  2017  11:39 AM    "

## 2017-12-17 VITALS
HEART RATE: 99 BPM | HEIGHT: 64 IN | DIASTOLIC BLOOD PRESSURE: 84 MMHG | BODY MASS INDEX: 50.02 KG/M2 | RESPIRATION RATE: 20 BRPM | SYSTOLIC BLOOD PRESSURE: 138 MMHG | TEMPERATURE: 98.2 F | OXYGEN SATURATION: 98 % | WEIGHT: 293 LBS

## 2017-12-17 RX ORDER — IBUPROFEN 200 MG
TABLET ORAL 4 TIMES DAILY
Qty: 14 G | Refills: 2 | Status: SHIPPED | OUTPATIENT
Start: 2017-12-17 | End: 2018-02-07

## 2017-12-17 RX ORDER — HYDROCODONE BITARTRATE AND ACETAMINOPHEN 5; 325 MG/1; MG/1
2 TABLET ORAL EVERY 6 HOURS PRN
Qty: 15 TABLET | Refills: 0 | Status: SHIPPED | OUTPATIENT
Start: 2017-12-17 | End: 2017-12-25

## 2017-12-17 RX ADMIN — BACITRACIN ZINC NEOMYCIN SULFATE POLYMYXIN B SULFATE: 400; 3.5; 5 OINTMENT TOPICAL at 14:01

## 2017-12-17 RX ADMIN — IBUPROFEN 800 MG: 800 TABLET ORAL at 08:13

## 2017-12-17 RX ADMIN — BENZOCAINE AND MENTHOL: 20; .5 SPRAY TOPICAL at 14:01

## 2017-12-17 RX ADMIN — DICLOXACILLIN SODIUM 500 MG: 250 CAPSULE ORAL at 06:49

## 2017-12-17 RX ADMIN — HYDROCODONE BITARTRATE AND ACETAMINOPHEN 1 TABLET: 5; 325 TABLET ORAL at 08:13

## 2017-12-17 RX ADMIN — IBUPROFEN 800 MG: 800 TABLET ORAL at 14:01

## 2017-12-17 RX ADMIN — DOCUSATE SODIUM 100 MG: 100 CAPSULE, LIQUID FILLED ORAL at 08:13

## 2017-12-17 RX ADMIN — DICLOXACILLIN SODIUM 500 MG: 250 CAPSULE ORAL at 13:13

## 2017-12-17 RX ADMIN — HYDROCODONE BITARTRATE AND ACETAMINOPHEN 1 TABLET: 5; 325 TABLET ORAL at 14:00

## 2017-12-17 NOTE — PROGRESS NOTES
"Subjective:  Postpartum Day 2:     The patient feels well.  Pain is well controlled with current medications. The baby is well.  Urinary output is adequate. The patient is ambulating well. The patient is tolerating a normal diet. Flatus has been passed.      Objective:    Vital signs in last 24 hours:  Blood pressure 138/84, pulse 99, temperature 98.2 °F (36.8 °C), temperature source Oral, resp. rate 20, height 162.6 cm (64\"), weight (!) 138 kg (303 lb 9.6 oz), last menstrual period 02/21/2017, SpO2 98 %, unknown if currently breastfeeding.      General:    alert, appears stated age and cooperative   Uterine Fundus:   firm   Breasts:  Folds under breast with multiple skin eruptions bilaterally.     Labs    Rh + / Male infant in NICU    Assessment/Plan:.     Postpartum Day #2  - Breast Carbuncles.  Patient started on Dicloxicillin and topical therapy.  Will continue.  Patient to see [place] in 1 to 2 weeks.  Discussed skin hygiene.  - Circumcision.  Reviewed procedure and risks -- bleeding, infection, aesthetic issues.  Patient voices understanding.  Will perform today if ok by neonatology.  - Discussed discharge with patient.      Lucas Russell MD    "

## 2017-12-17 NOTE — PLAN OF CARE
Problem: Patient Care Overview (Adult)  Goal: Plan of Care Review  Outcome: Ongoing (interventions implemented as appropriate)    12/17/17 0447   Coping/Psychosocial Response Interventions   Plan Of Care Reviewed With patient   Patient Care Overview   Progress improving   Outcome Evaluation   Outcome Summary/Follow up Plan VSS, bleeding wnl, breast absess dressing changed with neosporin and gauze, pain tolerated with po medication       Goal: Discharge Needs Assessment    12/15/17 0926 12/16/17 0138   Discharge Needs Assessment   Concerns To Be Addressed --  denies needs/concerns at this time   Readmission Within The Last 30 Days no previous admission in last 30 days --    Equipment Needed After Discharge none --    Discharge Disposition home or self-care --    Living Environment   Transportation Available car --    Current Health   Anticipated Changes Related to Illness none --    Self-Care   Equipment Currently Used at Home none --          Problem: Postpartum, Vaginal Delivery (Adult)  Goal: Signs and Symptoms of Listed Potential Problems Will be Absent or Manageable (Postpartum, Vaginal Delivery)  Outcome: Ongoing (interventions implemented as appropriate)    12/17/17 0447   Postpartum, Vaginal Delivery   Problems Assessed (Postpartum Vaginal Delivery) all   Problems Present (Postpartum Vaginal Delivery) none

## 2018-02-07 ENCOUNTER — POSTPARTUM VISIT (OUTPATIENT)
Dept: OBSTETRICS AND GYNECOLOGY | Facility: CLINIC | Age: 28
End: 2018-02-07

## 2018-02-07 VITALS — WEIGHT: 282 LBS | BODY MASS INDEX: 48.14 KG/M2 | HEIGHT: 64 IN

## 2018-02-07 DIAGNOSIS — Z30.011 OCP (ORAL CONTRACEPTIVE PILLS) INITIATION: ICD-10-CM

## 2018-02-07 DIAGNOSIS — R87.810 ASCUS WITH POSITIVE HIGH RISK HPV CERVICAL: ICD-10-CM

## 2018-02-07 DIAGNOSIS — R87.610 ASCUS WITH POSITIVE HIGH RISK HPV CERVICAL: ICD-10-CM

## 2018-02-07 PROCEDURE — 0503F POSTPARTUM CARE VISIT: CPT | Performed by: OBSTETRICS & GYNECOLOGY

## 2018-02-07 RX ORDER — NORETHINDRONE ACETATE AND ETHINYL ESTRADIOL .03; 1.5 MG/1; MG/1
1 TABLET ORAL DAILY
Qty: 28 EACH | Refills: 12 | Status: SHIPPED | OUTPATIENT
Start: 2018-02-07 | End: 2018-06-26

## 2018-02-07 NOTE — PROGRESS NOTES
"Colton Solis is a 27 y.o. female here for Post Partum exam. Pt is s/p vaginal delivery on 12/15/2017. Pt is bottle feeding. Pt would like OCP's for contraception-Microgestin. Pt denies any baby blues.    History of Present Illness     27 y.o.  - 8 weeks postpartum   No complaints  Bottle feeding   No baby blues  Wants OCP to control cycles.     Last pap: 2016- ASCUS HPV+, biopsy 2016 Benign.       Review of Systems   Constitutional: Negative for chills and fever.   Gastrointestinal: Negative for abdominal pain.   Genitourinary: Negative for dysuria, pelvic pain, vaginal bleeding and vaginal discharge.   All other systems reviewed and are negative.      Objective    Ht 162.6 cm (64\")  Wt 128 kg (282 lb)  LMP  (Exact Date) Comment: 2 weeks ago  Breastfeeding? No  BMI 48.41 kg/m2  Physical Exam   Constitutional: She is oriented to person, place, and time. She appears well-developed and well-nourished. No distress.   HENT:   Head: Normocephalic and atraumatic.   Eyes: Conjunctivae are normal.   Neck: Normal range of motion. Neck supple. No thyromegaly present.   Cardiovascular: Normal rate and regular rhythm.    No murmur heard.  Pulmonary/Chest: Effort normal and breath sounds normal. Right breast exhibits no inverted nipple, no mass and no nipple discharge. Left breast exhibits no inverted nipple, no mass and no nipple discharge.   Abdominal: Soft. Bowel sounds are normal. She exhibits no distension. There is no tenderness.   Genitourinary: Vagina normal and uterus normal. Pelvic exam was performed with patient supine. There is no lesion on the right labia. There is no lesion on the left labia. Uterus is not enlarged (limited by habitus ), not fixed and not tender. Cervix exhibits no motion tenderness. Right adnexum displays no mass and no tenderness. Left adnexum displays no mass and no tenderness. No bleeding in the vagina. No vaginal discharge found.   Musculoskeletal: She " exhibits no edema.   Lymphadenopathy:        Right: No inguinal adenopathy present.        Left: No inguinal adenopathy present.   Neurological: She is alert and oriented to person, place, and time.   Skin: No rash noted.   Psychiatric: She has a normal mood and affect. Her behavior is normal.         Assessment/Plan   Problems Addressed this Visit     None      Visit Diagnoses     Postpartum state    -  Primary    OCP (oral contraceptive pills) initiation        ASCUS with positive high risk HPV cervical        Relevant Orders    Pap IG, Rfx HPV ASCU - ThinPrep Vial, Cervix        1) PP   Released from restrictions     2) OCP   How to start  Common/life threatening issues  Efficiency reviewed    3) Follow up pap   ASCUS HPV +  Repeated today     Amor Michele MD  2/7/2018  9:56 AM

## 2018-02-09 LAB
CONV .: NORMAL
CYTOLOGIST CVX/VAG CYTO: NORMAL
CYTOLOGY CVX/VAG DOC THIN PREP: NORMAL
DX ICD CODE: NORMAL
HIV 1 & 2 AB SER-IMP: NORMAL
Lab: NORMAL
OTHER STN SPEC: NORMAL
PATH REPORT.FINAL DX SPEC: NORMAL
STAT OF ADQ CVX/VAG CYTO-IMP: NORMAL

## 2018-06-26 ENCOUNTER — APPOINTMENT (OUTPATIENT)
Dept: ULTRASOUND IMAGING | Facility: HOSPITAL | Age: 28
End: 2018-06-26
Attending: EMERGENCY MEDICINE

## 2018-06-26 ENCOUNTER — HOSPITAL ENCOUNTER (EMERGENCY)
Facility: HOSPITAL | Age: 28
Discharge: HOME | End: 2018-06-26
Attending: EMERGENCY MEDICINE | Admitting: EMERGENCY MEDICINE

## 2018-06-26 VITALS
DIASTOLIC BLOOD PRESSURE: 76 MMHG | HEART RATE: 80 BPM | SYSTOLIC BLOOD PRESSURE: 122 MMHG | TEMPERATURE: 98.4 F | WEIGHT: 281.25 LBS | OXYGEN SATURATION: 97 % | HEIGHT: 65 IN | BODY MASS INDEX: 46.86 KG/M2 | RESPIRATION RATE: 18 BRPM

## 2018-06-26 DIAGNOSIS — Z34.90 INTRAUTERINE PREGNANCY: ICD-10-CM

## 2018-06-26 DIAGNOSIS — Z34.91 FIRST TRIMESTER PREGNANCY: ICD-10-CM

## 2018-06-26 DIAGNOSIS — R55 NEAR SYNCOPE: Primary | ICD-10-CM

## 2018-06-26 LAB
ALBUMIN SERPL-MCNC: 3.6 G/DL (ref 3.5–5.2)
ALBUMIN/GLOB SERPL: 0.9 G/DL
ALP SERPL-CCNC: 76 U/L (ref 39–117)
ALT SERPL W P-5'-P-CCNC: 10 U/L (ref 1–33)
ANION GAP SERPL CALCULATED.3IONS-SCNC: 11.5 MMOL/L
AST SERPL-CCNC: 9 U/L (ref 1–32)
BASOPHILS # BLD AUTO: 0.01 10*3/MM3 (ref 0–0.2)
BASOPHILS NFR BLD AUTO: 0.1 % (ref 0–1.5)
BILIRUB SERPL-MCNC: 0.3 MG/DL (ref 0.1–1.2)
BUN BLD-MCNC: 7 MG/DL (ref 6–20)
BUN/CREAT SERPL: 10.1 (ref 7–25)
CALCIUM SPEC-SCNC: 9.1 MG/DL (ref 8.6–10.5)
CHLORIDE SERPL-SCNC: 105 MMOL/L (ref 98–107)
CO2 SERPL-SCNC: 21.5 MMOL/L (ref 22–29)
CREAT BLD-MCNC: 0.69 MG/DL (ref 0.57–1)
DEPRECATED RDW RBC AUTO: 48.3 FL (ref 37–54)
EOSINOPHIL # BLD AUTO: 0.09 10*3/MM3 (ref 0–0.7)
EOSINOPHIL NFR BLD AUTO: 1.3 % (ref 0.3–6.2)
ERYTHROCYTE [DISTWIDTH] IN BLOOD BY AUTOMATED COUNT: 17.2 % (ref 11.7–13)
GFR SERPL CREATININE-BSD FRML MDRD: 121 ML/MIN/1.73
GLOBULIN UR ELPH-MCNC: 3.9 GM/DL
GLUCOSE BLD-MCNC: 101 MG/DL (ref 65–99)
HCG INTACT+B SERPL-ACNC: NORMAL MIU/ML
HCG SERPL QL: POSITIVE
HCT VFR BLD AUTO: 34.8 % (ref 35.6–45.5)
HGB BLD-MCNC: 11.5 G/DL (ref 11.9–15.5)
IMM GRANULOCYTES # BLD: 0 10*3/MM3 (ref 0–0.03)
IMM GRANULOCYTES NFR BLD: 0 % (ref 0–0.5)
LYMPHOCYTES # BLD AUTO: 1.73 10*3/MM3 (ref 0.9–4.8)
LYMPHOCYTES NFR BLD AUTO: 25.8 % (ref 19.6–45.3)
MCH RBC QN AUTO: 25.4 PG (ref 26.9–32)
MCHC RBC AUTO-ENTMCNC: 33 G/DL (ref 32.4–36.3)
MCV RBC AUTO: 76.8 FL (ref 80.5–98.2)
MONOCYTES # BLD AUTO: 0.28 10*3/MM3 (ref 0.2–1.2)
MONOCYTES NFR BLD AUTO: 4.2 % (ref 5–12)
NEUTROPHILS # BLD AUTO: 4.6 10*3/MM3 (ref 1.9–8.1)
NEUTROPHILS NFR BLD AUTO: 68.6 % (ref 42.7–76)
PLATELET # BLD AUTO: 276 10*3/MM3 (ref 140–500)
PMV BLD AUTO: 9.7 FL (ref 6–12)
POTASSIUM BLD-SCNC: 4.2 MMOL/L (ref 3.5–5.2)
PROT SERPL-MCNC: 7.5 G/DL (ref 6–8.5)
RBC # BLD AUTO: 4.53 10*6/MM3 (ref 3.9–5.2)
SODIUM BLD-SCNC: 138 MMOL/L (ref 136–145)
WBC NRBC COR # BLD: 6.71 10*3/MM3 (ref 4.5–10.7)

## 2018-06-26 PROCEDURE — 84702 CHORIONIC GONADOTROPIN TEST: CPT | Performed by: EMERGENCY MEDICINE

## 2018-06-26 PROCEDURE — 76815 OB US LIMITED FETUS(S): CPT

## 2018-06-26 PROCEDURE — 93976 VASCULAR STUDY: CPT

## 2018-06-26 PROCEDURE — 93005 ELECTROCARDIOGRAM TRACING: CPT | Performed by: PHYSICIAN ASSISTANT

## 2018-06-26 PROCEDURE — 36415 COLL VENOUS BLD VENIPUNCTURE: CPT | Performed by: PHYSICIAN ASSISTANT

## 2018-06-26 PROCEDURE — 80053 COMPREHEN METABOLIC PANEL: CPT | Performed by: PHYSICIAN ASSISTANT

## 2018-06-26 PROCEDURE — 96360 HYDRATION IV INFUSION INIT: CPT

## 2018-06-26 PROCEDURE — 99284 EMERGENCY DEPT VISIT MOD MDM: CPT

## 2018-06-26 PROCEDURE — 76817 TRANSVAGINAL US OBSTETRIC: CPT

## 2018-06-26 PROCEDURE — 93010 ELECTROCARDIOGRAM REPORT: CPT | Performed by: INTERNAL MEDICINE

## 2018-06-26 PROCEDURE — 84703 CHORIONIC GONADOTROPIN ASSAY: CPT | Performed by: PHYSICIAN ASSISTANT

## 2018-06-26 PROCEDURE — 85025 COMPLETE CBC W/AUTO DIFF WBC: CPT | Performed by: PHYSICIAN ASSISTANT

## 2018-06-26 RX ORDER — PNV NO.118/IRON FUMARATE/FA 29 MG-1 MG
1 TABLET,CHEWABLE ORAL DAILY
Qty: 30 TABLET | Refills: 0 | Status: SHIPPED | OUTPATIENT
Start: 2018-06-26 | End: 2018-07-05

## 2018-06-26 RX ORDER — SODIUM CHLORIDE 0.9 % (FLUSH) 0.9 %
10 SYRINGE (ML) INJECTION AS NEEDED
Status: DISCONTINUED | OUTPATIENT
Start: 2018-06-26 | End: 2018-06-26 | Stop reason: HOSPADM

## 2018-06-26 RX ADMIN — SODIUM CHLORIDE 1000 ML: 9 INJECTION, SOLUTION INTRAVENOUS at 13:31

## 2018-06-27 ENCOUNTER — TELEPHONE (OUTPATIENT)
Dept: SOCIAL WORK | Facility: HOSPITAL | Age: 28
End: 2018-06-27

## 2018-06-27 PROBLEM — R55 NEAR SYNCOPE: Status: ACTIVE | Noted: 2018-06-27

## 2018-06-27 NOTE — TELEPHONE ENCOUNTER
ED f/u phone call: states she feels better today, has not yet gotten script filled and has f/u justin't w/ OB/GYN next week. No questions/concerns

## 2018-07-05 ENCOUNTER — INITIAL PRENATAL (OUTPATIENT)
Dept: OBSTETRICS AND GYNECOLOGY | Facility: CLINIC | Age: 28
End: 2018-07-05

## 2018-07-05 VITALS — SYSTOLIC BLOOD PRESSURE: 129 MMHG | WEIGHT: 276 LBS | DIASTOLIC BLOOD PRESSURE: 83 MMHG | BODY MASS INDEX: 45.93 KG/M2

## 2018-07-05 DIAGNOSIS — O99.211 OBESITY COMPLICATING PREGNANCY IN FIRST TRIMESTER: ICD-10-CM

## 2018-07-05 DIAGNOSIS — D58.2 HEMOGLOBIN C TRAIT (HCC): ICD-10-CM

## 2018-07-05 DIAGNOSIS — Z34.81 ENCOUNTER FOR SUPERVISION OF OTHER NORMAL PREGNANCY IN FIRST TRIMESTER: Primary | ICD-10-CM

## 2018-07-05 DIAGNOSIS — Z11.3 SCREEN FOR STD (SEXUALLY TRANSMITTED DISEASE): ICD-10-CM

## 2018-07-05 PROCEDURE — 0501F PRENATAL FLOW SHEET: CPT | Performed by: OBSTETRICS & GYNECOLOGY

## 2018-07-05 NOTE — PROGRESS NOTES
Initial ob visit     CC- Here for care of pregnancy     Wanda Solis is being seen today for her first obstetrical visit.  She is a 28 y.o.    9w3d.     #: 1, Date: None, Sex: None, Weight: None, GA: None, Delivery: None, Apgar1: None, Apgar5: None, Living: None, Birth Comments: None    #: 2, Date: None, Sex: None, Weight: None, GA: None, Delivery: None, Apgar1: None, Apgar5: None, Living: None, Birth Comments: None    #: 3, Date: 09, Sex: Female, Weight: 3402 g (7 lb 8 oz), GA: 40w0d, Delivery: Vaginal, Spontaneous Delivery, Apgar1: None, Apgar5: None, Living: Living, Birth Comments: None    #: 4, Date: 12/15/17, Sex: Male, Weight: 3385 g (7 lb 7.4 oz), GA: 39w2d, Delivery: Vaginal, Spontaneous Delivery, Apgar1: 7, Apgar5: 8, Living: Living, Birth Comments: None    #: 5, Date: None, Sex: None, Weight: None, GA: None, Delivery: None, Apgar1: None, Apgar5: None, Living: None, Birth Comments: None      Current obstetric complaints : dizziness, faintish, passed out at work.  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    Past Medical History:   Diagnosis Date   • Abnormal Pap smear of cervix    • Urogenital trichomoniasis        Past Surgical History:   Procedure Laterality Date   • DILATATION AND CURETTAGE     • WISDOM TOOTH EXTRACTION         No current outpatient prescriptions on file.    No Known Allergies    Social History     Social History   • Marital status: Single     Spouse name: N/A   • Number of children: N/A   • Years of education: N/A     Occupational History   • Not on file.     Social History Main Topics   • Smoking status: Never  Smoker   • Smokeless tobacco: Never Used   • Alcohol use No   • Drug use: No   • Sexual activity: Yes     Partners: Male     Birth control/ protection: None     Other Topics Concern   • Not on file     Social History Narrative   • No narrative on file       Family History   Problem Relation Age of Onset   • Breast cancer Neg Hx    • Ovarian cancer Neg Hx    • Uterine cancer Neg Hx    • Colon cancer Neg Hx    • Deep vein thrombosis Neg Hx    • Pulmonary embolism Neg Hx        Review of systems     A comprehensive review of systems was negative. except dizziness      Objective    /83   Wt 125 kg (276 lb)   LMP 03/28/2018 (Approximate)   BMI 45.93 kg/m²       General Appearance:    Alert, cooperative, in no acute distress, habitus obese    Head:    Normocephalic, without obvious abnormality, atraumatic   Eyes:            Lids and lashes normal, conjunctivae and sclerae normal, no   icterus, no pallor, corneas clear   Ears:    Ears appear intact with no abnormalities noted       Neck:   No adenopathy, supple, trachea midline, no thyromegaly   Back:     No kyphosis present, no scoliosis present,                       Lungs:     Clear to auscultation,respirations regular, even and                   unlabored    Heart:    Regular rhythm and normal rate, normal S1 and S2, no            murmur, no gallop, no rub, no click   Breast Exam:    No masses, No nipple discharge   Abdomen:     Normal bowel sounds, no masses, no organomegaly, soft        non-tender, non-distended, no guarding, no rebound                 tenderness   Genitalia:    Vulva - BUS-WNL, NEFG    Vagina - No discharge, No bleeding    Cervix - No Lesions, closed     Uterus - Consistent with 9 weeks, anteverted     Adnexa - No augustine, NT    Pelvimetry - clinically adequate, gynecoid pelvis     Extremities:   Moves all extremities well, no edema, no cyanosis, no              redness   Pulses:   Pulses palpable and equal bilaterally   Skin:   No bleeding,  bruising or rash   Lymph nodes:   No palpable adenopathy   Neurologic:   Sensation intact, A&O times 3      Assessment    1) Pregnancy at 9w3d   2) Obesity in pregnancy   Limit overall gain to 10#   3) Hg C trait - Encouraged FOB  4) Nausea in pregnancy        Plan    Initial labs drawn, GC/CHL screen done  Patient is on Prenatal vitamins  Problem list reviewed and updated.  Reviewed routine prenatal care with the office to include but not limited to   Zika (travel restrictions/ok to use insect repellant), not to changing cat litter, food restrictions, avoidance of alcohol, tobacco and drugs and saunas/hot tubs.   All questions answered.     Amor Michele MD   7/5/2018  1:02 PM

## 2018-07-06 LAB
ABO GROUP BLD: (no result)
BASOPHILS # BLD AUTO: 0 X10E3/UL (ref 0–0.2)
BASOPHILS NFR BLD AUTO: 0 %
BLD GP AB SCN SERPL QL: NEGATIVE
EOSINOPHIL # BLD AUTO: 0.2 X10E3/UL (ref 0–0.4)
EOSINOPHIL NFR BLD AUTO: 2 %
ERYTHROCYTE [DISTWIDTH] IN BLOOD BY AUTOMATED COUNT: 18.1 % (ref 12.3–15.4)
HBV SURFACE AG SERPL QL IA: NEGATIVE
HCT VFR BLD AUTO: 34.2 % (ref 34–46.6)
HCV AB S/CO SERPL IA: <0.1 S/CO RATIO (ref 0–0.9)
HGB BLD-MCNC: 11.8 G/DL (ref 11.1–15.9)
HIV 1+2 AB+HIV1 P24 AG SERPL QL IA: NON REACTIVE
IMM GRANULOCYTES # BLD: 0 X10E3/UL (ref 0–0.1)
IMM GRANULOCYTES NFR BLD: 0 %
LYMPHOCYTES # BLD AUTO: 2.3 X10E3/UL (ref 0.7–3.1)
LYMPHOCYTES NFR BLD AUTO: 34 %
MCH RBC QN AUTO: 25.7 PG (ref 26.6–33)
MCHC RBC AUTO-ENTMCNC: 34.5 G/DL (ref 31.5–35.7)
MCV RBC AUTO: 75 FL (ref 79–97)
MONOCYTES # BLD AUTO: 0.3 X10E3/UL (ref 0.1–0.9)
MONOCYTES NFR BLD AUTO: 4 %
NEUTROPHILS # BLD AUTO: 3.9 X10E3/UL (ref 1.4–7)
NEUTROPHILS NFR BLD AUTO: 60 %
PLATELET # BLD AUTO: 313 X10E3/UL (ref 150–379)
RBC # BLD AUTO: 4.59 X10E6/UL (ref 3.77–5.28)
RH BLD: POSITIVE
RPR SER QL: NON REACTIVE
RUBV IGG SERPL IA-ACNC: 2.87 INDEX
WBC # BLD AUTO: 6.6 X10E3/UL (ref 3.4–10.8)

## 2018-07-07 LAB
C TRACH RRNA SPEC QL NAA+PROBE: NEGATIVE
N GONORRHOEA RRNA SPEC QL NAA+PROBE: NEGATIVE
T VAGINALIS RRNA SPEC QL NAA+PROBE: NEGATIVE

## 2018-08-02 ENCOUNTER — ROUTINE PRENATAL (OUTPATIENT)
Dept: OBSTETRICS AND GYNECOLOGY | Facility: CLINIC | Age: 28
End: 2018-08-02

## 2018-08-02 VITALS — SYSTOLIC BLOOD PRESSURE: 116 MMHG | DIASTOLIC BLOOD PRESSURE: 78 MMHG | BODY MASS INDEX: 45.93 KG/M2 | WEIGHT: 276 LBS

## 2018-08-02 DIAGNOSIS — O99.211 OBESITY COMPLICATING PREGNANCY IN FIRST TRIMESTER: ICD-10-CM

## 2018-08-02 DIAGNOSIS — Z34.81 ENCOUNTER FOR SUPERVISION OF OTHER NORMAL PREGNANCY IN FIRST TRIMESTER: Primary | ICD-10-CM

## 2018-08-02 PROCEDURE — 0502F SUBSEQUENT PRENATAL CARE: CPT | Performed by: OBSTETRICS & GYNECOLOGY

## 2018-08-02 NOTE — PROGRESS NOTES
OB follow up     Wanda Solis is a 28 y.o.  13w3d being seen today for her obstetrical visit.  Patient reports dizziness. Fetal movement: absent..    Review of Systems  No bleeding, No cramping/contractions     /78   Wt 125 kg (276 lb)   LMP 2018 (Approximate)   BMI 45.93 kg/m²     FHT: 156 BPM   Uterine Size: 13 cm       Assessment    1) pregnancy at 13w3d   Doing well.   Labs and ultrasound reviewed  Discussed dizziness  Down syndrome screening - Declined       Plan    Reviewed this stage of pregnancy  Problem list updated   Follow up in 3 weeks    Amor Michele MD   2018  1:31 PM

## 2018-08-23 ENCOUNTER — ROUTINE PRENATAL (OUTPATIENT)
Dept: OBSTETRICS AND GYNECOLOGY | Facility: CLINIC | Age: 28
End: 2018-08-23

## 2018-08-23 VITALS — SYSTOLIC BLOOD PRESSURE: 128 MMHG | BODY MASS INDEX: 45.43 KG/M2 | WEIGHT: 273 LBS | DIASTOLIC BLOOD PRESSURE: 86 MMHG

## 2018-08-23 DIAGNOSIS — Z34.82 ENCOUNTER FOR SUPERVISION OF OTHER NORMAL PREGNANCY IN SECOND TRIMESTER: Primary | ICD-10-CM

## 2018-08-23 PROCEDURE — 0502F SUBSEQUENT PRENATAL CARE: CPT | Performed by: OBSTETRICS & GYNECOLOGY

## 2018-08-23 NOTE — PROGRESS NOTES
OB follow up     Wanda Solis is a 28 y.o.  16w3d being seen today for her obstetrical visit.  Patient reports no complaints. Fetal movement: absent.    Review of Systems  No bleeding, No cramping/contractions     /86   Wt 124 kg (273 lb)   LMP 2018 (Approximate)   BMI 45.43 kg/m²     FHT: 150 BPM   Uterine Size: 16 cm       Assessment    1) pregnancy at 16w3d   Quickening reviewed   Anatomy next visit  Declined down screening   2) Obesity stable.       Plan    Reviewed this stage of pregnancy  Problem list updated   Follow up in 4 weeks    Amor Michele MD   2018  2:44 PM

## 2018-08-27 ENCOUNTER — HOSPITAL ENCOUNTER (EMERGENCY)
Facility: HOSPITAL | Age: 28
Discharge: HOME | End: 2018-08-27
Attending: EMERGENCY MEDICINE | Admitting: EMERGENCY MEDICINE

## 2018-08-27 VITALS
RESPIRATION RATE: 16 BRPM | BODY MASS INDEX: 50.24 KG/M2 | TEMPERATURE: 100.4 F | HEIGHT: 62 IN | HEART RATE: 95 BPM | DIASTOLIC BLOOD PRESSURE: 84 MMHG | SYSTOLIC BLOOD PRESSURE: 101 MMHG | OXYGEN SATURATION: 100 % | WEIGHT: 273 LBS

## 2018-08-27 DIAGNOSIS — N61.0 CELLULITIS OF LEFT BREAST: Primary | ICD-10-CM

## 2018-08-27 PROCEDURE — 99283 EMERGENCY DEPT VISIT LOW MDM: CPT

## 2018-08-27 RX ORDER — CEPHALEXIN 500 MG/1
500 CAPSULE ORAL 3 TIMES DAILY
Qty: 30 CAPSULE | Refills: 0 | Status: SHIPPED | OUTPATIENT
Start: 2018-08-27 | End: 2018-09-04 | Stop reason: HOSPADM

## 2018-08-29 ENCOUNTER — ROUTINE PRENATAL (OUTPATIENT)
Dept: OBSTETRICS AND GYNECOLOGY | Facility: CLINIC | Age: 28
End: 2018-08-29

## 2018-08-29 VITALS — WEIGHT: 276 LBS | BODY MASS INDEX: 50.48 KG/M2 | SYSTOLIC BLOOD PRESSURE: 123 MMHG | DIASTOLIC BLOOD PRESSURE: 81 MMHG

## 2018-08-29 DIAGNOSIS — O91.212: ICD-10-CM

## 2018-08-29 DIAGNOSIS — Z34.82 ENCOUNTER FOR SUPERVISION OF OTHER NORMAL PREGNANCY IN SECOND TRIMESTER: Primary | ICD-10-CM

## 2018-08-29 PROCEDURE — 0502F SUBSEQUENT PRENATAL CARE: CPT | Performed by: OBSTETRICS & GYNECOLOGY

## 2018-08-29 NOTE — PROGRESS NOTES
OB follow up     Wanda Solis is a 28 y.o.  17w2d being seen today for her obstetrical visit.  Patient reports L breast pain with aboil. Pt was seen in the ER Monday for the pain and given antibiotics. Pt is going to start medicine today.. Fetal movement: absent.        Review of Systems  No bleeding, No cramping/contractions     /81   Wt 125 kg (276 lb)   LMP 2018 (Approximate)   BMI 50.48 kg/m²     FHT: 154 BPM   Uterine Size: 17 cm       Assessment    1) pregnancy at 17w2d   2) Left breast medial aspect with large boil and surrounding erythema and tenderness  Given Keflex at ER Monday (Just picked up today) 500 mg PO TID   Refer to breast surgeon to assess if needs drainage (if abscess)     START antibiotic ASAP   Call if not improved in next 48-72 hours   Call if febrile   Refer to surgery for possible I&D  Return in one week to verify good response to treatment   Use Keflex QID and call to make sure gets full 10 days   Return to review next week.   Off work already tomorrow   Call if need to extend       Plan    Reviewed this stage of pregnancy  Problem list updated   Follow up in 1 weeks    Amor Michele MD   2018  4:04 PM

## 2018-09-01 ENCOUNTER — HOSPITAL ENCOUNTER (INPATIENT)
Facility: HOSPITAL | Age: 28
LOS: 3 days | Discharge: HOME | End: 2018-09-04
Attending: OBSTETRICS & GYNECOLOGY | Admitting: OBSTETRICS & GYNECOLOGY

## 2018-09-01 ENCOUNTER — ANESTHESIA (OUTPATIENT)
Dept: PERIOP | Facility: HOSPITAL | Age: 28
End: 2018-09-01

## 2018-09-01 ENCOUNTER — ANESTHESIA EVENT (OUTPATIENT)
Dept: PERIOP | Facility: HOSPITAL | Age: 28
End: 2018-09-01

## 2018-09-01 DIAGNOSIS — N61.0 MASTITIS OF LEFT BREAST UNRELATED TO PREGNANCY OR BREASTFEEDING: Primary | ICD-10-CM

## 2018-09-01 DIAGNOSIS — O91.119 BREAST ABSCESS DURING PREGNANCY, ANTEPARTUM: ICD-10-CM

## 2018-09-01 DIAGNOSIS — N61.1 LEFT BREAST ABSCESS: ICD-10-CM

## 2018-09-01 LAB
ALBUMIN SERPL-MCNC: 3.3 G/DL (ref 3.5–5.2)
ALBUMIN/GLOB SERPL: 0.9 G/DL
ALP SERPL-CCNC: 74 U/L (ref 39–117)
ALT SERPL W P-5'-P-CCNC: 11 U/L (ref 1–33)
ANION GAP SERPL CALCULATED.3IONS-SCNC: 10 MMOL/L
AST SERPL-CCNC: 10 U/L (ref 1–32)
BASOPHILS # BLD AUTO: 0.01 10*3/MM3 (ref 0–0.2)
BASOPHILS NFR BLD AUTO: 0.1 % (ref 0–1.5)
BILIRUB SERPL-MCNC: 0.3 MG/DL (ref 0.1–1.2)
BUN BLD-MCNC: 3 MG/DL (ref 6–20)
BUN/CREAT SERPL: 6.3 (ref 7–25)
CALCIUM SPEC-SCNC: 8.7 MG/DL (ref 8.6–10.5)
CHLORIDE SERPL-SCNC: 104 MMOL/L (ref 98–107)
CO2 SERPL-SCNC: 21 MMOL/L (ref 22–29)
CREAT BLD-MCNC: 0.48 MG/DL (ref 0.57–1)
D-LACTATE SERPL-SCNC: 0.6 MMOL/L (ref 0.5–2)
DEPRECATED RDW RBC AUTO: 44.4 FL (ref 37–54)
EOSINOPHIL # BLD AUTO: 0.18 10*3/MM3 (ref 0–0.7)
EOSINOPHIL NFR BLD AUTO: 2 % (ref 0.3–6.2)
ERYTHROCYTE [DISTWIDTH] IN BLOOD BY AUTOMATED COUNT: 15.9 % (ref 11.7–13)
GFR SERPL CREATININE-BSD FRML MDRD: >150 ML/MIN/1.73
GLOBULIN UR ELPH-MCNC: 3.7 GM/DL
GLUCOSE BLD-MCNC: 97 MG/DL (ref 65–99)
HCT VFR BLD AUTO: 27.4 % (ref 35.6–45.5)
HGB BLD-MCNC: 9.1 G/DL (ref 11.9–15.5)
IMM GRANULOCYTES # BLD: 0.02 10*3/MM3 (ref 0–0.03)
IMM GRANULOCYTES NFR BLD: 0.2 % (ref 0–0.5)
LYMPHOCYTES # BLD AUTO: 1.44 10*3/MM3 (ref 0.9–4.8)
LYMPHOCYTES NFR BLD AUTO: 15.8 % (ref 19.6–45.3)
MCH RBC QN AUTO: 25.3 PG (ref 26.9–32)
MCHC RBC AUTO-ENTMCNC: 33.2 G/DL (ref 32.4–36.3)
MCV RBC AUTO: 76.1 FL (ref 80.5–98.2)
MONOCYTES # BLD AUTO: 0.72 10*3/MM3 (ref 0.2–1.2)
MONOCYTES NFR BLD AUTO: 7.9 % (ref 5–12)
NEUTROPHILS # BLD AUTO: 6.74 10*3/MM3 (ref 1.9–8.1)
NEUTROPHILS NFR BLD AUTO: 74.2 % (ref 42.7–76)
PLATELET # BLD AUTO: 276 10*3/MM3 (ref 140–500)
PMV BLD AUTO: 9.1 FL (ref 6–12)
POTASSIUM BLD-SCNC: 3.6 MMOL/L (ref 3.5–5.2)
PROT SERPL-MCNC: 7 G/DL (ref 6–8.5)
RBC # BLD AUTO: 3.6 10*6/MM3 (ref 3.9–5.2)
SODIUM BLD-SCNC: 135 MMOL/L (ref 136–145)
WBC NRBC COR # BLD: 9.09 10*3/MM3 (ref 4.5–10.7)

## 2018-09-01 PROCEDURE — 87076 CULTURE ANAEROBE IDENT EACH: CPT | Performed by: SURGERY

## 2018-09-01 PROCEDURE — 83605 ASSAY OF LACTIC ACID: CPT | Performed by: NURSE PRACTITIONER

## 2018-09-01 PROCEDURE — 87075 CULTR BACTERIA EXCEPT BLOOD: CPT | Performed by: SURGERY

## 2018-09-01 PROCEDURE — 87186 SC STD MICRODIL/AGAR DIL: CPT | Performed by: SURGERY

## 2018-09-01 PROCEDURE — 80053 COMPREHEN METABOLIC PANEL: CPT | Performed by: NURSE PRACTITIONER

## 2018-09-01 PROCEDURE — 25010000002 MORPHINE PER 10 MG: Performed by: NURSE PRACTITIONER

## 2018-09-01 PROCEDURE — 25010000002 SUCCINYLCHOLINE PER 20 MG: Performed by: NURSE ANESTHETIST, CERTIFIED REGISTERED

## 2018-09-01 PROCEDURE — 87040 BLOOD CULTURE FOR BACTERIA: CPT | Performed by: NURSE PRACTITIONER

## 2018-09-01 PROCEDURE — 87070 CULTURE OTHR SPECIMN AEROBIC: CPT | Performed by: NURSE PRACTITIONER

## 2018-09-01 PROCEDURE — 0H9U0ZZ DRAINAGE OF LEFT BREAST, OPEN APPROACH: ICD-10-PCS | Performed by: SURGERY

## 2018-09-01 PROCEDURE — 25010000002 FENTANYL CITRATE (PF) 100 MCG/2ML SOLUTION: Performed by: NURSE ANESTHETIST, CERTIFIED REGISTERED

## 2018-09-01 PROCEDURE — 87205 SMEAR GRAM STAIN: CPT | Performed by: NURSE PRACTITIONER

## 2018-09-01 PROCEDURE — 85025 COMPLETE CBC W/AUTO DIFF WBC: CPT | Performed by: NURSE PRACTITIONER

## 2018-09-01 PROCEDURE — 25010000002 PROPOFOL 10 MG/ML EMULSION: Performed by: NURSE ANESTHETIST, CERTIFIED REGISTERED

## 2018-09-01 PROCEDURE — 87205 SMEAR GRAM STAIN: CPT | Performed by: SURGERY

## 2018-09-01 PROCEDURE — 25010000002 ONDANSETRON PER 1 MG: Performed by: NURSE PRACTITIONER

## 2018-09-01 PROCEDURE — 87070 CULTURE OTHR SPECIMN AEROBIC: CPT | Performed by: SURGERY

## 2018-09-01 PROCEDURE — 99221 1ST HOSP IP/OBS SF/LOW 40: CPT | Performed by: OBSTETRICS & GYNECOLOGY

## 2018-09-01 PROCEDURE — 99284 EMERGENCY DEPT VISIT MOD MDM: CPT

## 2018-09-01 PROCEDURE — 87185 SC STD ENZYME DETCJ PER NZM: CPT | Performed by: SURGERY

## 2018-09-01 RX ORDER — LIDOCAINE HYDROCHLORIDE 20 MG/ML
INJECTION, SOLUTION INFILTRATION; PERINEURAL AS NEEDED
Status: DISCONTINUED | OUTPATIENT
Start: 2018-09-01 | End: 2018-09-01 | Stop reason: SURG

## 2018-09-01 RX ORDER — ONDANSETRON 2 MG/ML
4 INJECTION INTRAMUSCULAR; INTRAVENOUS ONCE
Status: COMPLETED | OUTPATIENT
Start: 2018-09-01 | End: 2018-09-01

## 2018-09-01 RX ORDER — ONDANSETRON 2 MG/ML
4 INJECTION INTRAMUSCULAR; INTRAVENOUS ONCE AS NEEDED
Status: DISCONTINUED | OUTPATIENT
Start: 2018-09-01 | End: 2018-09-01 | Stop reason: HOSPADM

## 2018-09-01 RX ORDER — SODIUM CHLORIDE 0.9 % (FLUSH) 0.9 %
1-10 SYRINGE (ML) INJECTION AS NEEDED
Status: DISCONTINUED | OUTPATIENT
Start: 2018-09-01 | End: 2018-09-01 | Stop reason: HOSPADM

## 2018-09-01 RX ORDER — EPHEDRINE SULFATE 50 MG/ML
5 INJECTION, SOLUTION INTRAVENOUS ONCE AS NEEDED
Status: DISCONTINUED | OUTPATIENT
Start: 2018-09-01 | End: 2018-09-01 | Stop reason: HOSPADM

## 2018-09-01 RX ORDER — BUPIVACAINE HYDROCHLORIDE AND EPINEPHRINE 2.5; 5 MG/ML; UG/ML
INJECTION, SOLUTION EPIDURAL; INFILTRATION; INTRACAUDAL; PERINEURAL AS NEEDED
Status: DISCONTINUED | OUTPATIENT
Start: 2018-09-01 | End: 2018-09-01 | Stop reason: HOSPADM

## 2018-09-01 RX ORDER — FENTANYL CITRATE 50 UG/ML
INJECTION, SOLUTION INTRAMUSCULAR; INTRAVENOUS AS NEEDED
Status: DISCONTINUED | OUTPATIENT
Start: 2018-09-01 | End: 2018-09-01 | Stop reason: SURG

## 2018-09-01 RX ORDER — PROMETHAZINE HYDROCHLORIDE 25 MG/1
25 TABLET ORAL ONCE AS NEEDED
Status: DISCONTINUED | OUTPATIENT
Start: 2018-09-01 | End: 2018-09-01 | Stop reason: HOSPADM

## 2018-09-01 RX ORDER — FAMOTIDINE 10 MG/ML
20 INJECTION, SOLUTION INTRAVENOUS ONCE
Status: COMPLETED | OUTPATIENT
Start: 2018-09-01 | End: 2018-09-01

## 2018-09-01 RX ORDER — FENTANYL CITRATE 50 UG/ML
50 INJECTION, SOLUTION INTRAMUSCULAR; INTRAVENOUS
Status: DISCONTINUED | OUTPATIENT
Start: 2018-09-01 | End: 2018-09-01 | Stop reason: HOSPADM

## 2018-09-01 RX ORDER — NALOXONE HCL 0.4 MG/ML
0.2 VIAL (ML) INJECTION AS NEEDED
Status: DISCONTINUED | OUTPATIENT
Start: 2018-09-01 | End: 2018-09-01 | Stop reason: HOSPADM

## 2018-09-01 RX ORDER — OXYCODONE HYDROCHLORIDE AND ACETAMINOPHEN 5; 325 MG/1; MG/1
1 TABLET ORAL EVERY 4 HOURS PRN
Status: DISCONTINUED | OUTPATIENT
Start: 2018-09-01 | End: 2018-09-04 | Stop reason: HOSPADM

## 2018-09-01 RX ORDER — ONDANSETRON 4 MG/1
4 TABLET, ORALLY DISINTEGRATING ORAL EVERY 6 HOURS PRN
Status: DISCONTINUED | OUTPATIENT
Start: 2018-09-01 | End: 2018-09-04 | Stop reason: HOSPADM

## 2018-09-01 RX ORDER — HYDROCODONE BITARTRATE AND ACETAMINOPHEN 7.5; 325 MG/1; MG/1
1 TABLET ORAL ONCE AS NEEDED
Status: DISCONTINUED | OUTPATIENT
Start: 2018-09-01 | End: 2018-09-01 | Stop reason: HOSPADM

## 2018-09-01 RX ORDER — TRISODIUM CITRATE DIHYDRATE AND CITRIC ACID MONOHYDRATE 500; 334 MG/5ML; MG/5ML
30 SOLUTION ORAL ONCE
Status: COMPLETED | OUTPATIENT
Start: 2018-09-01 | End: 2018-09-01

## 2018-09-01 RX ORDER — PROMETHAZINE HYDROCHLORIDE 25 MG/ML
12.5 INJECTION, SOLUTION INTRAMUSCULAR; INTRAVENOUS ONCE AS NEEDED
Status: DISCONTINUED | OUTPATIENT
Start: 2018-09-01 | End: 2018-09-01 | Stop reason: HOSPADM

## 2018-09-01 RX ORDER — SODIUM CHLORIDE, SODIUM LACTATE, POTASSIUM CHLORIDE, CALCIUM CHLORIDE 600; 310; 30; 20 MG/100ML; MG/100ML; MG/100ML; MG/100ML
100 INJECTION, SOLUTION INTRAVENOUS CONTINUOUS
Status: DISCONTINUED | OUTPATIENT
Start: 2018-09-01 | End: 2018-09-04 | Stop reason: HOSPADM

## 2018-09-01 RX ORDER — CLINDAMYCIN PHOSPHATE 600 MG/50ML
600 INJECTION INTRAVENOUS ONCE
Status: COMPLETED | OUTPATIENT
Start: 2018-09-01 | End: 2018-09-01

## 2018-09-01 RX ORDER — PROMETHAZINE HYDROCHLORIDE 25 MG/1
25 SUPPOSITORY RECTAL ONCE AS NEEDED
Status: DISCONTINUED | OUTPATIENT
Start: 2018-09-01 | End: 2018-09-01 | Stop reason: HOSPADM

## 2018-09-01 RX ORDER — ONDANSETRON 4 MG/1
4 TABLET, FILM COATED ORAL EVERY 6 HOURS PRN
Status: DISCONTINUED | OUTPATIENT
Start: 2018-09-01 | End: 2018-09-04 | Stop reason: HOSPADM

## 2018-09-01 RX ORDER — LIDOCAINE HYDROCHLORIDE 10 MG/ML
0.5 INJECTION, SOLUTION EPIDURAL; INFILTRATION; INTRACAUDAL; PERINEURAL ONCE AS NEEDED
Status: DISCONTINUED | OUTPATIENT
Start: 2018-09-01 | End: 2018-09-01 | Stop reason: HOSPADM

## 2018-09-01 RX ORDER — OXYCODONE HYDROCHLORIDE AND ACETAMINOPHEN 5; 325 MG/1; MG/1
2 TABLET ORAL EVERY 4 HOURS PRN
Status: DISCONTINUED | OUTPATIENT
Start: 2018-09-01 | End: 2018-09-04 | Stop reason: HOSPADM

## 2018-09-01 RX ORDER — SODIUM CHLORIDE 0.9 % (FLUSH) 0.9 %
10 SYRINGE (ML) INJECTION AS NEEDED
Status: DISCONTINUED | OUTPATIENT
Start: 2018-09-01 | End: 2018-09-04 | Stop reason: HOSPADM

## 2018-09-01 RX ORDER — PROMETHAZINE HYDROCHLORIDE 25 MG/1
12.5 TABLET ORAL ONCE AS NEEDED
Status: DISCONTINUED | OUTPATIENT
Start: 2018-09-01 | End: 2018-09-01 | Stop reason: HOSPADM

## 2018-09-01 RX ORDER — FENTANYL CITRATE 50 UG/ML
INJECTION, SOLUTION INTRAMUSCULAR; INTRAVENOUS
Status: COMPLETED
Start: 2018-09-01 | End: 2018-09-01

## 2018-09-01 RX ORDER — CLINDAMYCIN PHOSPHATE 600 MG/50ML
600 INJECTION INTRAVENOUS EVERY 8 HOURS
Status: COMPLETED | OUTPATIENT
Start: 2018-09-01 | End: 2018-09-02

## 2018-09-01 RX ORDER — SUCCINYLCHOLINE CHLORIDE 20 MG/ML
INJECTION INTRAMUSCULAR; INTRAVENOUS AS NEEDED
Status: DISCONTINUED | OUTPATIENT
Start: 2018-09-01 | End: 2018-09-01 | Stop reason: SURG

## 2018-09-01 RX ORDER — PROPOFOL 10 MG/ML
VIAL (ML) INTRAVENOUS AS NEEDED
Status: DISCONTINUED | OUTPATIENT
Start: 2018-09-01 | End: 2018-09-01 | Stop reason: SURG

## 2018-09-01 RX ORDER — OXYCODONE AND ACETAMINOPHEN 7.5; 325 MG/1; MG/1
1 TABLET ORAL ONCE AS NEEDED
Status: DISCONTINUED | OUTPATIENT
Start: 2018-09-01 | End: 2018-09-01 | Stop reason: HOSPADM

## 2018-09-01 RX ORDER — CLINDAMYCIN PHOSPHATE 600 MG/50ML
600 INJECTION INTRAVENOUS EVERY 8 HOURS
Status: DISCONTINUED | OUTPATIENT
Start: 2018-09-01 | End: 2018-09-01

## 2018-09-01 RX ORDER — NALOXONE HCL 0.4 MG/ML
0.1 VIAL (ML) INJECTION
Status: DISCONTINUED | OUTPATIENT
Start: 2018-09-01 | End: 2018-09-04 | Stop reason: HOSPADM

## 2018-09-01 RX ORDER — FLUMAZENIL 0.1 MG/ML
0.2 INJECTION INTRAVENOUS AS NEEDED
Status: DISCONTINUED | OUTPATIENT
Start: 2018-09-01 | End: 2018-09-01 | Stop reason: HOSPADM

## 2018-09-01 RX ORDER — SODIUM CHLORIDE, SODIUM LACTATE, POTASSIUM CHLORIDE, CALCIUM CHLORIDE 600; 310; 30; 20 MG/100ML; MG/100ML; MG/100ML; MG/100ML
9 INJECTION, SOLUTION INTRAVENOUS CONTINUOUS
Status: DISCONTINUED | OUTPATIENT
Start: 2018-09-01 | End: 2018-09-01

## 2018-09-01 RX ORDER — LIDOCAINE HYDROCHLORIDE 40 MG/ML
SOLUTION TOPICAL
Status: DISCONTINUED
Start: 2018-09-01 | End: 2018-09-01 | Stop reason: WASHOUT

## 2018-09-01 RX ORDER — ROCURONIUM BROMIDE 10 MG/ML
INJECTION, SOLUTION INTRAVENOUS AS NEEDED
Status: DISCONTINUED | OUTPATIENT
Start: 2018-09-01 | End: 2018-09-01 | Stop reason: SURG

## 2018-09-01 RX ORDER — HYDROMORPHONE HYDROCHLORIDE 1 MG/ML
0.5 INJECTION, SOLUTION INTRAMUSCULAR; INTRAVENOUS; SUBCUTANEOUS
Status: DISCONTINUED | OUTPATIENT
Start: 2018-09-01 | End: 2018-09-04 | Stop reason: HOSPADM

## 2018-09-01 RX ORDER — DIPHENHYDRAMINE HYDROCHLORIDE 50 MG/ML
12.5 INJECTION INTRAMUSCULAR; INTRAVENOUS
Status: DISCONTINUED | OUTPATIENT
Start: 2018-09-01 | End: 2018-09-01 | Stop reason: HOSPADM

## 2018-09-01 RX ORDER — LABETALOL HYDROCHLORIDE 5 MG/ML
5 INJECTION, SOLUTION INTRAVENOUS
Status: DISCONTINUED | OUTPATIENT
Start: 2018-09-01 | End: 2018-09-01 | Stop reason: HOSPADM

## 2018-09-01 RX ORDER — ONDANSETRON 2 MG/ML
4 INJECTION INTRAMUSCULAR; INTRAVENOUS EVERY 6 HOURS PRN
Status: DISCONTINUED | OUTPATIENT
Start: 2018-09-01 | End: 2018-09-04 | Stop reason: HOSPADM

## 2018-09-01 RX ADMIN — MORPHINE SULFATE 4 MG: 4 INJECTION INTRAVENOUS at 06:47

## 2018-09-01 RX ADMIN — SODIUM CHLORIDE, POTASSIUM CHLORIDE, SODIUM LACTATE AND CALCIUM CHLORIDE 100 ML/HR: 600; 310; 30; 20 INJECTION, SOLUTION INTRAVENOUS at 15:55

## 2018-09-01 RX ADMIN — CLINDAMYCIN PHOSPHATE 600 MG: 600 INJECTION, SOLUTION INTRAVENOUS at 07:55

## 2018-09-01 RX ADMIN — SODIUM CHLORIDE 1000 ML: 9 INJECTION, SOLUTION INTRAVENOUS at 06:40

## 2018-09-01 RX ADMIN — SODIUM CITRATE AND CITRIC ACID MONOHYDRATE 30 ML: 500; 334 SOLUTION ORAL at 11:24

## 2018-09-01 RX ADMIN — FENTANYL CITRATE 100 MCG: 50 INJECTION INTRAMUSCULAR; INTRAVENOUS at 11:35

## 2018-09-01 RX ADMIN — CLINDAMYCIN PHOSPHATE 600 MG: 12 INJECTION, SOLUTION INTRAMUSCULAR; INTRAVENOUS at 15:58

## 2018-09-01 RX ADMIN — FAMOTIDINE 20 MG: 10 INJECTION, SOLUTION INTRAVENOUS at 10:38

## 2018-09-01 RX ADMIN — SUCCINYLCHOLINE CHLORIDE 140 MG: 20 INJECTION, SOLUTION INTRAMUSCULAR; INTRAVENOUS; PARENTERAL at 11:35

## 2018-09-01 RX ADMIN — SODIUM CHLORIDE, POTASSIUM CHLORIDE, SODIUM LACTATE AND CALCIUM CHLORIDE 100 ML/HR: 600; 310; 30; 20 INJECTION, SOLUTION INTRAVENOUS at 12:57

## 2018-09-01 RX ADMIN — ROCURONIUM BROMIDE 10 MG: 10 INJECTION INTRAVENOUS at 11:35

## 2018-09-01 RX ADMIN — PROPOFOL 200 MG: 10 INJECTION, EMULSION INTRAVENOUS at 11:35

## 2018-09-01 RX ADMIN — SODIUM CHLORIDE, POTASSIUM CHLORIDE, SODIUM LACTATE AND CALCIUM CHLORIDE: 600; 310; 30; 20 INJECTION, SOLUTION INTRAVENOUS at 11:15

## 2018-09-01 RX ADMIN — ONDANSETRON 4 MG: 2 INJECTION INTRAMUSCULAR; INTRAVENOUS at 06:47

## 2018-09-01 RX ADMIN — LIDOCAINE HYDROCHLORIDE 50 MG: 20 INJECTION, SOLUTION INFILTRATION; PERINEURAL at 11:35

## 2018-09-01 NOTE — ANESTHESIA PROCEDURE NOTES
Airway  Urgency: elective    Airway not difficult    General Information and Staff    Patient location during procedure: OR  Anesthesiologist: IAN CHOI  CRNA: ZOILA MARQUIS    Indications and Patient Condition  Indications for airway management: airway protection    Preoxygenated: yes  MILS not maintained throughout  Mask difficulty assessment: 1 - vent by mask    Final Airway Details  Final airway type: endotracheal airway      Successful airway: ETT  Cuffed: yes   Successful intubation technique: direct laryngoscopy  Facilitating devices/methods: cricoid pressure  Endotracheal tube insertion site: oral  Blade: Steve  Blade size: 3  ETT size: 7.0 mm  Cormack-Lehane Classification: grade I - full view of glottis  Placement verified by: chest auscultation and capnometry   Measured from: lips  ETT to lips (cm): 20  Number of attempts at approach: 1    Additional Comments  Dentition intact and unchanged. CBEBS.  +ETCO2. RSI

## 2018-09-01 NOTE — OP NOTE
September 1, 2018    Wanda Solis  [id number]    PROCEDURE: Mastotomy with drainage left breast abscess.    PREOPERATIVE DIAGNOSIS:  Left Breast abscess in pregnancy.    POSTOPERATIVE DIAGNOSIS: Same.    SURGEON:  Parvez Long M.D.    ASSISTANT:  None.    ANESTHESIA:  Gen. endotracheal local.    ESTIMATED BLOOD LOSS: 25 cc.    SPECIMENS:    Order Name Source Comment Collection Info Order Time   ANAEROBIC CULTURE Breast, Left  Collected By: Parvez Long MD 9/1/2018 11:44 AM   WOUND CULTURE Breast, Left  Collected By: Parvez Long MD 9/1/2018 11:44 AM       INDICATIONS:  Patient is a mildly overweight 28-year-old 17 week pregnant female came the ER with a multiple day history of gradually worsening increased pain swelling and redness of the medial left breast.  An abscess was identified she was admitted to obstetrics and surgery was consulted.  I recommended definitive drainage the operating room and she now presents.    PROCEDURE:  Patient brought the operating room and placed supine.  General endotracheal anesthesia was established.  Left breast was prepped and draped in normal sterile fashion.  Abscess was easily identifiable at about 4 o'clock position and purulent drainage was noted.  A mastotomy 3 x 2 cm was made over the dome of the abscess to eliminate any questionable skin and to enter the abscess cavity broadly.  20 cc of foul pus was evacuated.  Culture was obtained.  Wound was irrigated with 500 cc of saline.  The wound was packed with half-inch iodoform gauze.  Local anesthetic was injected.  Dressing was applied.  Anesthesia was reversed.  She'll be sent to recovery then to the floor and admitted to the obstetrics service.      Parvez Long M.D.

## 2018-09-01 NOTE — CONSULTS
"September 1, 2018    Wanda Solis  [id number]    GENERAL SURGERY CONSULTATION    CONSULTING PHYSICIAN:  Parvez Long M.D.    REQUESTING PHYSICIAN:  Amor Michele M.D.    PRIMARY PHYSICIAN:  Ronal Mcgee MD.    REASON FOR CONSULTATION:    Left breast mastitis with complex abscess.    HISTORY:  Wanda Solis is a 28 y.o. female who is admitted to the hospital with  significant mastitis of the left breast associated with a complicated abscess.  Patient is about 17 weeks into her third pregnancy and presented to her primary gynecologist a few days ago with pain and swelling in the medial and inferior left breast.  She's had \"boils before\" in this breast but nothing like this.  The pregnancy is been otherwise uneventful.  She was given a prescription for antibiotics but unfortunately it continues to get worse.  She presented to the ER for further evaluation.  She does complain of chills nausea and vomiting but has no documented fevers.  She denies any diabetes or history of excessive infection outside of what's mentioned above.  She denies any trauma to the breast.  She has no abdominal or gynecologic complaints at the present time.  She was seen and evaluated in the ER and admitted to obstetrics with surgical consultation planned.        Past Medical History:   Diagnosis Date   • Abnormal Pap smear of cervix    • Urogenital trichomoniasis      Past Surgical History:   Procedure Laterality Date   • DILATATION AND CURETTAGE     • WISDOM TOOTH EXTRACTION       Family History   Problem Relation Age of Onset   • Breast cancer Neg Hx    • Ovarian cancer Neg Hx    • Uterine cancer Neg Hx    • Colon cancer Neg Hx    • Deep vein thrombosis Neg Hx    • Pulmonary embolism Neg Hx      Social History   Substance Use Topics   • Smoking status: Never Smoker   • Smokeless tobacco: Never Used   • Alcohol use No       Review of Systems  On questioning, the patient denies any weight loss, or headache, no visual changes " "or hearing complaints, no new cardiovascular or pulmonary complaints, no  complaints, no musculoskeletal or neurologic complaints, no bleeding, psychiatric or endocrine complaints.    Vitals:    09/01/18 0703 09/01/18 0704 09/01/18 0715 09/01/18 0716   BP: 122/77      BP Location:       Patient Position:       Pulse:    99   Resp:    14   Temp:       TempSrc:       SpO2:  96% 95%    Weight:       Height:           Physical Exam  /77   Pulse 99   Temp 99.4 °F (37.4 °C) (Tympanic)   Resp 14   Ht 162.6 cm (64\")   Wt 131 kg (288 lb 8 oz)   LMP 03/28/2018 (Approximate)   SpO2 95%   BMI 49.52 kg/m²     On exam, patient is alert oriented and appropriate and is in no acute distress.  HEENT:   Head is normocephalic, both external ears are normal and sclerae are nonicteric.  Neck:  Supple without lymphadenopathy.  Heart:  Regular without obvious murmur.  Chest:  Good respiratory effort bilaterally.  Bilateral breast exam reveals a rather obvious complex erythematous indurated and fluctuant mass at about the 8 o'clock position in the left breast consistent with a complex abscess associated with mastitis.  Abdomen:  Soft, protuberant, obese, nontender, nondistended.  Rectal:  Deferred.  Extremities:  Without edema.  Skin:  Negative.    Labs:   Lab Results   Component Value Date    WBC 9.09 09/01/2018    HGB 9.1 (L) 09/01/2018    HCT 27.4 (L) 09/01/2018     09/01/2018     Lab Results   Component Value Date     (L) 09/01/2018    K 3.6 09/01/2018     09/01/2018    CO2 21.0 (L) 09/01/2018    BUN 3 (L) 09/01/2018    CREATININE 0.48 (L) 09/01/2018    GLUCOSE 97 09/01/2018       Assessment/Plan:  Patient is a 28 y.o. female who is admitted to the hospital with mastitis and a complex abscess of the left breast at 17 weeks pregnant.  Thankfully she shows no signs of obvious sepsis or toxicity.  Antibiotics have been started, she's been admitted to obstetrics and I will recommend surgical incision and " drainage of this rather large and complex abscess today under anesthesia.  The risks and benefits were discussed at the bedside including but not limited to bleeding, persistent or recurrent infection and complications related to the pregnancy.    We will proceed.    Thank you very much for the consult,     Parvze Long M.D.

## 2018-09-01 NOTE — H&P
"    Patient Care Team:  Ronal Mcgee MD as PCP - General    Chief complaint Left breast abscess    Subjective     Patient is a 28 y.o. female  at 17w5d that presents with two weeks of breast pain and a lump that had been diagnosed as mastitis.  She had been seen in ED and by her OB, Dr. Michele and was given prescription for keflex.  She had just started the keflex on Tuesday.  She reports the area has enlarged and has become more red and painful, despite being on the antibiotics.  It has also started to drain large amount of pus.  She denies any other complications with this pregnancy.  She did not breastfeed any of her other children.  She reports a history of \"boils\" on her left breast after the delivery of her last child and was discharged home after delivery on dicloxicillin.  She has been started on IV clindamycin in ED and General Surgery has been consulted and are taking her for I&D of abscess.    Review of Systems   Pertinent items are noted in HPI, all other systems reviewed and negative    History  Past Medical History:   Diagnosis Date   • Abnormal Pap smear of cervix    • Urogenital trichomoniasis      Past Surgical History:   Procedure Laterality Date   • DILATATION AND CURETTAGE     • WISDOM TOOTH EXTRACTION       Family History   Problem Relation Age of Onset   • Breast cancer Neg Hx    • Ovarian cancer Neg Hx    • Uterine cancer Neg Hx    • Colon cancer Neg Hx    • Deep vein thrombosis Neg Hx    • Pulmonary embolism Neg Hx      Social History   Substance Use Topics   • Smoking status: Never Smoker   • Smokeless tobacco: Never Used   • Alcohol use No     Prescriptions Prior to Admission   Medication Sig Dispense Refill Last Dose   • cephalexin (KEFLEX) 500 MG capsule Take 1 capsule by mouth 3 (Three) Times a Day for 10 days. 30 capsule 0 2018 at 1930     Allergies:  Patient has no known allergies.    Objective     Vital Signs  Temp:  [98.5 °F (36.9 °C)-99.4 °F (37.4 °C)] 98.5 °F " (36.9 °C)  Heart Rate:  [] 107  Resp:  [14-24] 24  BP: (111-140)/(77-86) 111/86    Physical Exam:    General Appearance: alert, appears stated age and cooperative  Lungs: clear to auscultation, respirations regular, respirations even and respirations unlabored  Heart: regular rhythm & normal rate  Breasts: Large abscess on left breast at approximately 7-8 o'clock.  Area measures approximately 12 cm x 8 cm with copious amount of foul smelling pus draining.    Abdomen: soft non-tender, no guarding and no rebound tenderness  Extremities: moves extremities well, no edema, no cyanosis and no redness    Results Review:    I reviewed the patient's new clinical results.    Assessment/Plan     Principal Problem:    Breast abscess during pregnancy, antepartum  Active Problems:    Mastitis    27 yo  at 17w5d with left breast abscess    1. Left breast abscess--patient planned for I&D today.  Received 1 dose of clindamycin in ED.  Will continue clindamycin after surgery and will change antibiotics if needed based on wound cultures.    2. Pregnancy--patient understands risks of surgery and general anesthesia in pregnancy.  Explained that the second trimester is the ideal time for surgery if needed.  Doptones documented in preop and will do doptones again in PACU.    I discussed the patients findings and my recommendations with patient.     Marzena Mcgrath MD  18  10:44 AM

## 2018-09-01 NOTE — ED TRIAGE NOTES
Pt states she has a wound on her left breast that is very painful. Pt also states she is 17 weeks pregnant. Pt states she started having nose bleeds yesterday morning off and on.

## 2018-09-01 NOTE — ANESTHESIA POSTPROCEDURE EVALUATION
"Patient: Wanda Solis    Procedure Summary     Date:  09/01/18 Room / Location:  [facility] OR 09 / [facility] MAIN OR    Anesthesia Start:  1131 Anesthesia Stop:  1204    Procedure:  INCISION AND DRAINAGE OF LEFT BREAST (Left Perirectal) Diagnosis:      Surgeon:  Parvez Long MD Provider:  Pretty Dye MD    Anesthesia Type:  general ASA Status:  2          Anesthesia Type: general  Last vitals  BP   128/68 (09/01/18 1201)   Temp   36.9 °C (98.4 °F) (09/01/18 1201)   Pulse   83 (09/01/18 1201)   Resp   18 (09/01/18 1201)     SpO2   100 % (09/01/18 1201)     Post Anesthesia Care and Evaluation    Patient location during evaluation: bedside  Patient participation: complete - patient participated  Level of consciousness: awake  Pain management: adequate  Airway patency: patent  Anesthetic complications: No anesthetic complications    Cardiovascular status: acceptable  Respiratory status: acceptable  Hydration status: acceptable    Comments: */68 (BP Location: Left arm, Patient Position: Lying)   Pulse 83   Temp 36.9 °C (98.4 °F) (Oral)   Resp 18   Ht 162.6 cm (64\")   Wt 131 kg (288 lb 8 oz)   LMP 03/28/2018 (Approximate)   SpO2 100%   BMI 49.52 kg/m²         "

## 2018-09-01 NOTE — PLAN OF CARE
Problem: Patient Care Overview  Goal: Plan of Care Review  Outcome: Ongoing (interventions implemented as appropriate)   09/01/18 1300 09/01/18 1845   Plan of Care Review   Progress --  improving   OTHER   Outcome Summary --  Pt eating regular diet. Denies discomfort. Receiving IV abx as ordered. ID to consult.   Coping/Psychosocial   Plan of Care Reviewed With patient --      Goal: Individualization and Mutuality  Outcome: Ongoing (interventions implemented as appropriate)   09/01/18 1845   Individualization   Patient Specific Preferences Shower when able   Patient Specific Goals (Include Timeframe) Discharge home within 48 hours   Patient Specific Interventions IV medications as ordered   Mutuality/Individual Preferences   What Anxieties, Fears, Concerns, or Questions Do You Have About Your Care? What caused abcess? Will it get better?   How Would You and/or Your Support Person Like to Participate in Your Care? Keep informed; allow at bedside prn   Mutuality/Individual Preferences   How to Address Anxieties/Fears Provide information     Goal: Discharge Needs Assessment  Outcome: Ongoing (interventions implemented as appropriate)   09/01/18 1845   Discharge Needs Assessment   Readmission Within the Last 30 Days no previous admission in last 30 days   Concerns to be Addressed no discharge needs identified   Patient/Family Anticipates Transition to home   Patient/Family Anticipated Services at Transition none   Transportation Concerns car, none   Transportation Anticipated car, drives self;family or friend will provide   Disability   Equipment Currently Used at Home none     Goal: Interprofessional Rounds/Family Conf  Outcome: Ongoing (interventions implemented as appropriate)   09/01/18 1845   Interdisciplinary Rounds/Family Conf   Participants nursing;physician;patient       Problem: Prenatal Patient, Hospitalized (Adult,Obstetrics,Pediatric)  Goal: Signs and Symptoms of Listed Potential Problems Will be Absent,  Minimized or Managed (Prenatal Patient, Hospitalized)  Outcome: Ongoing (interventions implemented as appropriate)   09/01/18 1845   Goal/Outcome Evaluation   Problems Assessed (Prenatal Patient) all   Problems Present (Prenatal Patient) none       Problem: Skin and Soft Tissue Infection (Adult)  Goal: Signs and Symptoms of Listed Potential Problems Will be Absent, Minimized or Managed (Skin and Soft Tissue Infection)  Outcome: Ongoing (interventions implemented as appropriate)   09/01/18 4995   Goal/Outcome Evaluation   Problems Assessed (Skin and Soft Tissue Infection) all   Problems Present (Skin/Soft Tissue Inf) infection progression

## 2018-09-01 NOTE — ANESTHESIA PREPROCEDURE EVALUATION
Anesthesia Evaluation     Patient summary reviewed and Nursing notes reviewed   no history of anesthetic complications:               Airway   Mallampati: II  TM distance: >3 FB  Neck ROM: full  Large neck circumference and Possible difficult intubation  Dental      Pulmonary - normal exam   Cardiovascular - normal exam        Neuro/Psych  GI/Hepatic/Renal/Endo    (+) morbid obesity,      Musculoskeletal     Abdominal    Substance History      OB/GYN    (+) Pregnant,         Other        ROS/Med Hx Other: 17 weeks pregnant                  Anesthesia Plan    ASA 2     general   Rapid sequence  intravenous induction   Anesthetic plan and risks discussed with patient.

## 2018-09-01 NOTE — ED PROVIDER NOTES
EMERGENCY DEPARTMENT ENCOUNTER    CHIEF COMPLAINT  Chief Complaint: Abscess  History given by:patient   History limited by:n/  Time Seen: 06:09  Room Number:   PMD: Ronal Mcgee MD      HPI:  Pt is a 28 y.o. female who presents for evaluation of an abscess to her left breast that has been developing over the past 6 days. Pt states that she was seen in the ED at the onset of sx, and was given an rx for Keflex, which she started 3 days ago. She also complains of chills, nausea, and vomiting. She denies abd pain or vaginal bleeding. , pt is currently 17 weeks pregnant.   Past Medical History- Seen in the ED on 18 and dx with cellulitis. Pt was given prescription for Keflex. At prenatal visit on 18 pt had not started the abx.     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   • Positive GBS test 2017   • Obesity affecting pregnancy in third trimester 2017   • Obesity in pregnancy, antepartum, third trimester 2017   • Postpartum care and examination immediately after delivery 12/15/2017   • Near syncope 2018     Resolved Ambulatory Problems     Diagnosis Date Noted   • No Resolved Ambulatory Problems     Past Medical History:   Diagnosis Date   • Abnormal Pap smear of cervix    • Urogenital trichomoniasis        PAST SURGICAL HISTORY  Past Surgical History:   Procedure Laterality Date   • DILATATION AND CURETTAGE     • WISDOM TOOTH EXTRACTION         FAMILY HISTORY  Family History   Problem Relation Age of Onset   • Breast cancer Neg Hx    • Ovarian cancer Neg Hx    • Uterine cancer Neg Hx     • Colon cancer Neg Hx    • Deep vein thrombosis Neg Hx    • Pulmonary embolism Neg Hx        SOCIAL HISTORY  Social History     Social History   • Marital status: Single     Spouse name: N/A   • Number of children: N/A   • Years of education: N/A     Occupational History   • Not on file.     Social History Main Topics   • Smoking status: Never Smoker   • Smokeless tobacco: Never Used   • Alcohol use No   • Drug use: No   • Sexual activity: Yes     Partners: Male     Birth control/ protection: None     Other Topics Concern   • Not on file     Social History Narrative   • No narrative on file         ALLERGIES  Patient has no known allergies.    REVIEW OF SYSTEMS  Review of Systems   Constitutional: Positive for chills. Negative for fever.   HENT: Negative for sore throat.    Respiratory: Negative for shortness of breath.    Cardiovascular: Negative for chest pain.   Gastrointestinal: Negative for nausea and vomiting.   Genitourinary: Negative for dysuria.   Musculoskeletal: Negative for back pain.   Skin: Positive for wound (abscess). Negative for rash.   Neurological: Negative for dizziness.   Psychiatric/Behavioral: The patient is not nervous/anxious.        PHYSICAL EXAM  ED Triage Vitals   Temp Heart Rate Resp BP SpO2   09/01/18 0601 09/01/18 0557 09/01/18 0557 09/01/18 0619 09/01/18 0557   99.4 °F (37.4 °C) (!) 125 17 130/83 99 %         Physical Exam   Constitutional: She is well-developed, well-nourished, and in no distress.   HENT:   Head: Normocephalic.   Mouth/Throat: Mucous membranes are normal.   Eyes: No scleral icterus.   Neck: Normal range of motion.   Cardiovascular: Normal rate, regular rhythm and normal heart sounds.    Pulmonary/Chest: Effort normal and breath sounds normal.       Abdominal: Soft. Normal appearance and bowel sounds are normal. There is no tenderness.   Musculoskeletal: Normal range of motion.   Neurological: She is alert.   Skin: Skin is warm and dry.   Psychiatric: Mood and  affect normal.   Nursing note and vitals reviewed.      LAB RESULTS  Recent Results (from the past 24 hour(s))   Comprehensive Metabolic Panel    Collection Time: 09/01/18  6:37 AM   Result Value Ref Range    Glucose 97 65 - 99 mg/dL    BUN 3 (L) 6 - 20 mg/dL    Creatinine 0.48 (L) 0.57 - 1.00 mg/dL    Sodium 135 (L) 136 - 145 mmol/L    Potassium 3.6 3.5 - 5.2 mmol/L    Chloride 104 98 - 107 mmol/L    CO2 21.0 (L) 22.0 - 29.0 mmol/L    Calcium 8.7 8.6 - 10.5 mg/dL    Total Protein 7.0 6.0 - 8.5 g/dL    Albumin 3.30 (L) 3.50 - 5.20 g/dL    ALT (SGPT) 11 1 - 33 U/L    AST (SGOT) 10 1 - 32 U/L    Alkaline Phosphatase 74 39 - 117 U/L    Total Bilirubin 0.3 0.1 - 1.2 mg/dL    eGFR  African Amer >150 >60 mL/min/1.73    Globulin 3.7 gm/dL    A/G Ratio 0.9 g/dL    BUN/Creatinine Ratio 6.3 (L) 7.0 - 25.0    Anion Gap 10.0 mmol/L   Lactic Acid, Plasma    Collection Time: 09/01/18  6:37 AM   Result Value Ref Range    Lactate 0.6 0.5 - 2.0 mmol/L   CBC Auto Differential    Collection Time: 09/01/18  6:37 AM   Result Value Ref Range    WBC 9.09 4.50 - 10.70 10*3/mm3    RBC 3.60 (L) 3.90 - 5.20 10*6/mm3    Hemoglobin 9.1 (L) 11.9 - 15.5 g/dL    Hematocrit 27.4 (L) 35.6 - 45.5 %    MCV 76.1 (L) 80.5 - 98.2 fL    MCH 25.3 (L) 26.9 - 32.0 pg    MCHC 33.2 32.4 - 36.3 g/dL    RDW 15.9 (H) 11.7 - 13.0 %    RDW-SD 44.4 37.0 - 54.0 fl    MPV 9.1 6.0 - 12.0 fL    Platelets 276 140 - 500 10*3/mm3    Neutrophil % 74.2 42.7 - 76.0 %    Lymphocyte % 15.8 (L) 19.6 - 45.3 %    Monocyte % 7.9 5.0 - 12.0 %    Eosinophil % 2.0 0.3 - 6.2 %    Basophil % 0.1 0.0 - 1.5 %    Immature Grans % 0.2 0.0 - 0.5 %    Neutrophils, Absolute 6.74 1.90 - 8.10 10*3/mm3    Lymphocytes, Absolute 1.44 0.90 - 4.80 10*3/mm3    Monocytes, Absolute 0.72 0.20 - 1.20 10*3/mm3    Eosinophils, Absolute 0.18 0.00 - 0.70 10*3/mm3    Basophils, Absolute 0.01 0.00 - 0.20 10*3/mm3    Immature Grans, Absolute 0.02 0.00 - 0.03 10*3/mm3       I ordered the above labs and reviewed  the results      MEDICAL RECORD REVIEW    Seen here on 8/27 for same complaint, was prescribed Keflex.  Also seen by Dr Michele on 8/29/18  Assessment     1) pregnancy at 17w2d   2) Left breast medial aspect with large boil and surrounding erythema and tenderness  Given Keflex at ER Monday (Just picked up today) 500 mg PO TID   Refer to breast surgeon to assess if needs drainage (if abscess)      START antibiotic ASAP   Call if not improved in next 48-72 hours   Call if febrile   Refer to surgery for possible I&D  Return in one week to verify good response to treatment   Use Keflex QID and call to make sure gets full 10 days   Return to review next week.   Off work already tomorrow   Call if need to extend         Plan     Reviewed this stage of pregnancy  Problem list updated   Follow up in 1 weeks     Amor Michele MD   8/29/2018  4:04 PM      PROCEDURES      PROGRESS AND CONSULTS      06:15  HR- (!) 125 Temp- 99.4 °F (37.4 °C) (Tympanic) O2 sat- 99%  Informed pt of the plan for labs and the likely need for admission for abx. ASSESSMENT:    0705: Reviewed pt's history and workup with Dr. Falk.  At bedside evaluation, they agree with the plan of care.    0725: See Dr Falk's note for consult and admission details.  ADMISSION    Discussed treatment plan and reason for admission with pt/family and admitting physician.  Pt/family voiced understanding of the plan for admission for further testing/treatment as needed.      DIAGNOSIS  Final diagnoses:   Mastitis of left breast unrelated to pregnancy or breastfeeding           COURSE & MEDICAL DECISION MAKING  Pertinent Labs that were ordered and reviewed are noted above.  Results were reviewed/discussed with the patient and they were also made aware of online assess.   Pt also made aware that some labs, such as cultures, will not be resulted during ER visit and follow up with PMD is necessary.     MEDICATIONS GIVEN IN ER  Medications   sodium chloride 0.9 % flush 10 mL  "(not administered)   clindamycin (CLEOCIN) 600 mg in dextrose 5% 50 mL IVPB (premix) (not administered)   sodium chloride 0.9 % bolus 1,000 mL (1,000 mL Intravenous New Bag 9/1/18 0640)   morphine injection 4 mg (4 mg Intravenous Given 9/1/18 0647)   ondansetron (ZOFRAN) injection 4 mg (4 mg Intravenous Given 9/1/18 0647)       /77   Pulse 99   Temp 99.4 °F (37.4 °C) (Tympanic)   Resp 14   Ht 162.6 cm (64\")   Wt 131 kg (288 lb 8 oz)   LMP 03/28/2018 (Approximate)   SpO2 95%   BMI 49.52 kg/m²       I personally reviewed the past medical history, past surgical history, social history, family history, current medications and allergies as they appear in this chart.  The scribe's note accurately reflects the work and decisions made by me.     Documentation assistance provided by bowen Mathur for MARIA T Gomez on 9/1/2018 at 6:18 AM. Information recorded by the scribe was done at my direction and has been verified and validated by me.     Brittney Mathur  09/01/18 0621       Lois Reyes APRN  09/01/18 0734    "

## 2018-09-01 NOTE — ED PROVIDER NOTES
The ENRIQUETA and I have discussed this patient's history, physical exam, and treatment plan. I have reviewed the documentation and personally had a face to face interaction with the patient  I affirm the documentation and agree with the treatment and plan.  The following describes my personal findings.    The patient presents at  at 17 weeks gestation complaining of L breast pain and abscess since 9 days ago. Pt was seen in ED for the same complaint at onset and was prescribed Keflex to which she started taking 3-4 days ago. Pt also c/o chills, but denies fever, any discharge from nipple, vaginal discharge, vaginal bleeding, constipation, and abd pain.    Limited physical exam:  Patient is nontoxic appearing  Lungs/cardiovascular: Lungs CTAB, heart is RRR  Abdomen: Soft, non tender, no guarding or rebound  There is a 12 cm by 8 cm area of induration to the medial aspect of large/pendulous L breast with several raised pustular areas with mild drainage and surrounding erythema that tracks over to the medial aspect of L nipple.    Discussed with pt at time of exam that pt will need to be admitted for treatment with antibiotics and further evaluation of her abscess by the surgeon. I have also informed pt that I have consulted with Dr. Zaman, who agrees with use of Clindamycin at 30 weeks pregnancy. Pt understands and agrees with the plan, all questions answered.    07 Discussed pt with FABIÁN GlaserPAULETTE, who states Clindamycin is ok for treatment of pt infection and pt will be admitted to Dr. Michele.    0737 Discussed pt with Dr. Long, who agrees to consult with pt.    Documentation assistance provided by bowen Ellsworth.  Information recorded by the bowen was done at my direction and has been verified and validated by me.     Dayna Ellsworth  [phone]       Jeanette Falk MD  [phone]

## 2018-09-02 LAB
ANION GAP SERPL CALCULATED.3IONS-SCNC: 10.2 MMOL/L
BASOPHILS # BLD AUTO: 0.01 10*3/MM3 (ref 0–0.2)
BASOPHILS NFR BLD AUTO: 0.2 % (ref 0–1.5)
BUN BLD-MCNC: 4 MG/DL (ref 6–20)
BUN/CREAT SERPL: 7.8 (ref 7–25)
CALCIUM SPEC-SCNC: 8.6 MG/DL (ref 8.6–10.5)
CHLORIDE SERPL-SCNC: 104 MMOL/L (ref 98–107)
CO2 SERPL-SCNC: 22.8 MMOL/L (ref 22–29)
CREAT BLD-MCNC: 0.51 MG/DL (ref 0.57–1)
DEPRECATED RDW RBC AUTO: 45.3 FL (ref 37–54)
EOSINOPHIL # BLD AUTO: 0.27 10*3/MM3 (ref 0–0.7)
EOSINOPHIL NFR BLD AUTO: 4.1 % (ref 0.3–6.2)
ERYTHROCYTE [DISTWIDTH] IN BLOOD BY AUTOMATED COUNT: 16 % (ref 11.7–13)
GFR SERPL CREATININE-BSD FRML MDRD: >150 ML/MIN/1.73
GLUCOSE BLD-MCNC: 95 MG/DL (ref 65–99)
HCT VFR BLD AUTO: 28 % (ref 35.6–45.5)
HGB BLD-MCNC: 9.6 G/DL (ref 11.9–15.5)
IMM GRANULOCYTES # BLD: 0.02 10*3/MM3 (ref 0–0.03)
IMM GRANULOCYTES NFR BLD: 0.3 % (ref 0–0.5)
LYMPHOCYTES # BLD AUTO: 1.54 10*3/MM3 (ref 0.9–4.8)
LYMPHOCYTES NFR BLD AUTO: 23.3 % (ref 19.6–45.3)
MCH RBC QN AUTO: 26.3 PG (ref 26.9–32)
MCHC RBC AUTO-ENTMCNC: 34.3 G/DL (ref 32.4–36.3)
MCV RBC AUTO: 76.7 FL (ref 80.5–98.2)
MONOCYTES # BLD AUTO: 0.38 10*3/MM3 (ref 0.2–1.2)
MONOCYTES NFR BLD AUTO: 5.7 % (ref 5–12)
NEUTROPHILS # BLD AUTO: 4.42 10*3/MM3 (ref 1.9–8.1)
NEUTROPHILS NFR BLD AUTO: 66.7 % (ref 42.7–76)
PLATELET # BLD AUTO: 272 10*3/MM3 (ref 140–500)
PMV BLD AUTO: 9.1 FL (ref 6–12)
POTASSIUM BLD-SCNC: 3.8 MMOL/L (ref 3.5–5.2)
RBC # BLD AUTO: 3.65 10*6/MM3 (ref 3.9–5.2)
SODIUM BLD-SCNC: 137 MMOL/L (ref 136–145)
WBC NRBC COR # BLD: 6.62 10*3/MM3 (ref 4.5–10.7)

## 2018-09-02 PROCEDURE — 25010000002 VANCOMYCIN 10 G RECONSTITUTED SOLUTION: Performed by: INTERNAL MEDICINE

## 2018-09-02 PROCEDURE — 99231 SBSQ HOSP IP/OBS SF/LOW 25: CPT | Performed by: OBSTETRICS & GYNECOLOGY

## 2018-09-02 PROCEDURE — 80048 BASIC METABOLIC PNL TOTAL CA: CPT | Performed by: OBSTETRICS & GYNECOLOGY

## 2018-09-02 PROCEDURE — 25010000002 PIPERACILLIN SOD-TAZOBACTAM PER 1 G: Performed by: INTERNAL MEDICINE

## 2018-09-02 PROCEDURE — 85025 COMPLETE CBC W/AUTO DIFF WBC: CPT | Performed by: OBSTETRICS & GYNECOLOGY

## 2018-09-02 RX ORDER — CLINDAMYCIN PHOSPHATE 600 MG/50ML
600 INJECTION INTRAVENOUS EVERY 8 HOURS
Status: DISCONTINUED | OUTPATIENT
Start: 2018-09-02 | End: 2018-09-02

## 2018-09-02 RX ADMIN — SODIUM CHLORIDE, POTASSIUM CHLORIDE, SODIUM LACTATE AND CALCIUM CHLORIDE 100 ML/HR: 600; 310; 30; 20 INJECTION, SOLUTION INTRAVENOUS at 23:08

## 2018-09-02 RX ADMIN — MICONAZOLE NITRATE 200 MG: 200 SUPPOSITORY VAGINAL at 21:39

## 2018-09-02 RX ADMIN — VANCOMYCIN HYDROCHLORIDE 1250 MG: 10 INJECTION, POWDER, LYOPHILIZED, FOR SOLUTION INTRAVENOUS at 17:07

## 2018-09-02 RX ADMIN — CLINDAMYCIN PHOSPHATE 600 MG: 600 INJECTION, SOLUTION INTRAVENOUS at 08:23

## 2018-09-02 RX ADMIN — VANCOMYCIN HYDROCHLORIDE 2000 MG: 10 INJECTION, POWDER, LYOPHILIZED, FOR SOLUTION INTRAVENOUS at 09:15

## 2018-09-02 RX ADMIN — CLINDAMYCIN PHOSPHATE 600 MG: 12 INJECTION, SOLUTION INTRAMUSCULAR; INTRAVENOUS at 00:25

## 2018-09-02 RX ADMIN — TAZOBACTAM SODIUM AND PIPERACILLIN SODIUM 3.38 G: 375; 3 INJECTION, SOLUTION INTRAVENOUS at 18:40

## 2018-09-02 NOTE — PROGRESS NOTES
Patient seen and evaluated. Discussed with patient and nursing staff and dr. Lewis. Agree with clindamycin initially.While awaiting the culture results start vancomycin  Detailed consult to follow.

## 2018-09-02 NOTE — CONSULTS
CONSULT NOTE    Infectious Diseases - Ene Pereira MD  [facility]       Patient Identification:  Name: Wanda Solis  Age: 28 y.o.  Sex: female  :  1990  MRN: [id number]             Date of Consultation:  2018        Primary Care Physician: Ronal Mcgee MD                               Requesting Physician: Dr. Michele  Reason for Consultation: Left chest wall/breast abscess    Impression: 28-year-old female who is in her 17th week of her pregnancy presents to weeks history of pain in her left breast and the lump that was diagnosis mastitis was managed outpatient setting by oral Keflex started on 2018.  Patient now presents to the emergency room early in the morning of 2018 with worsening pain and discomfort and swelling and redness of the area with spontaneous drainage of purulent material.  Patient had similar episodes after the delivery of her last child and was treated with oral antibiotics - dicloxacillin.  Patient has seen by general surgery service and has undergone I&D and packing.  This is consistent with:  1-left breast abscess recurrent but likely pathogen would include MRSA, strep as well as mixed gram-negative maged- status post I&D on 2018.  2-first trimester pregnancy and 17 week with obstetric history being   3- history of left breast abscesses  4-history of trichomoniasis      Recommendations/Discussions:  Juncture and initial choice of clindamycin is reasonable.  It is not unreasonable to extend her antibiotic spectrum to cover for MRSA and other pathogens while awaiting the culture results.  Would DC clindamycin and start her on vancomycin and Zosyn for at least next 48 hours until the final surgical culture data is available.  Thank you Dr. Michele/Dr. De Dios for letting me be the part of the patient care please see above impression and recommendations    History of Present Illness:   28-year-old female who is in her 17th week of her  pregnancy presents to weeks history of pain in her left breast and the lump that was diagnosis mastitis was managed outpatient setting by oral Keflex started on 8/28/2018.  Patient now presents to the emergency room early in the morning of 9/1/2018 with worsening pain and discomfort and swelling and redness of the area with spontaneous drainage of purulent material.  Patient had similar episodes after the delivery of her last child and was treated with oral antibiotics - dicloxacillin.  Patient has seen by general surgery service and has undergone I&D and packing.      Past Medical History:  Past Medical History:   Diagnosis Date   • Abnormal Pap smear of cervix    • Urogenital trichomoniasis      Past Surgical History:  Past Surgical History:   Procedure Laterality Date   • DILATATION AND CURETTAGE     • WISDOM TOOTH EXTRACTION        Home Meds:  Prescriptions Prior to Admission   Medication Sig Dispense Refill Last Dose   • cephalexin (KEFLEX) 500 MG capsule Take 1 capsule by mouth 3 (Three) Times a Day for 10 days. 30 capsule 0 8/31/2018 at 1930     Current Meds:     Current Facility-Administered Medications:   •  HYDROmorphone (DILAUDID) injection 0.5 mg, 0.5 mg, Intravenous, Q2H PRN **AND** naloxone (NARCAN) injection 0.1 mg, 0.1 mg, Intravenous, Q5 Min PRN, Parvez Long MD  •  lactated ringers infusion, 100 mL/hr, Intravenous, Continuous, Parvez Long MD, Last Rate: 100 mL/hr at 09/01/18 1555, 100 mL/hr at 09/01/18 1555  •  ondansetron (ZOFRAN) tablet 4 mg, 4 mg, Oral, Q6H PRN **OR** ondansetron ODT (ZOFRAN-ODT) disintegrating tablet 4 mg, 4 mg, Oral, Q6H PRN **OR** ondansetron (ZOFRAN) injection 4 mg, 4 mg, Intravenous, Q6H PRN, Parvez Long MD  •  oxyCODONE-acetaminophen (PERCOCET) 5-325 MG per tablet 1 tablet, 1 tablet, Oral, Q4H PRN, Parvez Long MD  •  oxyCODONE-acetaminophen (PERCOCET) 5-325 MG per tablet 2 tablet, 2 tablet, Oral, Q4H PRN, Parvez Long MD  •  Insert peripheral IV, , ,  "Once **AND** sodium chloride 0.9 % flush 10 mL, 10 mL, Intravenous, PRN, Lois Reyes, APRN  Allergies:  No Known Allergies  Social History:   Social History   Substance Use Topics   • Smoking status: Never Smoker   • Smokeless tobacco: Never Used   • Alcohol use No      Family History:  Family History   Problem Relation Age of Onset   • Breast cancer Neg Hx    • Ovarian cancer Neg Hx    • Uterine cancer Neg Hx    • Colon cancer Neg Hx    • Deep vein thrombosis Neg Hx    • Pulmonary embolism Neg Hx           Review of Systems  See history of present illness and past medical history.  Patient denies headache, dizziness, syncope, falls, trauma, change in vision, change in hearing, change in taste, changes in weight, changes in appetite, focal weakness, numbness, or paresthesia.  Patient denies chest pain, palpitations, dyspnea, orthopnea, PND, cough, sinus pressure, rhinorrhea, epistaxis, hemoptysis, nausea, vomiting,hematemesis, diarrhea, constipation or hematchezia.  Denies cold or heat intolerance, polydipsia, polyuria, polyphagia. Denies hematuria, pyuria, dysuria, hesitancy, frequency or urgency. Denies consumption of raw and under cooked meats foods or change in water source.  Denies fever, chills, sweats, night sweats.  Denies missing any routine medications. Remainder of ROS is negative.      Vitals:   /66 (BP Location: Left arm, Patient Position: Lying)   Pulse 96   Temp 98.9 °F (37.2 °C) (Oral)   Resp 18   Ht 162.6 cm (64\")   Wt 131 kg (288 lb 8 oz)   LMP 03/28/2018 (Approximate)   SpO2 98%   BMI 49.52 kg/m²   I/O:   Intake/Output Summary (Last 24 hours) at 09/02/18 0624  Last data filed at 09/02/18 0500   Gross per 24 hour   Intake          1094.41 ml   Output             1500 ml   Net          -405.59 ml     Exam:  General Appearance:    Alert, cooperative, no distress, appears stated age   Head:    Normocephalic, without obvious abnormality, atraumatic   Eyes:    PERRL, " conjunctiva/corneas clear, EOM's intact, both eyes   Ears:    Normal external ear canals, both ears   Nose:   Nares normal, septum midline, mucosa normal, no drainage    or sinus tenderness   Throat:   Lips, tongue, gums normal; oral mucosa pink and moist   Neck:   Supple, symmetrical, trachea midline, no adenopathy;     thyroid:  no enlargement/tenderness/nodules; no carotid    bruit or JVD   Back:     Symmetric, no curvature, ROM normal, no CVA tenderness   Lungs:     Clear to auscultation bilaterally, respirations unlabored   Chest Wall:    Left breast and chest wall surgical site is dressed and packed     Heart:    Regular rate and rhythm, S1 and S2 normal, no murmur, rub   or gallop   Abdomen:     Soft, non-tender, bowel sounds active all four quadrants,     no masses, no hepatomegaly, no splenomegaly   Extremities:   Extremities normal, atraumatic, no cyanosis or edema   Pulses:   Pulses palpable in all extremities; symmetric all extremities   Skin:   Skin color normal, Skin is warm and dry,  no rashes or palpable lesions   Neurologic:   CNII-XII intact, motor strength grossly intact, sensation grossly intact to light touch, no focal deficits noted       Data Review:    I reviewed the patient's new clinical results.    Results from last 7 days  Lab Units 09/01/18  0637   WBC 10*3/mm3 9.09   HEMOGLOBIN g/dL 9.1*   PLATELETS 10*3/mm3 276       Results from last 7 days  Lab Units 09/01/18  0637   SODIUM mmol/L 135*   POTASSIUM mmol/L 3.6   CHLORIDE mmol/L 104   CO2 mmol/L 21.0*   BUN mg/dL 3*   CREATININE mg/dL 0.48*   CALCIUM mg/dL 8.7   GLUCOSE mg/dL 97     Gram stain and culture data reviewed    Assessment:  Active Hospital Problems    Diagnosis Date Noted   • **Breast abscess during pregnancy, antepartum [O91.119] 09/01/2018   • Mastitis [N61.0] 09/01/2018      Resolved Hospital Problems    Diagnosis Date Noted Date Resolved   No resolved problems to display.         Plan:  See above  Ene Abernathy,  MD   9/2/2018  6:24 AM    Much of this encounter note is an electronic transcription/translation of spoken language to printed text. The electronic translation of spoken language may permit erroneous, or at times, nonsensical words or phrases to be inadvertently transcribed; Although I have reviewed the note for such errors, some may still exist

## 2018-09-02 NOTE — PROGRESS NOTES
"September 2, 2018  POD 1    Subjective:  Quiet evening.  Doing well.  No pain.    Objective:  Afebrile with low-grade fever yesterday noted.  /66 (BP Location: Left arm, Patient Position: Lying)   Pulse 92   Temp 99.1 °F (37.3 °C) (Oral)   Resp 18   Ht 162.6 cm (64\")   Wt 131 kg (288 lb 8 oz)   LMP 03/28/2018 (Approximate)   SpO2 98%   BMI 49.52 kg/m²   Left chest dressing intact.    Labs: WBC remains normal.    Diagnostic Studies: Culture pending.      PLAN:  Patient looks fine the morning after drainage of a large breast abscess.  I explained to her that from a surgical standpoint we can remove the packing tomorrow and she can probably do that at home.  I believe at this point she has to stay in the hospital only from infectious disease and/or obstetric standpoint.  Once the packing is out, the wound is open enough that no repacking or dressing changes will be needed.  I will defer final antibiotic recommendations to infectious disease.    Parvez Long M.D.    "

## 2018-09-02 NOTE — PROGRESS NOTES
Pharmacy consult to dose Zosyn    Day: 1  Consult For: Dr Abernathy  Treating: SSTI  Duration: 5 days    Wanda Solis is a 28 y.o. female 131 kg (288 lb 8 oz).     Anti-Infectives     Ordered     Dose/Rate Route Frequency Start Stop    09/02/18 1809  piperacillin-tazobactam (ZOSYN) 3.375 g in iso-osmotic dextrose 50 ml (premix)     Ordering Provider:  Ene Abernathy MD    3.375 g  over 4 Hours Intravenous Every 8 Hours 09/03/18 0100 09/08/18 0059    09/02/18 1809  piperacillin-tazobactam (ZOSYN) 3.375 g in iso-osmotic dextrose 50 ml (premix)     Ordering Provider:  Ene Abernathy MD    3.375 g  over 30 Minutes Intravenous Once 09/02/18 1900      09/02/18 1809  Pharmacy to Dose Zosyn     Ordering Provider:  Ene Abernathy MD     Does not apply Continuous PRN 09/02/18 1809 09/07/18 1808    09/02/18 0816  vancomycin 1250 mg/250 mL 0.9% NS IVPB ([facility])     Ordering Provider:  Ene Abernathy MD    15 mg/kg × 85.2 kg (Adjusted)  over 125 Minutes Intravenous Every 8 Hours 09/02/18 1700 09/09/18 1659    09/02/18 0806  vancomycin 2000 mg/500 mL 0.9% NS IVPB ([facility])     Ordering Provider:  Ene Abernathy MD    2,000 mg  over 200 Minutes Intravenous Once 09/02/18 0900 09/02/18 1345    09/02/18 0726  Pharmacy to dose vancomycin     Ordering Provider:  Ene Abernathy MD     Does not apply Continuous PRN 09/02/18 0726 09/09/18 0725    09/01/18 1254  clindamycin (CLEOCIN) 600 mg in dextrose 5% 50 mL IVPB (premix)     Ordering Provider:  Parvez Long MD    600 mg Intravenous Every 8 Hours 09/01/18 1600 09/02/18 0100    09/01/18 0729  clindamycin (CLEOCIN) 600 mg in dextrose 5% 50 mL IVPB (premix)     Ordering Provider:  Amor Michele MD    600 mg Intravenous Once 09/01/18 0731 09/01/18 0834           Estimated Creatinine Clearance: 216.9 mL/min (A) (by C-G formula based on SCr of 0.51 mg/dL (L)).  Creatinine   Date Value Ref Range Status   09/02/2018 0.51 (L) 0.57 - 1.00 mg/dL Final   09/01/2018 0.48 (L) 0.57 - 1.00 mg/dL Final      WBC   Date Value Ref Range Status   09/02/2018 6.62 4.50 - 10.70 10*3/mm3 Final   09/01/2018 9.09 4.50 - 10.70 10*3/mm3 Final     Temp Readings from Last 2 Encounters:   09/02/18 98.7 °F (37.1 °C) (Oral)   08/27/18 100.4 °F (38 °C)       Baseline cultures:  9/1 blood cx NGTD  9/1 wound cx 1/2 + proteus      PLAN:  Spoke with Dr Abernathy via phone and will start Zosyn 3.375gm Q8h with first dose stat.    Randa Santa PharmD, BCPS  09/02/18 6:10 PM

## 2018-09-02 NOTE — PLAN OF CARE
Problem: Patient Care Overview  Goal: Plan of Care Review  Outcome: Ongoing (interventions implemented as appropriate)   09/02/18 0626   OTHER   Outcome Summary Discussed anticipated care, Preliminary culture results   Coping/Psychosocial   Plan of Care Reviewed With patient     Goal: Individualization and Mutuality  Outcome: Ongoing (interventions implemented as appropriate)   09/01/18 1845 09/02/18 0626   Individualization   Patient Specific Preferences Shower when able --    Patient Specific Goals (Include Timeframe) --  Discharge home 09/03/2018   Patient Specific Interventions IV medications as ordered --    Mutuality/Individual Preferences   What Anxieties, Fears, Concerns, or Questions Do You Have About Your Care? What caused abcess? Will it get better? --    What Information Would Help Us Give You More Personalized Care? --  Keep updated with POC   How Would You and/or Your Support Person Like to Participate in Your Care? Keep informed; allow at bedside prn --    Mutuality/Individual Preferences   How to Address Anxieties/Fears --  Keep updated with POC     Goal: Discharge Needs Assessment  Outcome: Ongoing (interventions implemented as appropriate)   09/01/18 1845   Discharge Needs Assessment   Readmission Within the Last 30 Days no previous admission in last 30 days   Concerns to be Addressed no discharge needs identified   Patient/Family Anticipates Transition to home   Patient/Family Anticipated Services at Transition none   Transportation Concerns car, none   Transportation Anticipated car, drives self;family or friend will provide   Disability   Equipment Currently Used at Home none     Goal: Interprofessional Rounds/Family Conf  Outcome: Ongoing (interventions implemented as appropriate)   09/02/18 0626   Interdisciplinary Rounds/Family Conf   Summary Discussed anticipated care   Participants patient;nursing       Problem: Prenatal Patient, Hospitalized (Adult,Obstetrics,Pediatric)  Goal: Signs and  Symptoms of Listed Potential Problems Will be Absent, Minimized or Managed (Prenatal Patient, Hospitalized)  Outcome: Ongoing (interventions implemented as appropriate)   09/01/18 1845   Goal/Outcome Evaluation   Problems Assessed (Prenatal Patient) all   Problems Present (Prenatal Patient) none       Problem: Skin and Soft Tissue Infection (Adult)  Goal: Signs and Symptoms of Listed Potential Problems Will be Absent, Minimized or Managed (Skin and Soft Tissue Infection)   09/01/18 1845   Goal/Outcome Evaluation   Problems Assessed (Skin and Soft Tissue Infection) all   Problems Present (Skin/Soft Tissue Inf) infection progression

## 2018-09-02 NOTE — PROGRESS NOTES
"Pharmacokinetic Consult - Vancomycin Dosing (Initial Note)    Wanda Solis has been consulted for pharmacy to dose vancomycin for SSTI.  Pharmacy dosing vancomycin per Dr. Abernathy's request.   Goal trough: 15-20 mg/L   Other antimicrobials: clindamycin (failed outpatient therapy- Keflex)    Relevant clinical data and objective history reviewed:  28 y.o. female 162.6 cm (64\") 131 kg (288 lb 8 oz)    Past Medical History:   Diagnosis    • Abnormal Pap smear of cervix    • Urogenital trichomoniasis      Creatinine   Date Value Ref Range Status   09/02/2018 0.51 (L) 0.57 - 1.00 mg/dL Final   09/01/2018 0.48 (L) 0.57 - 1.00 mg/dL Final     BUN   Date Value Ref Range Status   09/02/2018 4 (L) 6 - 20 mg/dL Final     Estimated Creatinine Clearance: 216.9 mL/min (A) (by C-G formula based on SCr of 0.51 mg/dL (L)).    Lab Results   Component Value Date    WBC 6.62 09/02/2018     Vital Signs (last 24 hours)       09/01 0700  -  09/02 0659 09/02 0700  -  09/02 0823   Most Recent    Temp (°F) 98.4 -  99.4      99.1     99.1 (37.3)    Heart Rate 79 -  110      92     92    Resp 12 -  24      18     18    /64 -  140/78      105/66     105/66    SpO2 (%) 95 -  100      98     98        Baseline culture/source/susceptibility:   9/1: Wound anaerobic- pending  9/1: Wound- GPC and GNCoccobacilli, light growth proteus species  9/1: Wound no growth  9/1: Blood NGTDx2    Assessment/Plan  Reviewed chart.  Renal function is stable.    1. Started Vancomycin 2 gm iv x 1 dose (loading dose) followed by vancomycin 1.25 gm iv q8h  2. Vancomycin trough prior to 3rd dose of maintenance regimen to assess clearance (not at steady state).   3. Creatinine Q24H for the first 3 days then every 48 hours per dosing recommendations     Pharmacy will continue to follow daily while on vancomycin and adjust as needed.     Wanda Rolon, PharmD, BCPS  "

## 2018-09-02 NOTE — PROGRESS NOTES
"[facility]  Obstetric Progress Note      Subjective   History S/P I&D left breast abscess yesterday.  Patient:    The patient feels much better.  Appetite good.  Occas FM perceived  Objective     Vital signs in last 24 hours:    Vital Signs Range for the last 24 hours  Temperature: Temp:  [98.2 °F (36.8 °C)-99.4 °F (37.4 °C)] 98.2 °F (36.8 °C)   Temp Source: Temp src: Oral   BP: BP: (101-131)/(62-72) 110/72   Pulse: Heart Rate:  [] 97   Respirations: Resp:  [14-18] 16   SPO2: SpO2:  [98 %-100 %] 100 %   O2 Amount (l/min):     O2 Devices Device (Oxygen Therapy): room air   Weight:         Flowsheet Rows      First Filed Value   Admission Height  162.6 cm (64\") Documented at 09/01/2018 0557   Admission Weight  131 kg (288 lb 8 oz) Documented at 09/01/2018 0619          Intake/Output last 24 hours:      Intake/Output Summary (Last 24 hours) at 09/02/18 1225  Last data filed at 09/02/18 1018   Gross per 24 hour   Intake          2261.41 ml   Output             2250 ml   Net            11.41 ml       Intake/Output this shift:    I/O this shift:  In: -   Out: 600 [Urine:600]  Exam  Physical Exam:  General: Patient is comfortable, well appearing, in no acute distress and not in pain   Heart CVS exam: not examined.   Lungs Chest: not examined.     Abdomen Abdominal exam: soft, nontender, nondistended, no masses or organomegaly.   Extremities Exam of extremities: peripheral pulses normal, no pedal edema, no clubbing or cyanosis   Breast       Dreesing removed.  Packing in place with scant serous drainage.  No induration or erythema of surrounding tissues.  Wound redressed with light dressing.  Presentation:    Cervix: Exam by:     Dilation:     Effacement:     Station:       Fetal Heart Rate Assessment   Method: Fetal HR Assessment Method: intermittent auscultation, using Doppler   Beats/min: Fetal HR (beats/min): 141   Baseline:     Variability:     Accels:     Decels:     Tracing Category:       Uterine Assessment "   Method:     Frequency (min):     Ctx Count in 10 min:     Duration:     Intensity:     Intensity by IUPC:     Resting Tone:     Resting Tone by IUPC:     Joseph Units:       Assessment/Plan     Principal Problem:    Breast abscess during pregnancy, antepartum  Active Problems:    Mastitis    Assessment & Plan    Assessment:  1.  Intrauterine pregnancy at 17w6d weeks gestation with reassuring fetal status.    2.  S/P I&D left breast abscess  3.  Obstetrical history significant for is non-contributory.  4.  GBS status: No results found for: STREPGPB    Plan:  1. Await cultures for antibiotic management  2. Plan of care has been reviewed with patient   3.  Risks, benefits of treatment plan have been discussed.  4.  All questions have been answered.      Matt Zaman MD  9/2/2018  12:25 PM

## 2018-09-03 PROBLEM — O91.119 BREAST ABSCESS DURING PREGNANCY, ANTEPARTUM: Status: RESOLVED | Noted: 2018-09-01 | Resolved: 2018-09-03

## 2018-09-03 LAB
BACTERIA SPEC AEROBE CULT: ABNORMAL
BACTERIA SPEC AEROBE CULT: NORMAL
CREAT BLD-MCNC: 0.56 MG/DL (ref 0.57–1)
GFR SERPL CREATININE-BSD FRML MDRD: >150 ML/MIN/1.73
GRAM STN SPEC: ABNORMAL
GRAM STN SPEC: NORMAL
VANCOMYCIN TROUGH SERPL-MCNC: 12.3 MCG/ML (ref 5–20)

## 2018-09-03 PROCEDURE — 82565 ASSAY OF CREATININE: CPT | Performed by: INTERNAL MEDICINE

## 2018-09-03 PROCEDURE — 99232 SBSQ HOSP IP/OBS MODERATE 35: CPT | Performed by: OBSTETRICS & GYNECOLOGY

## 2018-09-03 PROCEDURE — 25010000002 PIPERACILLIN SOD-TAZOBACTAM PER 1 G: Performed by: INTERNAL MEDICINE

## 2018-09-03 PROCEDURE — 25010000002 HYDROMORPHONE PER 4 MG: Performed by: SURGERY

## 2018-09-03 PROCEDURE — 80202 ASSAY OF VANCOMYCIN: CPT | Performed by: INTERNAL MEDICINE

## 2018-09-03 PROCEDURE — 25010000002 VANCOMYCIN 10 G RECONSTITUTED SOLUTION: Performed by: INTERNAL MEDICINE

## 2018-09-03 RX ADMIN — MICONAZOLE NITRATE 200 MG: 200 SUPPOSITORY VAGINAL at 21:25

## 2018-09-03 RX ADMIN — TAZOBACTAM SODIUM AND PIPERACILLIN SODIUM 3.38 G: 375; 3 INJECTION, SOLUTION INTRAVENOUS at 17:32

## 2018-09-03 RX ADMIN — SODIUM CHLORIDE, POTASSIUM CHLORIDE, SODIUM LACTATE AND CALCIUM CHLORIDE 100 ML/HR: 600; 310; 30; 20 INJECTION, SOLUTION INTRAVENOUS at 13:33

## 2018-09-03 RX ADMIN — VANCOMYCIN HYDROCHLORIDE 1250 MG: 10 INJECTION, POWDER, LYOPHILIZED, FOR SOLUTION INTRAVENOUS at 09:23

## 2018-09-03 RX ADMIN — VANCOMYCIN HYDROCHLORIDE 1250 MG: 10 INJECTION, POWDER, LYOPHILIZED, FOR SOLUTION INTRAVENOUS at 01:32

## 2018-09-03 RX ADMIN — HYDROMORPHONE HYDROCHLORIDE 0.5 MG: 1 INJECTION, SOLUTION INTRAMUSCULAR; INTRAVENOUS; SUBCUTANEOUS at 09:00

## 2018-09-03 RX ADMIN — VANCOMYCIN HYDROCHLORIDE 1250 MG: 10 INJECTION, POWDER, LYOPHILIZED, FOR SOLUTION INTRAVENOUS at 17:32

## 2018-09-03 RX ADMIN — TAZOBACTAM SODIUM AND PIPERACILLIN SODIUM 3.38 G: 375; 3 INJECTION, SOLUTION INTRAVENOUS at 01:29

## 2018-09-03 RX ADMIN — TAZOBACTAM SODIUM AND PIPERACILLIN SODIUM 3.38 G: 375; 3 INJECTION, SOLUTION INTRAVENOUS at 09:02

## 2018-09-03 NOTE — PROGRESS NOTES
LOS: 2 days   Patient Care Team:  Ronal Mcgee MD as PCP - General    Chief Complaint:  Left breast abscess    Subjective     Interval History:     Patient feels much better after I&D.  Denies fevers or chills.  Pain is well controlled.  Reports vaginal itching and was started on miconazole.    Review of Systems:    All systems were reviewed and negative except for:  Genitourinary: postivie for  vaginal itching    Objective     Vital Signs  Temp:  [98.2 °F (36.8 °C)-99.4 °F (37.4 °C)] 98.8 °F (37.1 °C)  Heart Rate:  [] 92  Resp:  [16-18] 18  BP: (100-119)/(58-75) 118/75    Physical Exam:   General Appearance: alert, appears stated age and cooperative  Lungs: clear to auscultation, respirations regular, respirations even and respirations unlabored  Heart: regular rhythm & normal rate  Breasts: Bandage over left abscess.  Bandage is clean and dry.  Abdomen: soft non-tender, no guarding and no rebound tenderness  Extremities: moves extremities well, no edema, no cyanosis and no redness     Results Review:     I reviewed the patient's new clinical results.    Assessment/Plan     Active Problems:    Mastitis      27 yo  at 18w0d POD #2 s/p I&D of left breast abscess    1. Left breast abscess--patient is currently on IV Zosyn and vancomycin.  Culture is positive for Proteus.  Patient is stable for discharge home from Gen. surgery and obstetric standpoint.  Awaiting recommendations from infectious disease regarding antibiotic course.  If they are okay with patient being discharged home today on oral antibiotics, will discharge home.  Patient has an appointment on Wednesday with Dr. Michele was encouraged to keep that appointment.    2. Yeast infection--will send home with miconazole to complete a 3 day course    Marzena Mcgrath MD  18  9:52 AM

## 2018-09-03 NOTE — PROGRESS NOTES
"Pharmacokinetic Evaluation - Vancomycin and Zosyn    Wanda Solis is a 28 y.o. female on vancomycin and Zosyn pharmacy to dose.  MRN: [id number]  : 1990    Day of therapy: 2  Indication: mastitis and breast abscess  Consulted by: Dr. Abernathy  Goal trough: 15-20 mcg/ml    Current dose:   Vancomycin 1250mg IV q8h  Zosyn 3.375gm IV q8h    Blood pressure 118/75, pulse 92, temperature 98.8 °F (37.1 °C), temperature source Oral, resp. rate 18, height 162.6 cm (64\"), weight 131 kg (288 lb 8 oz), last menstrual period 2018, SpO2 98 %, not currently breastfeeding.    Results from last 7 days  Lab Units 18  0559 18  0713 18  0637   CREATININE mg/dL 0.56* 0.51* 0.48*     Estimated Creatinine Clearance: 197.6 mL/min (A) (by C-G formula based on SCr of 0.56 mg/dL (L)).    Results from last 7 days  Lab Units 18  0713 18  0637   WBC 10*3/mm3 6.62 9.09   HEMOGLOBIN g/dL 9.6* 9.1*   HEMATOCRIT % 28.0* 27.4*   PLATELETS 10*3/mm3 272 276     Cultures/ labs/ radiology:    blood cx x2: NGTD   wound cx light growth Proteus    Vancomycin dosing hx (include troughs if drawn):   0915: 2000mg IV x1, then 1250mg IV q8h  9/3 0922 trough: 12.3 mcg/ml (prior to 4th total dose, ~8hr post-dose)    Assessment:  Scr stable today, note plans for possible d/c today pending ID recommendations. Trough level this AM is below goal range, but not yet likely at steady state and expect further accumulation with continued dosing at q8h interval.     Plan:  1) Continue vancomycin 1250 mg every 8 hours.  2) Recheck trough in 2-3 days if remains on vancomycin.  3) Continue Zosyn 3.375gm IV q8h for now.  Encourage hydration as allowed by MD to prevent toxic accumulation. Monitor for signs and symptoms of toxicity or intolerance including rash, increase in Scr or decrease in UOP.   Pharmacy will continue to follow and adjust as needed.    Cha Linda [facility]    "

## 2018-09-03 NOTE — PLAN OF CARE
Problem: Patient Care Overview  Goal: Plan of Care Review  Outcome: Ongoing (interventions implemented as appropriate)   09/02/18 1855   Coping/Psychosocial   Plan of Care Reviewed With patient;significant other;family     Goal: Discharge Needs Assessment  Outcome: Ongoing (interventions implemented as appropriate)   09/02/18 1855   Discharge Needs Assessment   Readmission Within the Last 30 Days no previous admission in last 30 days   Concerns to be Addressed no discharge needs identified   Patient/Family Anticipates Transition to home with family   Patient/Family Anticipated Services at Transition none   Anticipated Changes Related to Illness none   Equipment Needed After Discharge none   Disability   Equipment Currently Used at Home none     Goal: Interprofessional Rounds/Family Conf  Outcome: Ongoing (interventions implemented as appropriate)   09/02/18 1855   Interdisciplinary Rounds/Family Conf   Participants family;patient;pharmacy;physician       Problem: Prenatal Patient, Hospitalized (Adult,Obstetrics,Pediatric)  Goal: Signs and Symptoms of Listed Potential Problems Will be Absent, Minimized or Managed (Prenatal Patient, Hospitalized)  Outcome: Ongoing (interventions implemented as appropriate)   09/02/18 1855   Goal/Outcome Evaluation   Problems Assessed (Prenatal Patient) all   Problems Present (Prenatal Patient) none       Problem: Skin and Soft Tissue Infection (Adult)  Goal: Signs and Symptoms of Listed Potential Problems Will be Absent, Minimized or Managed (Skin and Soft Tissue Infection)  Outcome: Ongoing (interventions implemented as appropriate)   09/02/18 2044   Goal/Outcome Evaluation   Problems Assessed (Skin and Soft Tissue Infection) all   Problems Present (Skin/Soft Tissue Inf) none

## 2018-09-03 NOTE — PLAN OF CARE
Problem: Patient Care Overview  Goal: Plan of Care Review  Outcome: Ongoing (interventions implemented as appropriate)   09/03/18 1845   Plan of Care Review   Progress improving   Coping/Psychosocial   Plan of Care Reviewed With patient;spouse     Goal: Individualization and Mutuality  Outcome: Ongoing (interventions implemented as appropriate)   09/03/18 1845   Individualization   Patient Specific Preferences wants to be DC home today, remain afebrile   Patient Specific Goals (Include Timeframe) no progression of infection, remain afebrile   Patient Specific Interventions IVF, IV antibiotics. SCD's, ambulate in halls   Mutuality/Individual Preferences   What Anxieties, Fears, Concerns, or Questions Do You Have About Your Care? DC home today.   What Information Would Help Us Give You More Personalized Care? none   How Would You and/or Your Support Person Like to Participate in Your Care? at bedside as needed   Mutuality/Individual Preferences   How to Address Anxieties/Fears Keep informed of POC     Goal: Discharge Needs Assessment  Outcome: Ongoing (interventions implemented as appropriate)   09/03/18 1845   Discharge Needs Assessment   Readmission Within the Last 30 Days no previous admission in last 30 days   Concerns to be Addressed no discharge needs identified   Patient/Family Anticipates Transition to home with family   Patient/Family Anticipated Services at Transition none   Transportation Anticipated car, drives self   Anticipated Changes Related to Illness none   Equipment Needed After Discharge none   Disability   Equipment Currently Used at Home none     Goal: Interprofessional Rounds/Family Conf  Outcome: Ongoing (interventions implemented as appropriate)   09/03/18 1845   Interdisciplinary Rounds/Family Conf   Participants family;nursing;patient;pharmacy;physician       Problem: Prenatal Patient, Hospitalized (Adult,Obstetrics,Pediatric)  Goal: Signs and Symptoms of Listed Potential Problems Will be  Absent, Minimized or Managed (Prenatal Patient, Hospitalized)  Outcome: Ongoing (interventions implemented as appropriate)   09/03/18 1845   Goal/Outcome Evaluation   Problems Assessed (Prenatal Patient) all   Problems Present (Prenatal Patient) none       Problem: Skin and Soft Tissue Infection (Adult)  Goal: Signs and Symptoms of Listed Potential Problems Will be Absent, Minimized or Managed (Skin and Soft Tissue Infection)  Outcome: Ongoing (interventions implemented as appropriate)   09/03/18 1845   Goal/Outcome Evaluation   Problems Assessed (Skin and Soft Tissue Infection) all   Problems Present (Skin/Soft Tissue Inf) none

## 2018-09-03 NOTE — PROGRESS NOTES
"September 3, 2018  POD 2     Subjective:  No complaints.    Objective:  Stable and afebrile.  /75 (BP Location: Left arm, Patient Position: Sitting)   Pulse 92   Temp 98.8 °F (37.1 °C) (Oral)   Resp 18   Ht 162.6 cm (64\")   Wt 131 kg (288 lb 8 oz)   LMP 03/28/2018 (Approximate)   SpO2 98%   BMI 49.52 kg/m²   Left breast dressing removed, packing removed and wound irrigated with peroxide.  Wound is very clean with healthy granulation base and no sign of continued drainage fluctuance or infection.    Labs: Reviewed.    Diagnostic Studies: Culture looks like Proteus sensitive to most antibiotics.      PLAN:  Left breast abscess status post drainage, positive for Proteus, improving nicely.  Packing is now out and I do not believe that further packing is needed.  I think the wound will heal secondarily on its own and I recommended she clean it daily in the shower and cover it with a clean dry dressing to manage the drainage and discharge.  I will defer antibiotic recommendations to infectious disease.  I will anticipate discharge home soon and I would like to see her in approximately 7-10 days in the office to assess wound healing.    Parvez Long M.D.    "

## 2018-09-03 NOTE — PLAN OF CARE
Problem: Patient Care Overview  Goal: Plan of Care Review  Outcome: Ongoing (interventions implemented as appropriate)   09/03/18 0636   Plan of Care Review   Progress improving   OTHER   Outcome Summary IVF and antibiotics given as ordered, no complaints of pain, rested well overnight   Coping/Psychosocial   Plan of Care Reviewed With patient     Goal: Individualization and Mutuality  Outcome: Ongoing (interventions implemented as appropriate)   09/03/18 0636   Individualization   Patient Specific Preferences To be discharged today   Patient Specific Goals (Include Timeframe) No signs of infection, remain afebrile   Patient Specific Interventions IVF and IV antibiotics, bilateral SCDs on   Mutuality/Individual Preferences   What Anxieties, Fears, Concerns, or Questions Do You Have About Your Care? Will I be able to go home soon.   What Information Would Help Us Give You More Personalized Care? none   How Would You and/or Your Support Person Like to Participate in Your Care? at bedside as requested   Mutuality/Individual Preferences   How to Address Anxieties/Fears Keep updated on plan of care     Goal: Discharge Needs Assessment  Outcome: Ongoing (interventions implemented as appropriate)   09/03/18 0636   Discharge Needs Assessment   Readmission Within the Last 30 Days no previous admission in last 30 days   Concerns to be Addressed no discharge needs identified   Patient/Family Anticipates Transition to home with family   Patient/Family Anticipated Services at Transition none   Transportation Concerns car, none   Transportation Anticipated car, drives self;family or friend will provide   Anticipated Changes Related to Illness none   Equipment Needed After Discharge none   Disability   Equipment Currently Used at Home none       Problem: Prenatal Patient, Hospitalized (Adult,Obstetrics,Pediatric)  Goal: Signs and Symptoms of Listed Potential Problems Will be Absent, Minimized or Managed (Prenatal Patient,  Hospitalized)  Outcome: Ongoing (interventions implemented as appropriate)   09/03/18 0636   Goal/Outcome Evaluation   Problems Assessed (Prenatal Patient) all   Problems Present (Prenatal Patient) none       Problem: Skin and Soft Tissue Infection (Adult)  Goal: Signs and Symptoms of Listed Potential Problems Will be Absent, Minimized or Managed (Skin and Soft Tissue Infection)  Outcome: Ongoing (interventions implemented as appropriate)   09/03/18 0636   Goal/Outcome Evaluation   Problems Assessed (Skin and Soft Tissue Infection) all   Problems Present (Skin/Soft Tissue Inf) none

## 2018-09-04 ENCOUNTER — TELEPHONE (OUTPATIENT)
Dept: OBSTETRICS AND GYNECOLOGY | Facility: CLINIC | Age: 28
End: 2018-09-04

## 2018-09-04 ENCOUNTER — TELEPHONE (OUTPATIENT)
Dept: SURGERY | Facility: CLINIC | Age: 28
End: 2018-09-04

## 2018-09-04 VITALS
SYSTOLIC BLOOD PRESSURE: 120 MMHG | HEART RATE: 100 BPM | HEIGHT: 64 IN | WEIGHT: 288.5 LBS | TEMPERATURE: 98.9 F | DIASTOLIC BLOOD PRESSURE: 60 MMHG | OXYGEN SATURATION: 100 % | RESPIRATION RATE: 18 BRPM | BODY MASS INDEX: 49.25 KG/M2

## 2018-09-04 PROCEDURE — 25010000002 PIPERACILLIN SOD-TAZOBACTAM PER 1 G: Performed by: INTERNAL MEDICINE

## 2018-09-04 PROCEDURE — 25010000002 VANCOMYCIN: Performed by: INTERNAL MEDICINE

## 2018-09-04 PROCEDURE — 99239 HOSP IP/OBS DSCHRG MGMT >30: CPT | Performed by: OBSTETRICS & GYNECOLOGY

## 2018-09-04 RX ORDER — AMOXICILLIN AND CLAVULANATE POTASSIUM 875; 125 MG/1; MG/1
1 TABLET, FILM COATED ORAL EVERY 12 HOURS
Qty: 20 TABLET | Refills: 0 | Status: SHIPPED | OUTPATIENT
Start: 2018-09-04 | End: 2018-09-14

## 2018-09-04 RX ADMIN — VANCOMYCIN HYDROCHLORIDE 1250 MG: 10 INJECTION, POWDER, LYOPHILIZED, FOR SOLUTION INTRAVENOUS at 01:05

## 2018-09-04 RX ADMIN — TAZOBACTAM SODIUM AND PIPERACILLIN SODIUM 3.38 G: 375; 3 INJECTION, SOLUTION INTRAVENOUS at 01:05

## 2018-09-04 RX ADMIN — SODIUM CHLORIDE, POTASSIUM CHLORIDE, SODIUM LACTATE AND CALCIUM CHLORIDE 100 ML/HR: 600; 310; 30; 20 INJECTION, SOLUTION INTRAVENOUS at 03:43

## 2018-09-04 NOTE — TELEPHONE ENCOUNTER
Called Dr. Michele's office. Pt referred to us but had surgery over the weekend. Ary said she would call back to see if Dr. Michele would like us to still see pt.    rr

## 2018-09-04 NOTE — PLAN OF CARE
Problem: Patient Care Overview  Goal: Plan of Care Review  Outcome: Outcome(s) achieved Date Met: 09/04/18 09/04/18 1005   OTHER   Outcome Summary patient is being discharged home, all goals met   Coping/Psychosocial   Plan of Care Reviewed With patient     Goal: Individualization and Mutuality  Outcome: Outcome(s) achieved Date Met: 09/04/18 09/04/18 1005   Individualization   Patient Specific Interventions patient being discharged home with oral antibiotics-augmentin, dressing changes       Problem: Prenatal Patient, Hospitalized (Adult,Obstetrics,Pediatric)  Goal: Signs and Symptoms of Listed Potential Problems Will be Absent, Minimized or Managed (Prenatal Patient, Hospitalized)  Outcome: Outcome(s) achieved Date Met: 09/04/18 09/04/18 1005   Goal/Outcome Evaluation   Problems Assessed (Prenatal Patient) all   Problems Present (Prenatal Patient) none       Problem: Skin and Soft Tissue Infection (Adult)  Goal: Signs and Symptoms of Listed Potential Problems Will be Absent, Minimized or Managed (Skin and Soft Tissue Infection)  Outcome: Outcome(s) achieved Date Met: 09/04/18 09/04/18 1005   Goal/Outcome Evaluation   Problems Assessed (Skin and Soft Tissue Infection) all   Problems Present (Skin/Soft Tissue Inf) none

## 2018-09-04 NOTE — PROGRESS NOTES
"  Infectious Diseases Progress Note    Ene Abernathy MD     [facility]  Los: 2 days  Patient Identification:  Name: Wanda Solis  Age: 28 y.o.  Sex: female  :  1990  MRN: [id number]         Primary Care Physician: Ronal Mcgee MD            Subjective: Feeling better and wants to go home.  Denies any fever and chills.  Tolerating antibiotics without any problem.  Interval History: See consultation note.    Objective:    Scheduled Meds:  miconazole 200 mg Vaginal Nightly   piperacillin-tazobactam 3.375 g Intravenous Q8H   vancomycin 15 mg/kg (Adjusted) Intravenous Q8H     Continuous Infusions:  lactated ringers 100 mL/hr Last Rate: 100 mL/hr (18)   Pharmacy to dose vancomycin     Pharmacy to Dose Zosyn         Vital signs in last 24 hours:  Temp:  [98.6 °F (37 °C)-99.2 °F (37.3 °C)] 99 °F (37.2 °C)  Heart Rate:  [] 96  Resp:  [16-18] 16  BP: (100-119)/(58-75) 106/68    Intake/Output:    Intake/Output Summary (Last 24 hours) at 18 2311  Last data filed at 18 2230   Gross per 24 hour   Intake           3719.9 ml   Output             1900 ml   Net           1819.9 ml       Exam:  /68 (BP Location: Left arm, Patient Position: Lying)   Pulse 96   Temp 99 °F (37.2 °C) (Oral)   Resp 16   Ht 162.6 cm (64\")   Wt 131 kg (288 lb 8 oz)   LMP 2018 (Approximate)   SpO2 99%   BMI 49.52 kg/m²     General Appearance:    Alert, cooperative, no distress, AAOx3                          Head:    Normocephalic, without obvious abnormality, atraumatic                           Eyes:    PERRL, conjunctiva/corneas clear, EOM's intact, both eyes                         Throat:   Lips, tongue, gums normal; oral mucosa pink and moist                           Neck:   Supple, symmetrical, trachea midline, no JVD                         Lungs:    Clear to auscultation bilaterally, respirations unlabored                 Chest Wall:    Left breast surgical site clean " decrease induration and tenderness.I&D wound base is clean with minimal drainage.                          Heart:    Regular rate and rhythm, S1 and S2 normal, no murmur                  Abdomen:     Soft, non-tender, bowel sounds active, no masses, gravid                 Extremities:   Extremities normal, atraumatic, no cyanosis or edema                        Pulses:   Pulses palpable in all extremities                            Skin:   Skin is warm and dry,  no rashes or palpable lesions         Data Review:    I reviewed the patient's new clinical results.    Results from last 7 days  Lab Units 09/02/18  0713 09/01/18  0637   WBC 10*3/mm3 6.62 9.09   HEMOGLOBIN g/dL 9.6* 9.1*   PLATELETS 10*3/mm3 272 276       Results from last 7 days  Lab Units 09/03/18  0559 09/02/18  0713 09/01/18  0637   SODIUM mmol/L  --  137 135*   POTASSIUM mmol/L  --  3.8 3.6   CHLORIDE mmol/L  --  104 104   CO2 mmol/L  --  22.8 21.0*   BUN mg/dL  --  4* 3*   CREATININE mg/dL 0.56* 0.51* 0.48*   CALCIUM mg/dL  --  8.6 8.7   GLUCOSE mg/dL  --  95 97     Microbiology Results (last 10 days)     Procedure Component Value - Date/Time    Wound Culture - Wound, Breast, Left [[id number]]  (Abnormal)  (Susceptibility) Collected:  09/01/18 1143    Lab Status:  Final result Specimen:  Wound from Breast, Left Updated:  09/03/18 0616     Wound Culture Light growth (2+) Proteus mirabilis (A)     Gram Stain Result Few (2+) Gram positive cocci      Few (2+) Gram negative coccobacilli      Rare (1+) WBCs seen    Susceptibility      Proteus mirabilis     MAGUE     Ampicillin <=2 ug/ml Susceptible     Ampicillin + Sulbactam <=2 ug/ml Susceptible     Cefazolin <=4 ug/ml Susceptible     Cefepime <=1 ug/ml Susceptible     Cefoxitin <=4 ug/ml Susceptible     Ceftriaxone <=1 ug/ml Susceptible     Ertapenem <=0.5 ug/ml Susceptible     Gentamicin <=1 ug/ml Susceptible     Levofloxacin 0.25 ug/ml Susceptible     Meropenem <=0.25 ug/ml Susceptible     Piperacillin +  Tazobactam <=4 ug/ml Susceptible     Trimethoprim + Sulfamethoxazole <=20 ug/ml Susceptible                    Wound Culture - Wound, Breast, Left [[id number]]  (Normal) Collected:  09/01/18 0716    Lab Status:  Final result Specimen:  Wound from Breast, Left Updated:  09/03/18 0649     Wound Culture No growth at 3 days     Gram Stain Result No WBCs or organisms seen    Blood Culture - Blood, [[id number]]  (Normal) Collected:  09/01/18 0637    Lab Status:  Preliminary result Specimen:  Blood from Arm, Left Updated:  09/03/18 0645     Blood Culture No growth at 2 days    Blood Culture - Blood, [[id number]]  (Normal) Collected:  09/01/18 0631    Lab Status:  Preliminary result Specimen:  Blood from Arm, Right Updated:  09/03/18 0645     Blood Culture No growth at 2 days            Assessment:  Active Problems:    Mastitis  Left breast abscess/chest wall abscess with mixed infection  First trimester pregnancy  History of recurrent abscesses    Plan:  Continue vancomycin and Zosyn.  If there is no evidence of MRSA that she could be discharged on oral Augmentin 875 twice a day for total of 10 days with close surgical follow-up.  Continue wound care as per surgery service.  Discussed with the patient.    Ene Abernathy MD  9/3/2018  11:11 PM    Much of this encounter note is an electronic transcription/translation of spoken language to printed text. The electronic translation of spoken language may permit erroneous, or at times, nonsensical words or phrases to be inadvertently transcribed; Although I have reviewed the note for such errors, some may still exist

## 2018-09-04 NOTE — PLAN OF CARE
Problem: Patient Care Overview  Goal: Interprofessional Rounds/Family Conf  Outcome: Ongoing (interventions implemented as appropriate)      Problem: Prenatal Patient, Hospitalized (Adult,Obstetrics,Pediatric)  Goal: Signs and Symptoms of Listed Potential Problems Will be Absent, Minimized or Managed (Prenatal Patient, Hospitalized)  Outcome: Ongoing (interventions implemented as appropriate)   09/04/18 0412   Goal/Outcome Evaluation   Problems Assessed (Prenatal Patient) all   Problems Present (Prenatal Patient) none       Problem: Skin and Soft Tissue Infection (Adult)  Goal: Signs and Symptoms of Listed Potential Problems Will be Absent, Minimized or Managed (Skin and Soft Tissue Infection)  Outcome: Ongoing (interventions implemented as appropriate)   09/04/18 0412   Goal/Outcome Evaluation   Problems Assessed (Skin and Soft Tissue Infection) all   Problems Present (Skin/Soft Tissue Inf) none

## 2018-09-04 NOTE — DISCHARGE SUMMARY
Date of Discharge:  9/4/2018    Discharge Diagnosis: Drainage of left breast abscess    Presenting Problem/History of Present Illness  Mastitis [N61.0]  Mastitis of left breast unrelated to pregnancy or breastfeeding [N61.0]     Hospital Course  Patient is a 28 y.o. female presented with a left breast abscess at 17 weeks gestation.  Patient was admitted and started on IV clindamycin and general surgery recommended incision and drainage of the abscess in the operating room.  Patient was taken for an incision and drainage by general surgery.  Please see operative note for further details on her surgery.  Infectious diseases was consulted postoperatively and changed her antibiotics to IV Zosyn and vancomycin.  Her culture returned positive for Proteus and on day of discharge, her anaerobic culture was still pending.  Infectious disease had signed off and recommended a 10 day course of Augmentin twice a day.  Patient was discharged home on postoperative day #3 in stable condition.  She was to follow-up with Dr. Michele tomorrow and with Dr. Long in 7-10 days.      Procedures Performed  Procedure(s):  INCISION AND DRAINAGE OF LEFT BREAST       Consults:   Consults     Date and Time Order Name Status Description    9/1/2018 1713 Inpatient Infectious Diseases Consult Completed     9/1/2018 0729 Surgery (on-call MD unless specified) Completed     9/1/2018 0711 OB/GYN (on-call MD unless specified) Completed           Condition on Discharge:  stable    Vital Signs  Temp:  [98.8 °F (37.1 °C)-99.1 °F (37.3 °C)] 98.9 °F (37.2 °C)  Heart Rate:  [] 100  Resp:  [16-18] 18  BP: (106-120)/(60-73) 120/60    Physical Exam:   see progress note    Discharge Disposition  Home or Self Care    Discharge Medications     Discharge Medications      New Medications      Instructions Start Date   amoxicillin-clavulanate 875-125 MG per tablet  Commonly known as:  AUGMENTIN   1 tablet, Oral, Every 12 Hours      miconazole 200 MG vaginal  suppository  Commonly known as:  MICOTIN   200 mg, Vaginal, Nightly         Stop These Medications    cephalexin 500 MG capsule  Commonly known as:  KEFLEX            Discharge Diet:     Activity at Discharge:     Follow-up Appointments  Future Appointments  Date Time Provider Department Center   9/5/2018 2:30 PM Amor Michele MD [facility] None   9/20/2018 1:40 PM ULTRASOUND LOBGYN [place] [facility] None   9/20/2018 2:15 PM Amor Michele MD [facility] None     [unfilled]    Test Results Pending at Discharge  [unfilled]     Marzena Mcgrath MD  09/04/18  1:42 PM    Time: Discharge >30 min

## 2018-09-04 NOTE — PROGRESS NOTES
"  Infectious Diseases Progress Note    Ene Abernathy MD     [facility]  Los: 3 days  Patient Identification:  Name: Wanda Solis  Age: 28 y.o.  Sex: female  :  1990  MRN: [id number]         Primary Care Physician: Ronal Mcgee MD            Subjective: Feeling better decreased pain once to go home.  Interval History: See consultation note.    Objective:    Scheduled Meds:    miconazole 200 mg Vaginal Nightly   piperacillin-tazobactam 3.375 g Intravenous Q8H   vancomycin 15 mg/kg (Adjusted) Intravenous Q8H     Continuous Infusions:    lactated ringers 100 mL/hr Last Rate: 100 mL/hr (18 0343)   Pharmacy to dose vancomycin     Pharmacy to Dose Zosyn         Vital signs in last 24 hours:  Temp:  [98.8 °F (37.1 °C)-99.2 °F (37.3 °C)] 98.8 °F (37.1 °C)  Heart Rate:  [] 96  Resp:  [16] 16  BP: (106-116)/(68-73) 106/68    Intake/Output:    Intake/Output Summary (Last 24 hours) at 18 1030  Last data filed at 18 0630   Gross per 24 hour   Intake           2661.9 ml   Output             2225 ml   Net            436.9 ml       Exam:  /68 (BP Location: Left arm, Patient Position: Lying)   Pulse 96   Temp 98.8 °F (37.1 °C) (Oral)   Resp 16   Ht 162.6 cm (64\")   Wt 131 kg (288 lb 8 oz)   LMP 2018 (Approximate)   SpO2 99%   BMI 49.52 kg/m²     General Appearance:    Alert, cooperative, no distress, AAOx3                          Head:    Normocephalic, without obvious abnormality, atraumatic                           Eyes:    PERRL, conjunctiva/corneas clear, EOM's intact, both eyes                         Throat:   Lips, tongue, gums normal; oral mucosa pink and moist                           Neck:   Supple, symmetrical, trachea midline, no JVD                         Lungs:    Clear to auscultation bilaterally, respirations unlabored                 Chest Wall:    Left breast surgical site clean decrease induration and tenderness.I&D wound base is clean " with minimal drainage.                          Heart:    Regular rate and rhythm, S1 and S2 normal, no murmur                  Abdomen:     Soft, non-tender, bowel sounds active, no masses, gravid                 Extremities:   Extremities normal, atraumatic, no cyanosis or edema                        Pulses:   Pulses palpable in all extremities                            Skin:   Skin is warm and dry,  no rashes or palpable lesions         Data Review:    I reviewed the patient's new clinical results.    Results from last 7 days  Lab Units 09/02/18  0713 09/01/18  0637   WBC 10*3/mm3 6.62 9.09   HEMOGLOBIN g/dL 9.6* 9.1*   PLATELETS 10*3/mm3 272 276       Results from last 7 days  Lab Units 09/03/18  0559 09/02/18  0713 09/01/18  0637   SODIUM mmol/L  --  137 135*   POTASSIUM mmol/L  --  3.8 3.6   CHLORIDE mmol/L  --  104 104   CO2 mmol/L  --  22.8 21.0*   BUN mg/dL  --  4* 3*   CREATININE mg/dL 0.56* 0.51* 0.48*   CALCIUM mg/dL  --  8.6 8.7   GLUCOSE mg/dL  --  95 97     Microbiology Results (last 10 days)     Procedure Component Value - Date/Time    Anaerobic Culture - Wound, Breast, Left [[id number]]  (Normal) Collected:  09/01/18 1143    Lab Status:  Preliminary result Specimen:  Wound from Breast, Left Updated:  09/04/18 1016     Culture Screening for Anaerobes    Wound Culture - Wound, Breast, Left [[id number]]  (Abnormal)  (Susceptibility) Collected:  09/01/18 1143    Lab Status:  Final result Specimen:  Wound from Breast, Left Updated:  09/03/18 0616     Wound Culture Light growth (2+) Proteus mirabilis (A)     Gram Stain Result Few (2+) Gram positive cocci      Few (2+) Gram negative coccobacilli      Rare (1+) WBCs seen    Susceptibility      Proteus mirabilis     MAGUE     Ampicillin <=2 ug/ml Susceptible     Ampicillin + Sulbactam <=2 ug/ml Susceptible     Cefazolin <=4 ug/ml Susceptible     Cefepime <=1 ug/ml Susceptible     Cefoxitin <=4 ug/ml Susceptible     Ceftriaxone <=1 ug/ml Susceptible      Ertapenem <=0.5 ug/ml Susceptible     Gentamicin <=1 ug/ml Susceptible     Levofloxacin 0.25 ug/ml Susceptible     Meropenem <=0.25 ug/ml Susceptible     Piperacillin + Tazobactam <=4 ug/ml Susceptible     Trimethoprim + Sulfamethoxazole <=20 ug/ml Susceptible                    Wound Culture - Wound, Breast, Left [[id number]]  (Normal) Collected:  09/01/18 0716    Lab Status:  Final result Specimen:  Wound from Breast, Left Updated:  09/03/18 0649     Wound Culture No growth at 3 days     Gram Stain Result No WBCs or organisms seen    Blood Culture - Blood, [[id number]]  (Normal) Collected:  09/01/18 0637    Lab Status:  Preliminary result Specimen:  Blood from Arm, Left Updated:  09/04/18 0645     Blood Culture No growth at 3 days    Blood Culture - Blood, [[id number]]  (Normal) Collected:  09/01/18 0631    Lab Status:  Preliminary result Specimen:  Blood from Arm, Right Updated:  09/04/18 0645     Blood Culture No growth at 3 days            Assessment:  Active Problems:    Mastitis  Left breast abscess/chest wall abscess with mixed infection  First trimester pregnancy  History of recurrent abscesses    Plan:  Okay to discharge on oral Augmentin 875 twice a day for total of 10 days with close surgical follow-up.  Continue wound care as per surgery service.  Discussed with the patient.    Ene Abernathy MD  9/4/2018  10:30 AM    Much of this encounter note is an electronic transcription/translation of spoken language to printed text. The electronic translation of spoken language may permit erroneous, or at times, nonsensical words or phrases to be inadvertently transcribed; Although I have reviewed the note for such errors, some may still exist

## 2018-09-04 NOTE — PROGRESS NOTES
LOS: 3 days   Patient Care Team:  Ronal Mcgee MD as PCP - General    Chief Complaint:  Left breast abscess    Subjective     Interval History:     Patient wanting to go home.  Feels well.  Frustrated that she wasn't seen by ID until 11 pm last night.  Denies fevers or chills.    Review of Systems:    All systems were reviewed and negative except for:  Genitourinary: postivie for  vaginal itching    Objective     Vital Signs  Temp:  [98.8 °F (37.1 °C)-99.2 °F (37.3 °C)] 98.8 °F (37.1 °C)  Heart Rate:  [] 96  Resp:  [16] 16  BP: (106-116)/(68-73) 106/68    Physical Exam:   General Appearance: alert, appears stated age and cooperative  Lungs: clear to auscultation, respirations regular, respirations even and respirations unlabored  Heart: regular rhythm & normal rate  Breasts: Bandage over left abscess.  Bandage is clean and dry.  Abdomen: soft non-tender, no guarding and no rebound tenderness  Extremities: moves extremities well, no edema, no cyanosis and no redness     Results Review:     I reviewed the patient's new clinical results.    Assessment/Plan     Active Problems:    Mastitis      29 yo  at 18w1d POD #3 s/p I&D of left breast abscess    1. Left breast abscess--will d/c home on augmentin 875 mg bid for 10 days.  Anaerobic culture is still pending, so this will need to be followed up as an outpatient.  ID saw patient this morning per patient and nurse and signed off.  No note in Epic from them.  Continue wound care per Dr. Long.    2. Yeast infection--will send home with miconazole to complete a 3 day course    Marzena Mcgrath MD  18  9:40 AM

## 2018-09-04 NOTE — PROGRESS NOTES
Date of Discharge:  9/4/2018    Discharge Diagnosis: Drainage of left breast abscess    Presenting Problem/History of Present Illness  Mastitis [N61.0]  Mastitis of left breast unrelated to pregnancy or breastfeeding [N61.0]     Hospital Course  Patient is a 28 y.o. female presented with a left breast abscess at 17 weeks gestation.  Patient was admitted and started on IV clindamycin and general surgery recommended incision and drainage of the abscess in the operating room.  Patient was taken for an incision and drainage by general surgery.  Please see operative note for further details on her surgery.  Infectious diseases was consulted postoperatively and changed her antibiotics to IV Zosyn and vancomycin.  Her culture returned positive for Proteus and on day of discharge, her anaerobic culture was still pending.  Infectious disease had signed off and recommended a 10 day course of Augmentin twice a day.  Patient was discharged home on postoperative day #3 in stable condition.  She was to follow-up with Dr. Michele tomorrow and with Dr. Long in 7-10 days.      Procedures Performed  Procedure(s):  INCISION AND DRAINAGE OF LEFT BREAST       Consults:   Consults     Date and Time Order Name Status Description    9/1/2018 1713 Inpatient Infectious Diseases Consult Completed     9/1/2018 0729 Surgery (on-call MD unless specified) Completed     9/1/2018 0711 OB/GYN (on-call MD unless specified) Completed           Condition on Discharge:  stable    Vital Signs  Temp:  [98.8 °F (37.1 °C)-99.2 °F (37.3 °C)] 98.8 °F (37.1 °C)  Heart Rate:  [] 96  Resp:  [16] 16  BP: (106-116)/(68-73) 106/68    Physical Exam:   see progress note    Discharge Disposition  Home or Self Care    Discharge Medications     Discharge Medications      New Medications      Instructions Start Date   amoxicillin-clavulanate 875-125 MG per tablet  Commonly known as:  AUGMENTIN   1 tablet, Oral, Every 12 Hours      miconazole 200 MG vaginal  suppository  Commonly known as:  MICOTIN   200 mg, Vaginal, Nightly         Stop These Medications    cephalexin 500 MG capsule  Commonly known as:  KEFLEX            Discharge Diet:     Activity at Discharge:     Follow-up Appointments  Future Appointments  Date Time Provider Department Center   9/5/2018 2:30 PM Amor Michele MD [facility] None   9/20/2018 1:40 PM ULTRASOUND LOBGYN [place] [facility] None   9/20/2018 2:15 PM Amor Michele MD [facility] None     [unfilled]    Test Results Pending at Discharge  [unfilled]     Marzena Mcgrath MD  09/04/18  9:43 AM    Time: Discharge >30 min

## 2018-09-04 NOTE — TELEPHONE ENCOUNTER
Omid    Yes, have them cancel the referral. She was addressed this weekend in the hospital.     Thanks   Dr. Michele    ----- Message from Omid Ceron sent at 9/4/2018 10:49 AM EDT -----  This patient was referred to Dr. Hillman office for Mastitis of the left breast. Dr. Hillman office called because they saw she had surgery on 9/1 and wants to know if you still want her to see Dr. Hazel. Please advise.    Thanks,  Omid Vasques from Dr. Hillman office-  [phone]

## 2018-09-05 ENCOUNTER — ROUTINE PRENATAL (OUTPATIENT)
Dept: OBSTETRICS AND GYNECOLOGY | Facility: CLINIC | Age: 28
End: 2018-09-05

## 2018-09-05 VITALS — BODY MASS INDEX: 47.38 KG/M2 | WEIGHT: 276 LBS | DIASTOLIC BLOOD PRESSURE: 77 MMHG | SYSTOLIC BLOOD PRESSURE: 120 MMHG

## 2018-09-05 DIAGNOSIS — Z34.82 ENCOUNTER FOR SUPERVISION OF OTHER NORMAL PREGNANCY IN SECOND TRIMESTER: Primary | ICD-10-CM

## 2018-09-05 DIAGNOSIS — O91.112: ICD-10-CM

## 2018-09-05 DIAGNOSIS — O91.212: ICD-10-CM

## 2018-09-05 PROCEDURE — 0502F SUBSEQUENT PRENATAL CARE: CPT | Performed by: OBSTETRICS & GYNECOLOGY

## 2018-09-05 NOTE — PROGRESS NOTES
OB follow up     Wanda Solis is a 28 y.o.  18w2d being seen today for her obstetrical visit.  Patient reports recent hospital stay for abcess on L breast. . Fetal movement: normal.    Review of Systems  No bleeding, No cramping/contractions     /77   Wt 125 kg (276 lb)   LMP 2018 (Approximate)   BMI 47.38 kg/m²     FHT: 148 BPM   Uterine Size: 18 cm   I&D site, good pink granulation tissue.   Mild induration, no significant erythema or drainage     Assessment    1) pregnancy at 18w2d   2) S/P drainage of breast abscess   Doing better as outpatient.   Anaerobic culture still pending but all else the same as at time of discharge.   On Augmentin times 10 days.   Call if anything changes or starts to not heal as well.   Keep ultrasound/anatomy appointment.         Plan    Reviewed this stage of pregnancy  Problem list updated   Follow up in 2 weeks    Amor Michele MD   2018  2:53 PM

## 2018-09-06 LAB
BACTERIA SPEC AEROBE CULT: NORMAL
BACTERIA SPEC AEROBE CULT: NORMAL
BACTERIA SPEC ANAEROBE CULT: ABNORMAL
BACTERIA SPEC ANAEROBE CULT: ABNORMAL

## 2018-09-20 ENCOUNTER — PROCEDURE VISIT (OUTPATIENT)
Dept: OBSTETRICS AND GYNECOLOGY | Facility: CLINIC | Age: 28
End: 2018-09-20

## 2018-09-20 ENCOUNTER — ROUTINE PRENATAL (OUTPATIENT)
Dept: OBSTETRICS AND GYNECOLOGY | Facility: CLINIC | Age: 28
End: 2018-09-20

## 2018-09-20 VITALS — BODY MASS INDEX: 47.72 KG/M2 | DIASTOLIC BLOOD PRESSURE: 85 MMHG | SYSTOLIC BLOOD PRESSURE: 122 MMHG | WEIGHT: 278 LBS

## 2018-09-20 DIAGNOSIS — O91.112: ICD-10-CM

## 2018-09-20 DIAGNOSIS — Z34.82 ENCOUNTER FOR SUPERVISION OF OTHER NORMAL PREGNANCY IN SECOND TRIMESTER: Primary | ICD-10-CM

## 2018-09-20 DIAGNOSIS — Z36.89 ENCOUNTER FOR FETAL ANATOMIC SURVEY: Primary | ICD-10-CM

## 2018-09-20 DIAGNOSIS — O99.212 OBESITY AFFECTING PREGNANCY IN SECOND TRIMESTER: ICD-10-CM

## 2018-09-20 PROCEDURE — 0502F SUBSEQUENT PRENATAL CARE: CPT | Performed by: OBSTETRICS & GYNECOLOGY

## 2018-09-20 PROCEDURE — 76805 OB US >/= 14 WKS SNGL FETUS: CPT | Performed by: OBSTETRICS & GYNECOLOGY

## 2018-09-20 NOTE — PROGRESS NOTES
OB follow up     Wanda Solis is a 28 y.o.  20w3d being seen today for her obstetrical visit.  Patient reports no complaints. Fetal movement: normal.  Pt stopped antibiotic for breast abscess due to itching and rash.     Review of Systems  No bleeding, No cramping/contractions     /85   Wt 126 kg (278 lb)   LMP 2018 (Approximate)   BMI 47.72 kg/m²     FHT: 144 BPM   Uterine Size: 20 cm       Assessment    1) pregnancy at 20w3d   Anatomy scan reviewed.   Variable position  Normal anatomy   No previa   Consistent with dates.   2) Breast abscess   Healing well  Inspected site, no residual infection seen   Ok to just allow to heal on own for now, report any issues  3) Obesity   Stable weight       Plan    Reviewed this stage of pregnancy  Problem list updated   Follow up in 4 weeks    Amor Michele MD   2018  2:13 PM

## 2018-10-19 ENCOUNTER — ROUTINE PRENATAL (OUTPATIENT)
Dept: OBSTETRICS AND GYNECOLOGY | Facility: CLINIC | Age: 28
End: 2018-10-19

## 2018-10-19 VITALS — SYSTOLIC BLOOD PRESSURE: 122 MMHG | BODY MASS INDEX: 48.71 KG/M2 | WEIGHT: 283.8 LBS | DIASTOLIC BLOOD PRESSURE: 82 MMHG

## 2018-10-19 DIAGNOSIS — Z36.9 ANTENATAL SCREENING ENCOUNTER: ICD-10-CM

## 2018-10-19 DIAGNOSIS — O99.212 OBESITY AFFECTING PREGNANCY IN SECOND TRIMESTER: ICD-10-CM

## 2018-10-19 DIAGNOSIS — Z34.82 ENCOUNTER FOR SUPERVISION OF OTHER NORMAL PREGNANCY IN SECOND TRIMESTER: Primary | ICD-10-CM

## 2018-10-19 DIAGNOSIS — O91.112: ICD-10-CM

## 2018-10-19 PROCEDURE — 0502F SUBSEQUENT PRENATAL CARE: CPT | Performed by: OBSTETRICS & GYNECOLOGY

## 2018-10-19 RX ORDER — CLINDAMYCIN HYDROCHLORIDE 300 MG/1
300 CAPSULE ORAL 3 TIMES DAILY
Qty: 30 CAPSULE | Refills: 0 | Status: SHIPPED | OUTPATIENT
Start: 2018-10-19 | End: 2018-10-29

## 2018-10-19 NOTE — PROGRESS NOTES
OB follow up     Wanda Solis is a 28 y.o.  24w4d being seen today for her obstetrical visit.  Patient reports no complaints. Fetal movement: normal.    Review of Systems  No bleeding, No cramping/contractions     /82   Wt 129 kg (283 lb 12.8 oz)   LMP 2018 (Approximate)   BMI 48.71 kg/m²     FHT: 138 BPM   Uterine Size: 26 cm       Assessment    1) pregnancy at 24w4d   Rh+, Need BS next visit   2) Breast abscess healing on left breast, new area of induration and drainage on right   Will treat with clindamycin   3) obesity in pregnancy         Plan    Reviewed this stage of pregnancy  Problem list updated   Follow up in 2 weeks    Amor Michele MD   10/19/2018  9:50 AM

## 2018-11-01 ENCOUNTER — ROUTINE PRENATAL (OUTPATIENT)
Dept: OBSTETRICS AND GYNECOLOGY | Facility: CLINIC | Age: 28
End: 2018-11-01

## 2018-11-01 VITALS — BODY MASS INDEX: 48.06 KG/M2 | WEIGHT: 280 LBS | SYSTOLIC BLOOD PRESSURE: 99 MMHG | DIASTOLIC BLOOD PRESSURE: 66 MMHG

## 2018-11-01 DIAGNOSIS — Z34.82 ENCOUNTER FOR SUPERVISION OF OTHER NORMAL PREGNANCY IN SECOND TRIMESTER: Primary | ICD-10-CM

## 2018-11-01 DIAGNOSIS — O99.212 OBESITY AFFECTING PREGNANCY IN SECOND TRIMESTER: ICD-10-CM

## 2018-11-01 PROCEDURE — 0502F SUBSEQUENT PRENATAL CARE: CPT | Performed by: OBSTETRICS & GYNECOLOGY

## 2018-11-01 NOTE — PROGRESS NOTES
OB follow up     Wanda Solis is a 28 y.o.  26w3d being seen today for her obstetrical visit.  Patient reports no complaints. Fetal movement: normal.    Review of Systems  No bleeding, No cramping/contractions     BP 99/66   Wt 127 kg (280 lb)   LMP 2018 (Approximate)   BMI 48.06 kg/m²     FHT: 146 BPM   Uterine Size: 26 cm       Assessment    1) pregnancy at 26w3d   Rh+, BS today   Peds, prenatal classes and tours   TDaP - Flu encouraged   Questions about anesthesia, L&D, Breast feeding and contraception   2) obeisty in pregnancy, weight gain stable     Plan    Reviewed this stage of pregnancy  Problem list updated   Follow up in 2 weeks    Amor Michele MD   2018  1:57 PM

## 2018-11-02 LAB — GLUCOSE 1H P 50 G GLC PO SERPL-MCNC: 100 MG/DL (ref 65–139)

## 2018-11-15 ENCOUNTER — ROUTINE PRENATAL (OUTPATIENT)
Dept: OBSTETRICS AND GYNECOLOGY | Facility: CLINIC | Age: 28
End: 2018-11-15

## 2018-11-15 VITALS — BODY MASS INDEX: 48.23 KG/M2 | WEIGHT: 281 LBS | DIASTOLIC BLOOD PRESSURE: 84 MMHG | SYSTOLIC BLOOD PRESSURE: 119 MMHG

## 2018-11-15 DIAGNOSIS — Z34.83 ENCOUNTER FOR SUPERVISION OF OTHER NORMAL PREGNANCY IN THIRD TRIMESTER: Primary | ICD-10-CM

## 2018-11-15 DIAGNOSIS — O99.213 OBESITY IN PREGNANCY, ANTEPARTUM, THIRD TRIMESTER: ICD-10-CM

## 2018-11-15 PROCEDURE — 0502F SUBSEQUENT PRENATAL CARE: CPT | Performed by: OBSTETRICS & GYNECOLOGY

## 2018-11-15 NOTE — PROGRESS NOTES
OB follow up     Wanda Solis is a 28 y.o.  28w3d being seen today for her obstetrical visit.  Patient reports no complaints. Fetal movement: normal.    Review of Systems  No bleeding, No cramping/contractions     /84   Wt 127 kg (281 lb)   LMP 2018 (Approximate)   BMI 48.23 kg/m²     FHT: 154 BPM   Uterine Size: 26 cm       Assessment    1) pregnancy at 28w3d   Rh+, BS good   2) obesity, weight stable.   To continue current care     Plan    Reviewed this stage of pregnancy  Problem list updated   Follow up in 2 weeks    Amor Michele MD   11/15/2018  3:14 PM

## 2018-11-29 ENCOUNTER — ROUTINE PRENATAL (OUTPATIENT)
Dept: OBSTETRICS AND GYNECOLOGY | Facility: CLINIC | Age: 28
End: 2018-11-29

## 2018-11-29 VITALS — WEIGHT: 284 LBS | BODY MASS INDEX: 48.75 KG/M2 | DIASTOLIC BLOOD PRESSURE: 83 MMHG | SYSTOLIC BLOOD PRESSURE: 118 MMHG

## 2018-11-29 DIAGNOSIS — Z34.83 ENCOUNTER FOR SUPERVISION OF OTHER NORMAL PREGNANCY IN THIRD TRIMESTER: Primary | ICD-10-CM

## 2018-11-29 DIAGNOSIS — O99.213 OBESITY IN PREGNANCY, ANTEPARTUM, THIRD TRIMESTER: ICD-10-CM

## 2018-11-29 PROCEDURE — 0502F SUBSEQUENT PRENATAL CARE: CPT | Performed by: OBSTETRICS & GYNECOLOGY

## 2018-11-29 NOTE — PROGRESS NOTES
OB follow up     Wanda Solis is a 28 y.o.  30w3d being seen today for her obstetrical visit.  Patient reports no complaints. Fetal movement: normal.    Review of Systems  No bleeding, No cramping/contractions     /83   Wt 129 kg (284 lb)   LMP 2018 (Approximate)   BMI 48.75 kg/m²     FHT: 136 BPM   Uterine Size: 30 cm       Assessment    1) pregnancy at 30w3d   Rh+, BS good   2) Obesity in pregnancy  Check growth next visit.       Plan    Reviewed this stage of pregnancy  Problem list updated   Follow up in 2 weeks    Amor Michele MD   2018  2:57 PM

## 2018-12-11 ENCOUNTER — PROCEDURE VISIT (OUTPATIENT)
Dept: OBSTETRICS AND GYNECOLOGY | Facility: CLINIC | Age: 28
End: 2018-12-11

## 2018-12-11 DIAGNOSIS — O26.843 UTERINE SIZE DATE DISCREPANCY PREGNANCY, THIRD TRIMESTER: ICD-10-CM

## 2018-12-11 DIAGNOSIS — O99.213 OBESITY AFFECTING PREGNANCY IN THIRD TRIMESTER: Primary | ICD-10-CM

## 2018-12-11 PROCEDURE — 76816 OB US FOLLOW-UP PER FETUS: CPT | Performed by: OBSTETRICS & GYNECOLOGY

## 2018-12-14 ENCOUNTER — ROUTINE PRENATAL (OUTPATIENT)
Dept: OBSTETRICS AND GYNECOLOGY | Facility: CLINIC | Age: 28
End: 2018-12-14

## 2018-12-14 VITALS — SYSTOLIC BLOOD PRESSURE: 113 MMHG | DIASTOLIC BLOOD PRESSURE: 74 MMHG | WEIGHT: 285 LBS | BODY MASS INDEX: 48.92 KG/M2

## 2018-12-14 DIAGNOSIS — Z34.83 ENCOUNTER FOR SUPERVISION OF OTHER NORMAL PREGNANCY IN THIRD TRIMESTER: Primary | ICD-10-CM

## 2018-12-14 PROCEDURE — 0502F SUBSEQUENT PRENATAL CARE: CPT | Performed by: OBSTETRICS & GYNECOLOGY

## 2018-12-14 NOTE — PROGRESS NOTES
OB follow up     Wanda Solis is a 28 y.o.  32w4d being seen today for her obstetrical visit.  Patient reports no complaints. Fetal movement: normal.    Review of Systems  No bleeding, No cramping/contractions     /74   Wt 129 kg (285 lb)   LMP 2018 (Approximate)   BMI 48.92 kg/m²     FHT: 144 BPM   Uterine Size: 32 cm       Assessment    1) pregnancy at 32w4d   Doing well  2) Obesity, interval weight excellent.       Plan    Reviewed this stage of pregnancy  Problem list updated   Follow up in 2 weeks    Amor Michele MD   2018  10:08 AM

## 2018-12-28 ENCOUNTER — ROUTINE PRENATAL (OUTPATIENT)
Dept: OBSTETRICS AND GYNECOLOGY | Facility: CLINIC | Age: 28
End: 2018-12-28

## 2018-12-28 VITALS — DIASTOLIC BLOOD PRESSURE: 90 MMHG | WEIGHT: 285 LBS | BODY MASS INDEX: 48.92 KG/M2 | SYSTOLIC BLOOD PRESSURE: 132 MMHG

## 2018-12-28 DIAGNOSIS — Z34.83 ENCOUNTER FOR SUPERVISION OF OTHER NORMAL PREGNANCY IN THIRD TRIMESTER: Primary | ICD-10-CM

## 2018-12-28 DIAGNOSIS — L73.2 VULVAL HIDRADENITIS SUPPURATIVA: ICD-10-CM

## 2018-12-28 PROCEDURE — 0502F SUBSEQUENT PRENATAL CARE: CPT | Performed by: OBSTETRICS & GYNECOLOGY

## 2018-12-28 RX ORDER — DICLOXACILLIN SODIUM 250 MG/1
250 CAPSULE ORAL 4 TIMES DAILY
Qty: 40 CAPSULE | Refills: 0 | Status: SHIPPED | OUTPATIENT
Start: 2018-12-28 | End: 2019-01-07

## 2018-12-28 NOTE — PROGRESS NOTES
OB follow up     Wanda Solis is a 28 y.o.  34w4d being seen today for her obstetrical visit.  Patient reports a boil in the vaginal area, very painful. . Fetal movement: normal.    Review of Systems  No bleeding, No cramping/contractions     /90   Wt 129 kg (285 lb)   LMP 2018 (Approximate)   BMI 48.92 kg/m²     FHT: 156 BPM   Uterine Size: 34 cm       Assessment    1) pregnancy at 34w4d   2) Hidradenitis on vulva  Trial of dicloxacillin   Local care reviewed.          Plan    Reviewed this stage of pregnancy  Problem list updated   Follow up in 1 weeks    Amor Michele MD   2018  4:30 PM

## 2019-01-03 ENCOUNTER — ROUTINE PRENATAL (OUTPATIENT)
Dept: OBSTETRICS AND GYNECOLOGY | Facility: CLINIC | Age: 29
End: 2019-01-03

## 2019-01-03 VITALS — WEIGHT: 286 LBS | DIASTOLIC BLOOD PRESSURE: 84 MMHG | SYSTOLIC BLOOD PRESSURE: 125 MMHG | BODY MASS INDEX: 49.09 KG/M2

## 2019-01-03 DIAGNOSIS — O99.213 OBESITY AFFECTING PREGNANCY IN THIRD TRIMESTER: ICD-10-CM

## 2019-01-03 DIAGNOSIS — Z36.9 ANTENATAL SCREENING ENCOUNTER: ICD-10-CM

## 2019-01-03 DIAGNOSIS — Z34.83 ENCOUNTER FOR SUPERVISION OF OTHER NORMAL PREGNANCY IN THIRD TRIMESTER: Primary | ICD-10-CM

## 2019-01-03 PROCEDURE — 0502F SUBSEQUENT PRENATAL CARE: CPT | Performed by: OBSTETRICS & GYNECOLOGY

## 2019-01-03 NOTE — PROGRESS NOTES
Ob follow up    Wanda Solis is a 28 y.o.  35w3d patient being seen today for her obstetrical visit. Patient reports no complaints. Fetal movement: normal.      ROS - Denies leaking fluid, vaginal bleeding and notes good fetal movement.     /84   Wt 130 kg (286 lb)   LMP 2018 (Approximate)   BMI 49.09 kg/m²     FHT:  134 BPM    Uterine Size: 35 cm   Presentations: cephalic   Pelvic Exam:     Dilation: Closed    Effacement: 25%    Station:  -2                 Assessment    1) Pregnancy at 35w3d  2) Fetal status reassuring   3) GBS status - done today  4) Obesity   Weight stable last 3 visits.   5) Hidradenitis better on dicloxacillin     Plan    [place] warnings   [facility] [place]        Amor Michele MD   1/3/2019  2:48 PM

## 2019-01-05 LAB — GP B STREP DNA SPEC QL NAA+PROBE: NEGATIVE

## 2019-01-10 ENCOUNTER — ROUTINE PRENATAL (OUTPATIENT)
Dept: OBSTETRICS AND GYNECOLOGY | Facility: CLINIC | Age: 29
End: 2019-01-10

## 2019-01-10 VITALS — BODY MASS INDEX: 49.61 KG/M2 | WEIGHT: 289 LBS | SYSTOLIC BLOOD PRESSURE: 124 MMHG | DIASTOLIC BLOOD PRESSURE: 86 MMHG

## 2019-01-10 DIAGNOSIS — Z34.83 ENCOUNTER FOR SUPERVISION OF OTHER NORMAL PREGNANCY IN THIRD TRIMESTER: Primary | ICD-10-CM

## 2019-01-10 PROCEDURE — 0502F SUBSEQUENT PRENATAL CARE: CPT | Performed by: OBSTETRICS & GYNECOLOGY

## 2019-01-10 NOTE — PROGRESS NOTES
Ob follow up    Wanda Solis is a 28 y.o.  36w3d patient being seen today for her obstetrical visit. Patient reports no complaints. Fetal movement: normal.      ROS - Denies leaking fluid, vaginal bleeding and notes good fetal movement.     /86   Wt 131 kg (289 lb)   LMP 2018 (Approximate)   BMI 49.61 kg/m²     FHT:  154BPM    Uterine Size: 35 cm   Presentations: cephalic   Pelvic Exam:     Dilation: 2cm    Effacement: 50%    Station:  -2                 Assessment    1) Pregnancy at 36w3d  2) Fetal status reassuring   3) GBS status - negative       Plan    Labor warnings   [facility] [place]        Amor Michele MD   1/10/2019  3:26 PM

## 2019-01-16 ENCOUNTER — ROUTINE PRENATAL (OUTPATIENT)
Dept: OBSTETRICS AND GYNECOLOGY | Facility: CLINIC | Age: 29
End: 2019-01-16

## 2019-01-16 VITALS — WEIGHT: 289 LBS | SYSTOLIC BLOOD PRESSURE: 116 MMHG | DIASTOLIC BLOOD PRESSURE: 67 MMHG | BODY MASS INDEX: 49.61 KG/M2

## 2019-01-16 DIAGNOSIS — Z34.83 ENCOUNTER FOR SUPERVISION OF OTHER NORMAL PREGNANCY IN THIRD TRIMESTER: Primary | ICD-10-CM

## 2019-01-16 PROCEDURE — 0502F SUBSEQUENT PRENATAL CARE: CPT | Performed by: OBSTETRICS & GYNECOLOGY

## 2019-01-16 NOTE — PROGRESS NOTES
Ob follow up    Wanda Solis is a 28 y.o.  37w2d patient being seen today for her obstetrical visit. Patient reports no complaints. Fetal movement: normal.      ROS - Denies leaking fluid, vaginal bleeding and notes good fetal movement.     /67   Wt 131 kg (289 lb)   LMP 2018 (Approximate)   BMI 49.61 kg/m²     FHT:  144 BPM    Uterine Size: 34 cm   Presentations: cephalic   Pelvic Exam:     Dilation: 2cm    Effacement: 50%    Station:  -2                 Assessment    1) Pregnancy at 37w2d  2) Fetal status reassuring   3) GBS status - negative       Plan    Labor warnings   [facility] [place]        Amor Michele MD   2019  1:36 PM

## 2019-01-22 ENCOUNTER — HOSPITAL ENCOUNTER (OUTPATIENT)
Facility: HOSPITAL | Age: 29
Setting detail: OBSERVATION
LOS: 1 days | Discharge: HOME | End: 2019-01-23
Attending: EMERGENCY MEDICINE | Admitting: OBSTETRICS & GYNECOLOGY

## 2019-01-22 ENCOUNTER — APPOINTMENT (OUTPATIENT)
Dept: GENERAL RADIOLOGY | Facility: HOSPITAL | Age: 29
End: 2019-01-22

## 2019-01-22 DIAGNOSIS — Z3A.38 38 WEEKS GESTATION OF PREGNANCY: ICD-10-CM

## 2019-01-22 DIAGNOSIS — J10.1 INFLUENZA A: Primary | ICD-10-CM

## 2019-01-22 DIAGNOSIS — E86.0 DEHYDRATION: ICD-10-CM

## 2019-01-22 LAB
ALBUMIN SERPL-MCNC: 3.2 G/DL (ref 3.5–5.2)
ALBUMIN/GLOB SERPL: 0.8 G/DL
ALP SERPL-CCNC: 106 U/L (ref 39–117)
ALT SERPL W P-5'-P-CCNC: 12 U/L (ref 1–33)
ANION GAP SERPL CALCULATED.3IONS-SCNC: 14 MMOL/L
ANISOCYTOSIS BLD QL: NORMAL
AST SERPL-CCNC: 16 U/L (ref 1–32)
B PARAPERT DNA SPEC QL NAA+PROBE: NOT DETECTED
B PERT DNA SPEC QL NAA+PROBE: NOT DETECTED
BACTERIA UR QL AUTO: ABNORMAL /HPF
BASOPHILS # BLD AUTO: 0.01 10*3/MM3 (ref 0–0.2)
BASOPHILS NFR BLD AUTO: 0.1 % (ref 0–1.5)
BILIRUB SERPL-MCNC: 0.4 MG/DL (ref 0.1–1.2)
BILIRUB UR QL STRIP: NEGATIVE
BUN BLD-MCNC: 5 MG/DL (ref 6–20)
BUN/CREAT SERPL: 8.9 (ref 7–25)
C PNEUM DNA NPH QL NAA+NON-PROBE: NOT DETECTED
CALCIUM SPEC-SCNC: 8.7 MG/DL (ref 8.6–10.5)
CHLORIDE SERPL-SCNC: 103 MMOL/L (ref 98–107)
CLARITY UR: ABNORMAL
CO2 SERPL-SCNC: 17 MMOL/L (ref 22–29)
COLOR UR: ABNORMAL
CREAT BLD-MCNC: 0.56 MG/DL (ref 0.57–1)
DEPRECATED RDW RBC AUTO: 50.4 FL (ref 37–54)
EOSINOPHIL # BLD AUTO: 0.02 10*3/MM3 (ref 0–0.7)
EOSINOPHIL NFR BLD AUTO: 0.3 % (ref 0.3–6.2)
ERYTHROCYTE [DISTWIDTH] IN BLOOD BY AUTOMATED COUNT: 18.7 % (ref 11.7–13)
FLUAV H1 2009 PAND RNA NPH QL NAA+PROBE: NOT DETECTED
FLUAV H1 HA GENE NPH QL NAA+PROBE: NOT DETECTED
FLUAV H3 RNA NPH QL NAA+PROBE: DETECTED
FLUAV SUBTYP SPEC NAA+PROBE: NOT DETECTED
FLUBV RNA ISLT QL NAA+PROBE: NOT DETECTED
GFR SERPL CREATININE-BSD FRML MDRD: >150 ML/MIN/1.73
GLOBULIN UR ELPH-MCNC: 4 GM/DL
GLUCOSE BLD-MCNC: 128 MG/DL (ref 65–99)
GLUCOSE UR STRIP-MCNC: NEGATIVE MG/DL
HADV DNA SPEC NAA+PROBE: NOT DETECTED
HCOV 229E RNA SPEC QL NAA+PROBE: NOT DETECTED
HCOV HKU1 RNA SPEC QL NAA+PROBE: NOT DETECTED
HCOV NL63 RNA SPEC QL NAA+PROBE: NOT DETECTED
HCOV OC43 RNA SPEC QL NAA+PROBE: NOT DETECTED
HCT VFR BLD AUTO: 27 % (ref 35.6–45.5)
HGB BLD-MCNC: 8.6 G/DL (ref 11.9–15.5)
HGB UR QL STRIP.AUTO: NEGATIVE
HMPV RNA NPH QL NAA+NON-PROBE: NOT DETECTED
HPIV1 RNA SPEC QL NAA+PROBE: NOT DETECTED
HPIV2 RNA SPEC QL NAA+PROBE: NOT DETECTED
HPIV3 RNA NPH QL NAA+PROBE: NOT DETECTED
HPIV4 P GENE NPH QL NAA+PROBE: NOT DETECTED
HYALINE CASTS UR QL AUTO: ABNORMAL /LPF
IMM GRANULOCYTES # BLD AUTO: 0.05 10*3/MM3 (ref 0–0.03)
IMM GRANULOCYTES NFR BLD AUTO: 0.6 % (ref 0–0.5)
KETONES UR QL STRIP: ABNORMAL
LEUKOCYTE ESTERASE UR QL STRIP.AUTO: ABNORMAL
LYMPHOCYTES # BLD AUTO: 1.17 10*3/MM3 (ref 0.9–4.8)
LYMPHOCYTES NFR BLD AUTO: 15 % (ref 19.6–45.3)
M PNEUMO IGG SER IA-ACNC: NOT DETECTED
MCH RBC QN AUTO: 23.5 PG (ref 26.9–32)
MCHC RBC AUTO-ENTMCNC: 31.9 G/DL (ref 32.4–36.3)
MCV RBC AUTO: 73.8 FL (ref 80.5–98.2)
MICROCYTES BLD QL: NORMAL
MONOCYTES # BLD AUTO: 0.66 10*3/MM3 (ref 0.2–1.2)
MONOCYTES NFR BLD AUTO: 8.4 % (ref 5–12)
NEUTROPHILS # BLD AUTO: 5.91 10*3/MM3 (ref 1.9–8.1)
NEUTROPHILS NFR BLD AUTO: 75.6 % (ref 42.7–76)
NITRITE UR QL STRIP: NEGATIVE
PH UR STRIP.AUTO: 5.5 [PH] (ref 5–8)
PLAT MORPH BLD: NORMAL
PLATELET # BLD AUTO: 248 10*3/MM3 (ref 140–500)
PMV BLD AUTO: 9 FL (ref 6–12)
POTASSIUM BLD-SCNC: 3.4 MMOL/L (ref 3.5–5.2)
PROT SERPL-MCNC: 7.2 G/DL (ref 6–8.5)
PROT UR QL STRIP: ABNORMAL
RBC # BLD AUTO: 3.66 10*6/MM3 (ref 3.9–5.2)
RBC # UR: ABNORMAL /HPF
REF LAB TEST METHOD: ABNORMAL
RHINOVIRUS RNA SPEC NAA+PROBE: NOT DETECTED
RSV RNA NPH QL NAA+NON-PROBE: NOT DETECTED
SODIUM BLD-SCNC: 134 MMOL/L (ref 136–145)
SP GR UR STRIP: 1.03 (ref 1–1.03)
SQUAMOUS #/AREA URNS HPF: ABNORMAL /HPF
UROBILINOGEN UR QL STRIP: ABNORMAL
WBC MORPH BLD: NORMAL
WBC NRBC COR # BLD: 7.82 10*3/MM3 (ref 4.5–10.7)
WBC UR QL AUTO: ABNORMAL /HPF

## 2019-01-22 PROCEDURE — 87486 CHLMYD PNEUM DNA AMP PROBE: CPT | Performed by: EMERGENCY MEDICINE

## 2019-01-22 PROCEDURE — 87798 DETECT AGENT NOS DNA AMP: CPT | Performed by: EMERGENCY MEDICINE

## 2019-01-22 PROCEDURE — 99284 EMERGENCY DEPT VISIT MOD MDM: CPT

## 2019-01-22 PROCEDURE — 87581 M.PNEUMON DNA AMP PROBE: CPT | Performed by: EMERGENCY MEDICINE

## 2019-01-22 PROCEDURE — 85025 COMPLETE CBC W/AUTO DIFF WBC: CPT | Performed by: EMERGENCY MEDICINE

## 2019-01-22 PROCEDURE — 80053 COMPREHEN METABOLIC PANEL: CPT | Performed by: EMERGENCY MEDICINE

## 2019-01-22 PROCEDURE — 71045 X-RAY EXAM CHEST 1 VIEW: CPT

## 2019-01-22 PROCEDURE — 59025 FETAL NON-STRESS TEST: CPT | Performed by: OBSTETRICS & GYNECOLOGY

## 2019-01-22 PROCEDURE — 87633 RESP VIRUS 12-25 TARGETS: CPT | Performed by: EMERGENCY MEDICINE

## 2019-01-22 PROCEDURE — 59025 FETAL NON-STRESS TEST: CPT

## 2019-01-22 PROCEDURE — G0378 HOSPITAL OBSERVATION PER HR: HCPCS

## 2019-01-22 PROCEDURE — 81001 URINALYSIS AUTO W/SCOPE: CPT | Performed by: EMERGENCY MEDICINE

## 2019-01-22 PROCEDURE — 85007 BL SMEAR W/DIFF WBC COUNT: CPT | Performed by: EMERGENCY MEDICINE

## 2019-01-22 PROCEDURE — 99218 PR INITIAL OBSERVATION CARE/DAY 30 MINUTES: CPT | Performed by: OBSTETRICS & GYNECOLOGY

## 2019-01-22 RX ORDER — ACETAMINOPHEN 500 MG
1000 TABLET ORAL ONCE
Status: COMPLETED | OUTPATIENT
Start: 2019-01-22 | End: 2019-01-22

## 2019-01-22 RX ORDER — PRENATAL VIT NO.126/IRON/FOLIC 28MG-0.8MG
1 TABLET ORAL DAILY
Status: DISCONTINUED | OUTPATIENT
Start: 2019-01-23 | End: 2019-01-23 | Stop reason: HOSPADM

## 2019-01-22 RX ORDER — OSELTAMIVIR PHOSPHATE 75 MG/1
75 CAPSULE ORAL ONCE
Status: COMPLETED | OUTPATIENT
Start: 2019-01-22 | End: 2019-01-23

## 2019-01-22 RX ORDER — OSELTAMIVIR PHOSPHATE 75 MG/1
75 CAPSULE ORAL EVERY 12 HOURS SCHEDULED
Status: DISCONTINUED | OUTPATIENT
Start: 2019-01-23 | End: 2019-01-23 | Stop reason: HOSPADM

## 2019-01-22 RX ADMIN — ACETAMINOPHEN 1000 MG: 500 TABLET, FILM COATED ORAL at 18:22

## 2019-01-22 RX ADMIN — SODIUM CHLORIDE 1000 ML: 9 INJECTION, SOLUTION INTRAVENOUS at 19:01

## 2019-01-22 NOTE — ED PROVIDER NOTES
" EMERGENCY DEPARTMENT ENCOUNTER    Room Number:  02/02  PCP: Ronal Mcgee MD   OBGYN: Dr. Michele  Historian: Patient, Family      HPI  Chief Complaint: Flu-Like Symptoms  Context: Wanda Solis is a 28 y.o. female who is about 38 weeks pregnant currently. G4, P2, Ab1. Pt presents to the ED c/o flu-like symptoms onset about 3 days ago. Specifically, pt has had cough, congestion, subjective fever, chills, fatigue, nausea, and lightheadedness. Per family, pt had an episode of epistaxis earlier today, which has now resolved. Pt denies chest pain, dyspnea, headache, neck pain/stiffness, abdominal pain, new/worsening vomiting, dysuria, and sore throat. However, pt states that she had mild blood from her genitourinary region about 3-4 days ago (now resolved); pt is unsure if this was vaginal bleeding or hematuria. Pt states that she has felt fetal movement today. Pt states that she has not received the flu vaccine this season. There are no other complaints at this time.     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   • Positive GBS test 11/20/2017   • Obesity affecting pregnancy in third trimester 12/14/2017   • Obesity in pregnancy, antepartum, third trimester 12/14/2017   • Postpartum care and examination immediately after delivery 12/15/2017   • Near syncope 06/27/2018   • Mastitis 09/01/2018     Resolved Ambulatory Problems     Diagnosis Date Noted   • Breast abscess during pregnancy, antepartum 09/01/2018     Past Medical History:   Diagnosis Date   • Abnormal Pap smear of cervix    • Urogenital trichomoniasis          PAST SURGICAL HISTORY  Past Surgical History:   Procedure Laterality Date   • DILATATION AND CURETTAGE   "   • INCISION AND DRAINAGE PERIRECTAL ABSCESS Left 9/1/2018    Procedure: INCISION AND DRAINAGE OF LEFT BREAST;  Surgeon: Parvez Long MD;  Location: [facility] OR;  Service: General   • WISDOM TOOTH EXTRACTION           FAMILY HISTORY  Family History   Problem Relation Age of Onset   • Breast cancer Neg Hx    • Ovarian cancer Neg Hx    • Uterine cancer Neg Hx    • Colon cancer Neg Hx    • Deep vein thrombosis Neg Hx    • Pulmonary embolism Neg Hx          SOCIAL HISTORY  Social History     Socioeconomic History   • Marital status: Single     Spouse name: Not on file   • Number of children: Not on file   • Years of education: Not on file   • Highest education level: Not on file   Social Needs   • Financial resource strain: Not on file   • Food insecurity - worry: Not on file   • Food insecurity - inability: Not on file   • Transportation needs - medical: Not on file   • Transportation needs - non-medical: Not on file   Occupational History   • Not on file   Tobacco Use   • Smoking status: Never Smoker   • Smokeless tobacco: Never Used   Substance and Sexual Activity   • Alcohol use: No   • Drug use: No   • Sexual activity: Yes     Partners: Male     Birth control/protection: None   Other Topics Concern   • Not on file   Social History Narrative   • Not on file         ALLERGIES  Patient has no known allergies.        REVIEW OF SYSTEMS  Review of Systems   Constitutional: Positive for chills, fatigue and fever (subjective).   HENT: Positive for congestion and nosebleeds. Negative for rhinorrhea and sore throat.    Eyes: Negative for pain.   Respiratory: Positive for cough. Negative for shortness of breath.    Cardiovascular: Negative for chest pain, palpitations and leg swelling.   Gastrointestinal: Positive for nausea. Negative for abdominal pain, diarrhea and vomiting.   Genitourinary: Negative for difficulty urinating, dysuria, flank pain and frequency.   Musculoskeletal: Negative for myalgias, neck pain and  neck stiffness.   Skin: Negative for rash.   Neurological: Positive for light-headedness. Negative for speech difficulty, weakness, numbness and headaches.   Psychiatric/Behavioral: Negative.    All other systems reviewed and are negative.           PHYSICAL EXAM  ED Triage Vitals [01/22/19 1802]   Temp Heart Rate Resp BP SpO2   100.4 °F (38 °C) (!) 135 18 114/84 96 %      Temp src Heart Rate Source Patient Position BP Location FiO2 (%)   Tympanic Monitor -- -- --       Physical Exam   Constitutional: She is oriented to person, place, and time. She has a sickly appearance. She appears distressed (mild).   HENT:   Head: Normocephalic.   Mouth/Throat: Mucous membranes are normal.   Eyes: EOM are normal. Pupils are equal, round, and reactive to light.   Neck: Normal range of motion. Neck supple.   Cardiovascular: Regular rhythm and normal heart sounds. Tachycardia present.   Pulmonary/Chest: Effort normal and breath sounds normal. No respiratory distress. She has no decreased breath sounds. She has no wheezes. She has no rhonchi. She has no rales.   Abdominal: Soft. There is no tenderness. There is no rebound and no guarding.   Pt has a gravid abdomen.    Musculoskeletal: Normal range of motion.   Neurological: She is alert and oriented to person, place, and time. She has normal sensation.   Skin: Skin is warm and dry.   Psychiatric: Mood and affect normal.   Nursing note and vitals reviewed.          LAB RESULTS  Recent Results (from the past 24 hour(s))   Respiratory Panel, PCR - Swab, Nasopharynx    Collection Time: 01/22/19  6:24 PM   Result Value Ref Range    ADENOVIRUS, PCR Not Detected Not Detected    Coronavirus 229E Not Detected Not Detected    Coronavirus HKU1 Not Detected Not Detected    Coronavirus NL63 Not Detected Not Detected    Coronavirus OC43 Not Detected Not Detected    Human Metapneumovirus Not Detected Not Detected    Human Rhinovirus/Enterovirus Not Detected Not Detected    Influenza B PCR Not  Detected Not Detected    Parainfluenza Virus 1 Not Detected Not Detected    Parainfluenza Virus 2 Not Detected Not Detected    Parainfluenza Virus 3 Not Detected Not Detected    Parainfluenza Virus 4 Not Detected Not Detected    Bordetella pertussis pcr Not Detected Not Detected    Influenza A H1 2009 PCR Not Detected Not Detected    Chlamydophila pneumoniae PCR Not Detected Not Detected    Mycoplasma pneumo by PCR Not Detected Not Detected    Influenza A PCR Not Detected Not Detected    Influenza A H3 Detected (A) Not Detected    Influenza A H1 Not Detected Not Detected    RSV, PCR Not Detected Not Detected    Bordetella parapertussis PCR Not Detected Not Detected   Comprehensive Metabolic Panel    Collection Time: 01/22/19  6:58 PM   Result Value Ref Range    Glucose 128 (H) 65 - 99 mg/dL    BUN 5 (L) 6 - 20 mg/dL    Creatinine 0.56 (L) 0.57 - 1.00 mg/dL    Sodium 134 (L) 136 - 145 mmol/L    Potassium 3.4 (L) 3.5 - 5.2 mmol/L    Chloride 103 98 - 107 mmol/L    CO2 17.0 (L) 22.0 - 29.0 mmol/L    Calcium 8.7 8.6 - 10.5 mg/dL    Total Protein 7.2 6.0 - 8.5 g/dL    Albumin 3.20 (L) 3.50 - 5.20 g/dL    ALT (SGPT) 12 1 - 33 U/L    AST (SGOT) 16 1 - 32 U/L    Alkaline Phosphatase 106 39 - 117 U/L    Total Bilirubin 0.4 0.1 - 1.2 mg/dL    eGFR  African Amer >150 >60 mL/min/1.73    Globulin 4.0 gm/dL    A/G Ratio 0.8 g/dL    BUN/Creatinine Ratio 8.9 7.0 - 25.0    Anion Gap 14.0 mmol/L   CBC Auto Differential    Collection Time: 01/22/19  6:58 PM   Result Value Ref Range    WBC 7.82 4.50 - 10.70 10*3/mm3    RBC 3.66 (L) 3.90 - 5.20 10*6/mm3    Hemoglobin 8.6 (L) 11.9 - 15.5 g/dL    Hematocrit 27.0 (L) 35.6 - 45.5 %    MCV 73.8 (L) 80.5 - 98.2 fL    MCH 23.5 (L) 26.9 - 32.0 pg    MCHC 31.9 (L) 32.4 - 36.3 g/dL    RDW 18.7 (H) 11.7 - 13.0 %    RDW-SD 50.4 37.0 - 54.0 fl    MPV 9.0 6.0 - 12.0 fL    Platelets 248 140 - 500 10*3/mm3    Neutrophil % 75.6 42.7 - 76.0 %    Lymphocyte % 15.0 (L) 19.6 - 45.3 %    Monocyte % 8.4 5.0 -  12.0 %    Eosinophil % 0.3 0.3 - 6.2 %    Basophil % 0.1 0.0 - 1.5 %    Immature Grans % 0.6 (H) 0.0 - 0.5 %    Neutrophils, Absolute 5.91 1.90 - 8.10 10*3/mm3    Lymphocytes, Absolute 1.17 0.90 - 4.80 10*3/mm3    Monocytes, Absolute 0.66 0.20 - 1.20 10*3/mm3    Eosinophils, Absolute 0.02 0.00 - 0.70 10*3/mm3    Basophils, Absolute 0.01 0.00 - 0.20 10*3/mm3    Immature Grans, Absolute 0.05 (H) 0.00 - 0.03 10*3/mm3   Scan Slide    Collection Time: 01/22/19  6:58 PM   Result Value Ref Range    Anisocytosis Mod/2+ None Seen    Microcytes Mod/2+ None Seen    WBC Morphology Normal Normal    Platelet Morphology Normal Normal   Urinalysis With Microscopic If Indicated (No Culture) - Urine, Clean Catch    Collection Time: 01/22/19  7:04 PM   Result Value Ref Range    Color, UA Dark Yellow (A) Yellow, Straw    Appearance, UA Cloudy (A) Clear    pH, UA 5.5 5.0 - 8.0    Specific Gravity, UA 1.029 1.005 - 1.030    Glucose, UA Negative Negative    Ketones, UA 40 mg/dL (2+) (A) Negative    Bilirubin, UA Negative Negative    Blood, UA Negative Negative    Protein, UA 30 mg/dL (1+) (A) Negative    Leuk Esterase, UA Small (1+) (A) Negative    Nitrite, UA Negative Negative    Urobilinogen, UA 1.0 E.U./dL 0.2 - 1.0 E.U./dL   Urinalysis, Microscopic Only - Urine, Clean Catch    Collection Time: 01/22/19  7:04 PM   Result Value Ref Range    RBC, UA 0-2 None Seen, 0-2 /HPF    WBC, UA 3-5 (A) None Seen, 0-2 /HPF    Bacteria, UA 1+ (A) None Seen /HPF    Squamous Epithelial Cells, UA 13-20 (A) None Seen, 0-2 /HPF    Hyaline Casts, UA None Seen None Seen /LPF    Methodology Manual Light Microscopy        Ordered the above labs and reviewed the results.        RADIOLOGY  XR Chest 1 View   Final Result    A single erect portable view was obtained. The lungs are well-expanded  and appear free of infiltrates. There are no pleural effusions. The  pulmonary vasculature is within normal limits. The heart is mildly  prominent.     IMPRESSIONS:  Borderline cardiomegaly. No evidence of acute disease  within the chest.           Ordered the above noted radiological studies. Reviewed by me in PACS.          PROCEDURES  Procedures          MEDICATIONS GIVEN IN ER  Medications   oseltamivir (TAMIFLU) capsule 75 mg (not administered)   prenatal (CLASSIC) vitamin tablet 1 tablet (not administered)   oseltamivir (TAMIFLU) capsule 75 mg (not administered)   acetaminophen (TYLENOL) tablet 1,000 mg (1,000 mg Oral Given 1/22/19 1822)   sodium chloride 0.9 % bolus 1,000 mL (0 mL Intravenous Stopped 1/22/19 2034)             PROGRESS AND CONSULTS  ED Course as of Jan 22 2208   Tue Jan 22, 2019 2104 9:05 PM  Patient is 38 weeks pregnant and has influenza.  Appears dehydrated with tachycardia and CO2 of 17.  Given fluids and antipyretics with some improvement.  Seen here by Dr. Lowry.  Will be admitted.  Will stop by L and D for NST.  [SL]      ED Course User Index  [SL] Deangelo Mckay MD       6:48 PM:  Pt's temperature is 100.4 F - Tylenol ordered. IV fluids ordered to hydrate pt. Respiratory viral panel, UA, blood work, and CXR ordered for further evaluation.    8:22 PM:  Rechecked pt. Informed pt that her respiratory viral panel is (+) for influenza A. Pt's CXR is negative acute. Pt's CO2 is 17 and pt has been tachycardic in the ER. Will discuss further course of care with the OBGYN. Pt agrees with plan.      8:25 PM:  Placed call to the OBGYN to discuss further course of care.     8:42 PM:  Discussed the case with PAULINA Ford. She will evaluate pt in the ER to determine further course of care.    8:59 PM:  Dr. Lowry has evaluated pt at bedside and will admit pt to a med/surg bed for further evaluation and management. Decision time to admit: Now.     9:07 PM:  Tamiflu ordered for pt.         MEDICAL DECISION MAKING      MDM  Number of Diagnoses or Management Options     Amount and/or Complexity of Data Reviewed  Clinical lab tests: ordered and reviewed  (Respiratory viral panel results were reviewed.)  Tests in the radiology section of CPT®: ordered and reviewed (CXR:  Borderline cardiomegaly. No evidence of acute disease  within the chest.)  Discuss the patient with other providers: yes (Discussed the case with Dr. Lowry OBGYN.  )    Patient Progress  Patient progress: stable             DIAGNOSIS  Final diagnoses:   Influenza A   Dehydration   38 weeks gestation of pregnancy           DISPOSITION  Pt admitted to med/surg.    ADMISSION    Discussed treatment plan and reason for admission with admitting physician.          Latest Documented Vital Signs:  As of 9:44 PM  BP- 103/68 HR- 106 Temp- 98 °F (36.7 °C) (Oral) O2 sat- 99%        --  Documentation assistance provided by bowen Gardner for Dr. Woody MD.  Information recorded by the scribe was done at my direction and has been verified and validated by me.           Edel Gardner  01/22/19 2203       Deangelo Mckay MD  01/22/19 6723

## 2019-01-23 ENCOUNTER — TELEPHONE (OUTPATIENT)
Dept: OBSTETRICS AND GYNECOLOGY | Facility: CLINIC | Age: 29
End: 2019-01-23

## 2019-01-23 VITALS
HEART RATE: 120 BPM | HEIGHT: 65 IN | RESPIRATION RATE: 18 BRPM | OXYGEN SATURATION: 98 % | DIASTOLIC BLOOD PRESSURE: 80 MMHG | TEMPERATURE: 98.4 F | WEIGHT: 285.5 LBS | SYSTOLIC BLOOD PRESSURE: 128 MMHG | BODY MASS INDEX: 47.57 KG/M2

## 2019-01-23 PROBLEM — J10.1 INFLUENZA A: Status: ACTIVE | Noted: 2019-01-23

## 2019-01-23 PROCEDURE — G0378 HOSPITAL OBSERVATION PER HR: HCPCS

## 2019-01-23 PROCEDURE — 96361 HYDRATE IV INFUSION ADD-ON: CPT

## 2019-01-23 PROCEDURE — 96360 HYDRATION IV INFUSION INIT: CPT

## 2019-01-23 PROCEDURE — 99217 PR OBSERVATION CARE DISCHARGE MANAGEMENT: CPT | Performed by: OBSTETRICS & GYNECOLOGY

## 2019-01-23 RX ORDER — SODIUM CHLORIDE 0.9 % (FLUSH) 0.9 %
3 SYRINGE (ML) INJECTION EVERY 12 HOURS SCHEDULED
Status: DISCONTINUED | OUTPATIENT
Start: 2019-01-23 | End: 2019-01-23 | Stop reason: HOSPADM

## 2019-01-23 RX ORDER — LIDOCAINE HYDROCHLORIDE 10 MG/ML
5 INJECTION, SOLUTION EPIDURAL; INFILTRATION; INTRACAUDAL; PERINEURAL AS NEEDED
Status: DISCONTINUED | OUTPATIENT
Start: 2019-01-23 | End: 2019-01-23 | Stop reason: HOSPADM

## 2019-01-23 RX ORDER — ACETAMINOPHEN 325 MG/1
650 TABLET ORAL EVERY 4 HOURS PRN
Status: DISCONTINUED | OUTPATIENT
Start: 2019-01-23 | End: 2019-01-23 | Stop reason: HOSPADM

## 2019-01-23 RX ORDER — SODIUM CHLORIDE, SODIUM LACTATE, POTASSIUM CHLORIDE, CALCIUM CHLORIDE 600; 310; 30; 20 MG/100ML; MG/100ML; MG/100ML; MG/100ML
100 INJECTION, SOLUTION INTRAVENOUS CONTINUOUS
Status: DISCONTINUED | OUTPATIENT
Start: 2019-01-23 | End: 2019-01-23 | Stop reason: HOSPADM

## 2019-01-23 RX ORDER — ONDANSETRON 4 MG/1
4 TABLET, FILM COATED ORAL EVERY 8 HOURS PRN
Status: DISCONTINUED | OUTPATIENT
Start: 2019-01-23 | End: 2019-01-23 | Stop reason: HOSPADM

## 2019-01-23 RX ORDER — PROMETHAZINE HYDROCHLORIDE 12.5 MG/1
12.5 TABLET ORAL EVERY 6 HOURS PRN
Status: DISCONTINUED | OUTPATIENT
Start: 2019-01-23 | End: 2019-01-23 | Stop reason: HOSPADM

## 2019-01-23 RX ORDER — OSELTAMIVIR PHOSPHATE 75 MG/1
75 CAPSULE ORAL EVERY 12 HOURS SCHEDULED
Qty: 9 CAPSULE | Refills: 0 | Status: SHIPPED | OUTPATIENT
Start: 2019-01-23 | End: 2019-01-29

## 2019-01-23 RX ORDER — SODIUM CHLORIDE 0.9 % (FLUSH) 0.9 %
3-10 SYRINGE (ML) INJECTION AS NEEDED
Status: DISCONTINUED | OUTPATIENT
Start: 2019-01-23 | End: 2019-01-23 | Stop reason: HOSPADM

## 2019-01-23 RX ADMIN — OSELTAMIVIR PHOSPHATE 75 MG: 75 CAPSULE ORAL at 01:26

## 2019-01-23 RX ADMIN — SODIUM CHLORIDE, PRESERVATIVE FREE 3 ML: 5 INJECTION INTRAVENOUS at 01:19

## 2019-01-23 RX ADMIN — OSELTAMIVIR PHOSPHATE 75 MG: 75 CAPSULE ORAL at 08:07

## 2019-01-23 RX ADMIN — SODIUM CHLORIDE, POTASSIUM CHLORIDE, SODIUM LACTATE AND CALCIUM CHLORIDE 100 ML/HR: 600; 310; 30; 20 INJECTION, SOLUTION INTRAVENOUS at 01:20

## 2019-01-23 RX ADMIN — Medication 1 TABLET: at 08:07

## 2019-01-23 NOTE — TELEPHONE ENCOUNTER
Wanda,     Yes, we do different stuff than the hospital at 38 weeks. Should wear a mask as soon as she walks in to not expose others however.      Thanks   Dr. Michele    Patient was released from hospital today and was diagnosed with Flu. She has an appt today, she wants to know if she has to keep the appt since she was just released from the hospital.       Thank  you

## 2019-01-23 NOTE — NURSING NOTE
Pt transferred from ED for NST secondary to + flu swab per Dr. Lowry order. Will go to 6P after NST or further care.

## 2019-01-23 NOTE — PLAN OF CARE
Problem: Patient Care Overview  Goal: Plan of Care Review  Outcome: Ongoing (interventions implemented as appropriate)   01/23/19 4487   Coping/Psychosocial   Plan of Care Reviewed With patient   OTHER   Outcome Summary Admitted from ER  Through L&D after Fetal non stress test done with good result. Afebrile, tachycardic. Was having frequent , congested, dry cough. Started on Tamiflu and IV Fluids. no c/o nausea. Diet taken and tolerated. Voided , urine output to monitor.  Slept on and off.      Goal: Individualization and Mutuality  Outcome: Ongoing (interventions implemented as appropriate)    Goal: Discharge Needs Assessment  Outcome: Ongoing (interventions implemented as appropriate)    Goal: Interprofessional Rounds/Family Conf  Outcome: Ongoing (interventions implemented as appropriate)      Problem: Pain, Acute (Adult)  Goal: Identify Related Risk Factors and Signs and Symptoms  Outcome: Ongoing (interventions implemented as appropriate)    Goal: Acceptable Pain Control/Comfort Level  Outcome: Ongoing (interventions implemented as appropriate)      Problem: Nausea/Vomiting (Adult)  Goal: Identify Related Risk Factors and Signs and Symptoms  Outcome: Ongoing (interventions implemented as appropriate)    Goal: Symptom Relief  Outcome: Ongoing (interventions implemented as appropriate)    Goal: Adequate Hydration  Outcome: Ongoing (interventions implemented as appropriate)

## 2019-01-23 NOTE — PROGRESS NOTES
Discharge Planning Assessment  [facility]     Patient Name: Wanda Solis  MRN: [id number]  Today's Date: 1/23/2019    Admit Date: 1/22/2019    Discharge Needs Assessment    No documentation.       Discharge Plan     Row Name 01/23/19 1129       Plan    Final Discharge Disposition Code  01 - home or self-care         Amee Salazar RN

## 2019-01-23 NOTE — PROGRESS NOTES
LOS: 1 day   Patient Care Team:  Ronal Mcgee MD as PCP - General    Chief Complaint: flu    Subjective     History of Present Illness - 28 year old  at 38 weeks with several day history of cough, congestion and body aches.  Patient presented to ER and was diagnosed with influenza.  She did not have fever or elevated WBC.  She had no evidence of pneumonia.  Patient was admitted because of tachycardia.    Today, she feels better and is asking to go home.  She has had breakfast.  She is breathing ok and ambulating.    Subjective    History taken from: patient    Objective     Vital Signs  Temp:  [97 °F (36.1 °C)-100.4 °F (38 °C)] 98.4 °F (36.9 °C)  Heart Rate:  [102-135] 120  Resp:  [18-20] 18  BP: (103-128)/(68-84) 128/80    Objective    Results Review:    WBC   Date Value Ref Range Status   2019 7.82 4.50 - 10.70 10*3/mm3 Final     RBC   Date Value Ref Range Status   2019 3.66 (L) 3.90 - 5.20 10*6/mm3 Final     Hemoglobin   Date Value Ref Range Status   2019 8.6 (L) 11.9 - 15.5 g/dL Final     Hematocrit   Date Value Ref Range Status   2019 27.0 (L) 35.6 - 45.5 % Final     MCV   Date Value Ref Range Status   2019 73.8 (L) 80.5 - 98.2 fL Final     MCH   Date Value Ref Range Status   2019 23.5 (L) 26.9 - 32.0 pg Final     MCHC   Date Value Ref Range Status   2019 31.9 (L) 32.4 - 36.3 g/dL Final     RDW   Date Value Ref Range Status   2019 18.7 (H) 11.7 - 13.0 % Final     RDW-SD   Date Value Ref Range Status   2019 50.4 37.0 - 54.0 fl Final     MPV   Date Value Ref Range Status   2019 9.0 6.0 - 12.0 fL Final     Platelets   Date Value Ref Range Status   2019 248 140 - 500 10*3/mm3 Final     Neutrophil %   Date Value Ref Range Status   2019 75.6 42.7 - 76.0 % Final     Lymphocyte %   Date Value Ref Range Status   2019 15.0 (L) 19.6 - 45.3 % Final     Monocyte %   Date Value Ref Range Status   2019 8.4 5.0 - 12.0 % Final      Eosinophil %   Date Value Ref Range Status   01/22/2019 0.3 0.3 - 6.2 % Final     Basophil %   Date Value Ref Range Status   01/22/2019 0.1 0.0 - 1.5 % Final     Immature Grans %   Date Value Ref Range Status   01/22/2019 0.6 (H) 0.0 - 0.5 % Final     Neutrophils, Absolute   Date Value Ref Range Status   01/22/2019 5.91 1.90 - 8.10 10*3/mm3 Final     Lymphocytes, Absolute   Date Value Ref Range Status   01/22/2019 1.17 0.90 - 4.80 10*3/mm3 Final     Monocytes, Absolute   Date Value Ref Range Status   01/22/2019 0.66 0.20 - 1.20 10*3/mm3 Final     Eosinophils, Absolute   Date Value Ref Range Status   01/22/2019 0.02 0.00 - 0.70 10*3/mm3 Final     Basophils, Absolute   Date Value Ref Range Status   01/22/2019 0.01 0.00 - 0.20 10*3/mm3 Final     Immature Grans, Absolute   Date Value Ref Range Status   01/22/2019 0.05 (H) 0.00 - 0.03 10*3/mm3 Final     Medication Review: Tamiflu    Assessment/Plan       Influenza A      Assessment & Plan  Influenza in pregnancy  - Patient to complete course of Tamiflu.  She should return to hospital for worsening SOA, fever or general conditions.  Patient to keep follow up with [facility].  Discussed importance of flu vaccine during pregnancy.  Patient did not receive this.    Lucas Russell MD  01/23/19  8:36 AM    Time: More than 50% of time spent in counseling and coordination of care:  Total face-to-face/floor time 30 min.  Time spent in counseling 20 min. Counseling included the following topics: treatment of influenza and worsening symptoms.

## 2019-01-23 NOTE — H&P
[facility]  Obstetric History and Physical    Chief Complaint   Patient presents with   • Flu Symptoms     Pt reports chills, cough, congestion, and fatigue since yesterday. Pt is 38 weeks pregnant. No pregnancy complaints at this time.       Subjective     Patient is a 28 y.o. female  currently at 38w1d, who presents with flu like symptoms. Started Saturday.  Was feeling hot on Saturday and had a cough. Then had a runny nose that started yesterday.  Also had one episode of emesis yesterday. Has had decreased appetite.  Ate chicken noodle soup around 4:30 today without emesis.  + myalgias + chills no dysuria, no chest pain, no sob, cough is nonproductive.  Denies sick contacts.    This pregnancy has been complicated by obesity.  Her previous obstetric/gynecological history is notable for 2 SVDs    Denies htn, asthma, diabetes    Prenatal Information:  Prenatal Results     Initial Prenatal Labs     Test Value Reference Range Date Time    Hemoglobin 9.6 g/dL 11.9 - 15.5 g/dL 18 0713    Hematocrit 28.0 % 35.6 - 45.5 % 18 0713    Platelets 248 10*3/mm3 140 - 500 10*3/mm3 19 1858    Rubella IgG 2.87 index Immune >0.99 index 18 1339    Hepatitis B SAg Negative  Negative 18 1339    Hepatitis C Ab <0.1 s/co ratio 0.0 - 0.9 s/co ratio 18 1339    RPR Non Reactive  Non Reactive 18 1339    ABO O   18 1339    Rh Positive   18 1339    Antibody Screen Negative  Negative 18 1339    HIV Non Reactive  Non Reactive 18 1339    Urine Culture        Gonorrhea Negative  Negative 18 1343    Chlamydia Negative  Negative 18 1343    TSH              2nd and 3rd Trimester     Test Value Reference Range Date Time    Hemoglobin (repeated) 8.6 g/dL 11.9 - 15.5 g/dL 19 1858    Hematocrit (repeated) 27.0 % 35.6 - 45.5 % 19 1858     mg/dL 65 - 139 mg/dL 18 1520    Antibody Screen (repeated)        GTT Fasting 79 mg/dL mg/dL 10/04/17 1250     GTT 1 Hr 144 mg/dL 40 - 300 mg/dL 10/04/17 1250    GTT 2 Hr 107 mg/dL 40 - 300 mg/dL 10/04/17 1250    GTT 3 Hr 94 mg/dL 40 - 300 mg/dL 10/04/17 1250    Group B Strep Negative  Negative 01/03/19 1515          Drug Screening     Test Value Reference Range Date Time    Amphetamine Screen        Barbiturate Screen        Benzodiazepine Screen        Methadone Screen        Phencyclidine Screen        Opiates Screen        THC Screen        Cocaine Screen        Propoxyphene Screen        Buprenorphine Screen        Methamphetamine Screen        Oxycodone Screen        Tricyclic Antidepressants Screen              Other (Risk screening)     Test Value Reference Range Date Time    Varicella IgG        Parvovirus IgG        CMV IgG        Cystic Fibrosis        Hemoglobin electrophoresis        NIPT        MSAFP-4        AFP (for NTD only)                  External Prenatal Results     Pregnancy Outside Results - Transcribed From Office Records - See Scanned Records For Details     Test Value Date Time    Hgb 8.6 g/dL 01/22/19 1858    Hct 27.0 % 01/22/19 1858    ABO O  07/05/18 1339    Rh Positive  07/05/18 1339    Antibody Screen Negative  07/05/18 1339    Glucose Fasting GTT 79 mg/dL 10/04/17 1250    Glucose Tolerance Test 1 hour 144 mg/dL 10/04/17 1250    Glucose Tolerance Test 3 hour 94 mg/dL 10/04/17 1250    Gonorrhea (discrete) Negative  07/05/18 1343    Chlamydia (discrete) Negative  07/05/18 1343    RPR Non Reactive  07/05/18 1339    VDRL       Syphilis Antibody       Rubella 2.87 index 07/05/18 1339    HBsAg Negative  07/05/18 1339    Herpes Simplex Virus PCR       Herpes Simplex VIrus Culture       HIV Non Reactive  07/05/18 1339    Hep C RNA Quant PCR       Hep C Antibody <0.1 s/co ratio 07/05/18 1339    AFP       Group B Strep Negative  01/03/19 1515    GBS Susceptibility to Clindamycin       GBS Susceptibility to Erythromycin       Fetal Fibronectin       Genetic Testing, Maternal Blood             Drug  Screening     Test Value Date Time    Urine Drug Screen       Amphetamine Screen       Barbiturate Screen       Benzodiazepine Screen       Methadone Screen       Phencyclidine Screen       Opiates Screen       THC Screen       Cocaine Screen       Propoxyphene Screen       Buprenorphine Screen       Methamphetamine Screen       Oxycodone Screen       Tricyclic Antidepressants Screen                    Past OB History:     Obstetric History       T2      L2     SAB1   TAB0   Ectopic0   Molar0   Multiple0   Live Births2       # Outcome Date GA Lbr Francisco/2nd Weight Sex Delivery Anes PTL Lv   5 Current            4 Term 12/15/17 39w2d 06:15 / 00:28 3385 g (7 lb 7.4 oz) M Vag-Spont EPI N DUANE      Name: CHARLES METCALF      Apgar1:  7                Apgar5: 8   3 Term 09 40w0d  3402 g (7 lb 8 oz) F Vag-Spont EPI N DUANE   2 AB            1 SAB                   Past Medical History: Past Medical History:   Diagnosis Date   • Abnormal Pap smear of cervix    • Urogenital trichomoniasis       Past Surgical History Past Surgical History:   Procedure Laterality Date   • DILATATION AND CURETTAGE     • INCISION AND DRAINAGE PERIRECTAL ABSCESS Left 2018    Procedure: INCISION AND DRAINAGE OF LEFT BREAST;  Surgeon: Parvez Long MD;  Location: [facility];  Service: General   • WISDOM TOOTH EXTRACTION        Family History: Family History   Problem Relation Age of Onset   • Breast cancer Neg Hx    • Ovarian cancer Neg Hx    • Uterine cancer Neg Hx    • Colon cancer Neg Hx    • Deep vein thrombosis Neg Hx    • Pulmonary embolism Neg Hx       Social History:  reports that  has never smoked. she has never used smokeless tobacco.   reports that she does not drink alcohol.   reports that she does not use drugs.        General ROS: Pertinent items are noted in HPI    Objective       Vital Signs Range for the last 24 hours  Temperature: Temp:  [98 °F (36.7 °C)-100.4 °F (38 °C)] 98 °F (36.7 °C)   Temp  Source: Temp src: Oral   BP: BP: (103-118)/(68-84) 103/68   Pulse: Heart Rate:  [106-135] 106   Respirations: Resp:  [18-20] 20   SPO2: SpO2:  [96 %-99 %] 99 %   O2 Amount (l/min):     O2 Devices Device (Oxygen Therapy): room air   Weight: Weight:  [131 kg (289 lb)] 131 kg (289 lb)     Physical Examination: General appearance - alert, well appearing, and in no distress  Mental status - alert, oriented to person, place, and time  Eyes - sclera anicteric, left eye normal, right eye normal  Chest - clear to auscultation, no wheezes, rales or rhonchi, symmetric air entry  Heart - normal rate and regular rhythm  Abdomen - soft, gravid, nontender  Pelvic - examination not indicated  Back exam - full range of motion, no tenderness, palpable spasm or pain on motion  Neurological - alert, oriented, normal speech, no focal findings or movement disorder noted  Musculoskeletal - no joint tenderness, deformity or swelling  Extremities - pedal edema 1 +  Skin - normal coloration and turgor, no rashes, no suspicious skin lesions noted      Fetal Heart Rate Assessment   Method:     Beats/min:     Baseline:     Varibility:     Accels:     Decels:     Tracing Category:       Uterine Assessment   Method:     Frequency (min):     Ctx Count in 10 min:     Duration:     Intensity:     Intensity by IUPC:     Resting Tone:     Resting Tone by IUPC:     Center Units:       Lab Results   Component Value Date    WBC 7.82 01/22/2019    HGB 8.6 (L) 01/22/2019    HCT 27.0 (L) 01/22/2019    MCV 73.8 (L) 01/22/2019     01/22/2019    NST Interpretation:    FHT with normal baseline, moderate variability, + accelerations, no decelerations  [place]: quiet    Reactive NST     Assessment/Plan     IUP at 38w1d   Influenza A  Dehydration    Assessment:    Plan:  Patient diagnosed with influenza A.  She has improved over her course of her ED visit, however is still tachycardic and with decreased PO intake. Will observe overnight with IV fluids and  Tamiflu.  Patient in agreement with plan.  If able to tolerate PO in the AM and feeling improved, like discharge home to complete tamiflu dose  Plan for NST on L&D       lEi Lowry MD  1/22/2019  8:44 PM

## 2019-01-23 NOTE — NON STRESS TEST
Wanda Solis, a  at 38w2d with an PAUL of 2019, by Ultrasound, was seen at [facility] LABOR DELIVERY for a nonstress test.    Chief Complaint   Patient presents with   • Flu Symptoms     Pt reports chills, cough, congestion, and fatigue since yesterday. Pt is 38 weeks pregnant. No pregnancy complaints at this time.       Patient Active Problem List   Diagnosis   • Hemoglobin C trait (CMS/HCC)   • Positive GBS test   • Obesity affecting pregnancy in third trimester   • Obesity in pregnancy, antepartum, third trimester   • Postpartum care and examination immediately after delivery   • Near syncope   • Mastitis   • Influenza A       Start Time:   Stop Time:     Interpretation A  Nonstress Test Interpretation A: Reactive (19 2300 : Shadia Desai RN)

## 2019-01-24 ENCOUNTER — ROUTINE PRENATAL (OUTPATIENT)
Dept: OBSTETRICS AND GYNECOLOGY | Facility: CLINIC | Age: 29
End: 2019-01-24

## 2019-01-24 VITALS — BODY MASS INDEX: 48.26 KG/M2 | WEIGHT: 290 LBS | DIASTOLIC BLOOD PRESSURE: 86 MMHG | SYSTOLIC BLOOD PRESSURE: 126 MMHG

## 2019-01-24 DIAGNOSIS — O99.213 OBESITY AFFECTING PREGNANCY IN THIRD TRIMESTER: ICD-10-CM

## 2019-01-24 DIAGNOSIS — Z34.83 ENCOUNTER FOR SUPERVISION OF OTHER NORMAL PREGNANCY IN THIRD TRIMESTER: Primary | ICD-10-CM

## 2019-01-24 PROCEDURE — 0502F SUBSEQUENT PRENATAL CARE: CPT | Performed by: OBSTETRICS & GYNECOLOGY

## 2019-01-24 NOTE — PROGRESS NOTES
Ob follow up    Wanda Solis is a 28 y.o.  38w3d patient being seen today for her obstetrical visit. Patient reports recent Flu diagnosis. Fetal movement: normal.      ROS - Denies leaking fluid, vaginal bleeding and notes good fetal movement.     /86   Wt 132 kg (290 lb)   LMP 2018 (Approximate)   BMI 48.26 kg/m²     FHT:  156 BPM    Uterine Size: 35 cm   Presentations: cephalic   Pelvic Exam:     Dilation: 3cm    Effacement: 50%    Station:  -2                 Assessment    1) Pregnancy at 38w3d  2) Fetal status reassuring   3) GBS status - negative   - consider induction Thursday or Friday     Plan    Labor warnings   [facility] [place] [facility]        Amor Michele MD   2019  2:12 PM

## 2019-01-29 ENCOUNTER — ROUTINE PRENATAL (OUTPATIENT)
Dept: OBSTETRICS AND GYNECOLOGY | Facility: CLINIC | Age: 29
End: 2019-01-29

## 2019-01-29 VITALS — WEIGHT: 293 LBS | DIASTOLIC BLOOD PRESSURE: 88 MMHG | BODY MASS INDEX: 48.92 KG/M2 | SYSTOLIC BLOOD PRESSURE: 132 MMHG

## 2019-01-29 DIAGNOSIS — Z34.83 ENCOUNTER FOR SUPERVISION OF OTHER NORMAL PREGNANCY IN THIRD TRIMESTER: Primary | ICD-10-CM

## 2019-01-29 DIAGNOSIS — O99.213 OBESITY AFFECTING PREGNANCY IN THIRD TRIMESTER: ICD-10-CM

## 2019-01-29 PROCEDURE — 0502F SUBSEQUENT PRENATAL CARE: CPT | Performed by: OBSTETRICS & GYNECOLOGY

## 2019-01-29 RX ORDER — OXYTOCIN 10 [USP'U]/ML
999 INJECTION, SOLUTION INTRAMUSCULAR; INTRAVENOUS ONCE
Status: CANCELLED | OUTPATIENT
Start: 2019-01-29

## 2019-01-29 RX ORDER — METHYLERGONOVINE MALEATE 0.2 MG/ML
200 INJECTION INTRAVENOUS ONCE AS NEEDED
Status: CANCELLED | OUTPATIENT
Start: 2019-01-29

## 2019-01-29 RX ORDER — SODIUM CHLORIDE 0.9 % (FLUSH) 0.9 %
3-10 SYRINGE (ML) INJECTION AS NEEDED
Status: CANCELLED | OUTPATIENT
Start: 2019-01-29

## 2019-01-29 RX ORDER — LIDOCAINE HYDROCHLORIDE 10 MG/ML
5 INJECTION, SOLUTION EPIDURAL; INFILTRATION; INTRACAUDAL; PERINEURAL AS NEEDED
Status: CANCELLED | OUTPATIENT
Start: 2019-01-29

## 2019-01-29 RX ORDER — OXYTOCIN 10 [USP'U]/ML
250 INJECTION, SOLUTION INTRAMUSCULAR; INTRAVENOUS CONTINUOUS
Status: CANCELLED | OUTPATIENT
Start: 2019-01-29 | End: 2019-01-29

## 2019-01-29 RX ORDER — CARBOPROST TROMETHAMINE 250 UG/ML
250 INJECTION, SOLUTION INTRAMUSCULAR AS NEEDED
Status: CANCELLED | OUTPATIENT
Start: 2019-01-29

## 2019-01-29 RX ORDER — MISOPROSTOL 100 UG/1
800 TABLET ORAL AS NEEDED
Status: CANCELLED | OUTPATIENT
Start: 2019-01-29

## 2019-01-29 RX ORDER — OXYTOCIN 10 [USP'U]/ML
2-30 INJECTION, SOLUTION INTRAMUSCULAR; INTRAVENOUS
Status: CANCELLED | OUTPATIENT
Start: 2019-01-29

## 2019-01-29 RX ORDER — OXYTOCIN 10 [USP'U]/ML
125 INJECTION, SOLUTION INTRAMUSCULAR; INTRAVENOUS CONTINUOUS PRN
Status: CANCELLED | OUTPATIENT
Start: 2019-01-29

## 2019-01-29 RX ORDER — SODIUM CHLORIDE 0.9 % (FLUSH) 0.9 %
3 SYRINGE (ML) INJECTION EVERY 12 HOURS SCHEDULED
Status: CANCELLED | OUTPATIENT
Start: 2019-01-29

## 2019-01-29 NOTE — PROGRESS NOTES
Ob follow up    Wanda Solis is a 28 y.o.  39w1d patient being seen today for her obstetrical visit. Patient reports no complaints. Fetal movement: normal.      ROS - Denies leaking fluid, vaginal bleeding and notes good fetal movement.     /88   Wt 133 kg (294 lb)   LMP 2018 (Approximate)   BMI 48.92 kg/m²     FHT:  146BPM    Uterine Size: 35 cm   Presentations: cephalic   Pelvic Exam:     Dilation: 3cm    Effacement: 50%    Station:  -2                 Assessment    1) Pregnancy at 39w1d  2) Fetal status reassuring   3) GBS status - positive     Set up induction for Friday       Plan    Labor warnings   [facility] [place] [facility]        Amor Michele MD   2019  4:04 PM

## 2019-02-01 ENCOUNTER — ANESTHESIA (OUTPATIENT)
Dept: LABOR AND DELIVERY | Facility: HOSPITAL | Age: 29
End: 2019-02-01

## 2019-02-01 ENCOUNTER — ANESTHESIA EVENT (OUTPATIENT)
Dept: LABOR AND DELIVERY | Facility: HOSPITAL | Age: 29
End: 2019-02-01

## 2019-02-01 ENCOUNTER — HOSPITAL ENCOUNTER (INPATIENT)
Dept: LABOR AND DELIVERY | Facility: HOSPITAL | Age: 29
LOS: 2 days | Discharge: HOME | End: 2019-02-03
Attending: OBSTETRICS & GYNECOLOGY | Admitting: OBSTETRICS & GYNECOLOGY

## 2019-02-01 DIAGNOSIS — Z34.83 ENCOUNTER FOR SUPERVISION OF OTHER NORMAL PREGNANCY IN THIRD TRIMESTER: ICD-10-CM

## 2019-02-01 PROBLEM — Z34.93 SUPERVISION OF NORMAL PREGNANCY IN THIRD TRIMESTER: Status: ACTIVE | Noted: 2019-02-01

## 2019-02-01 LAB
ABO GROUP BLD: NORMAL
BLD GP AB SCN SERPL QL: NEGATIVE
DEPRECATED RDW RBC AUTO: 52 FL (ref 37–54)
ERYTHROCYTE [DISTWIDTH] IN BLOOD BY AUTOMATED COUNT: 19.3 % (ref 11.7–13)
HCT VFR BLD AUTO: 27.9 % (ref 35.6–45.5)
HGB BLD-MCNC: 8.9 G/DL (ref 11.9–15.5)
MCH RBC QN AUTO: 23.6 PG (ref 26.9–32)
MCHC RBC AUTO-ENTMCNC: 31.9 G/DL (ref 32.4–36.3)
MCV RBC AUTO: 74 FL (ref 80.5–98.2)
PLATELET # BLD AUTO: 307 10*3/MM3 (ref 140–500)
PMV BLD AUTO: 9.3 FL (ref 6–12)
RBC # BLD AUTO: 3.77 10*6/MM3 (ref 3.9–5.2)
RH BLD: POSITIVE
T&S EXPIRATION DATE: NORMAL
WBC NRBC COR # BLD: 6.8 10*3/MM3 (ref 4.5–10.7)

## 2019-02-01 PROCEDURE — 85027 COMPLETE CBC AUTOMATED: CPT | Performed by: OBSTETRICS & GYNECOLOGY

## 2019-02-01 PROCEDURE — 86901 BLOOD TYPING SEROLOGIC RH(D): CPT | Performed by: OBSTETRICS & GYNECOLOGY

## 2019-02-01 PROCEDURE — 59400 OBSTETRICAL CARE: CPT | Performed by: OBSTETRICS & GYNECOLOGY

## 2019-02-01 PROCEDURE — C1755 CATHETER, INTRASPINAL: HCPCS | Performed by: ANESTHESIOLOGY

## 2019-02-01 PROCEDURE — 86850 RBC ANTIBODY SCREEN: CPT | Performed by: OBSTETRICS & GYNECOLOGY

## 2019-02-01 PROCEDURE — 88307 TISSUE EXAM BY PATHOLOGIST: CPT

## 2019-02-01 PROCEDURE — C1755 CATHETER, INTRASPINAL: HCPCS

## 2019-02-01 PROCEDURE — 86900 BLOOD TYPING SEROLOGIC ABO: CPT | Performed by: OBSTETRICS & GYNECOLOGY

## 2019-02-01 PROCEDURE — 0KQM0ZZ REPAIR PERINEUM MUSCLE, OPEN APPROACH: ICD-10-PCS | Performed by: OBSTETRICS & GYNECOLOGY

## 2019-02-01 PROCEDURE — 3E033VJ INTRODUCTION OF OTHER HORMONE INTO PERIPHERAL VEIN, PERCUTANEOUS APPROACH: ICD-10-PCS | Performed by: OBSTETRICS & GYNECOLOGY

## 2019-02-01 RX ORDER — PRENATAL VIT NO.126/IRON/FOLIC 28MG-0.8MG
1 TABLET ORAL DAILY
Status: DISCONTINUED | OUTPATIENT
Start: 2019-02-02 | End: 2019-02-03 | Stop reason: HOSPADM

## 2019-02-01 RX ORDER — OXYTOCIN-SODIUM CHLORIDE 0.9% IV SOLN 30 UNIT/500ML 30-0.9/5 UT/ML-%
999 SOLUTION INTRAVENOUS ONCE
Status: COMPLETED | OUTPATIENT
Start: 2019-02-01 | End: 2019-02-01

## 2019-02-01 RX ORDER — FAMOTIDINE 10 MG/ML
20 INJECTION, SOLUTION INTRAVENOUS ONCE AS NEEDED
Status: DISCONTINUED | OUTPATIENT
Start: 2019-02-01 | End: 2019-02-01 | Stop reason: HOSPADM

## 2019-02-01 RX ORDER — MISOPROSTOL 200 UG/1
800 TABLET ORAL AS NEEDED
Status: DISCONTINUED | OUTPATIENT
Start: 2019-02-01 | End: 2019-02-01 | Stop reason: HOSPADM

## 2019-02-01 RX ORDER — SODIUM CHLORIDE 0.9 % (FLUSH) 0.9 %
3 SYRINGE (ML) INJECTION EVERY 12 HOURS SCHEDULED
Status: DISCONTINUED | OUTPATIENT
Start: 2019-02-01 | End: 2019-02-01 | Stop reason: HOSPADM

## 2019-02-01 RX ORDER — DOCUSATE SODIUM 100 MG/1
100 CAPSULE, LIQUID FILLED ORAL 2 TIMES DAILY PRN
Status: DISCONTINUED | OUTPATIENT
Start: 2019-02-01 | End: 2019-02-03 | Stop reason: HOSPADM

## 2019-02-01 RX ORDER — OXYTOCIN-SODIUM CHLORIDE 0.9% IV SOLN 30 UNIT/500ML 30-0.9/5 UT/ML-%
125 SOLUTION INTRAVENOUS CONTINUOUS PRN
Status: COMPLETED | OUTPATIENT
Start: 2019-02-01 | End: 2019-02-01

## 2019-02-01 RX ORDER — IBUPROFEN 800 MG/1
800 TABLET ORAL EVERY 8 HOURS PRN
Status: DISCONTINUED | OUTPATIENT
Start: 2019-02-01 | End: 2019-02-03 | Stop reason: HOSPADM

## 2019-02-01 RX ORDER — OXYTOCIN-SODIUM CHLORIDE 0.9% IV SOLN 30 UNIT/500ML 30-0.9/5 UT/ML-%
250 SOLUTION INTRAVENOUS CONTINUOUS
Status: ACTIVE | OUTPATIENT
Start: 2019-02-01 | End: 2019-02-01

## 2019-02-01 RX ORDER — BISACODYL 10 MG
10 SUPPOSITORY, RECTAL RECTAL DAILY PRN
Status: DISCONTINUED | OUTPATIENT
Start: 2019-02-02 | End: 2019-02-03 | Stop reason: HOSPADM

## 2019-02-01 RX ORDER — LIDOCAINE HYDROCHLORIDE 10 MG/ML
5 INJECTION, SOLUTION EPIDURAL; INFILTRATION; INTRACAUDAL; PERINEURAL AS NEEDED
Status: DISCONTINUED | OUTPATIENT
Start: 2019-02-01 | End: 2019-02-01 | Stop reason: HOSPADM

## 2019-02-01 RX ORDER — SODIUM CHLORIDE 0.9 % (FLUSH) 0.9 %
1-10 SYRINGE (ML) INJECTION AS NEEDED
Status: DISCONTINUED | OUTPATIENT
Start: 2019-02-01 | End: 2019-02-03 | Stop reason: HOSPADM

## 2019-02-01 RX ORDER — SODIUM CHLORIDE 0.9 % (FLUSH) 0.9 %
3-10 SYRINGE (ML) INJECTION AS NEEDED
Status: DISCONTINUED | OUTPATIENT
Start: 2019-02-01 | End: 2019-02-01 | Stop reason: HOSPADM

## 2019-02-01 RX ORDER — HYDROCODONE BITARTRATE AND ACETAMINOPHEN 5; 325 MG/1; MG/1
1 TABLET ORAL EVERY 4 HOURS PRN
Status: DISCONTINUED | OUTPATIENT
Start: 2019-02-01 | End: 2019-02-03 | Stop reason: HOSPADM

## 2019-02-01 RX ORDER — EPHEDRINE SULFATE 50 MG/ML
5 INJECTION, SOLUTION INTRAVENOUS AS NEEDED
Status: DISCONTINUED | OUTPATIENT
Start: 2019-02-01 | End: 2019-02-01 | Stop reason: HOSPADM

## 2019-02-01 RX ORDER — SODIUM CHLORIDE, SODIUM LACTATE, POTASSIUM CHLORIDE, CALCIUM CHLORIDE 600; 310; 30; 20 MG/100ML; MG/100ML; MG/100ML; MG/100ML
125 INJECTION, SOLUTION INTRAVENOUS CONTINUOUS
Status: DISCONTINUED | OUTPATIENT
Start: 2019-02-01 | End: 2019-02-01

## 2019-02-01 RX ORDER — METHYLERGONOVINE MALEATE 0.2 MG/ML
200 INJECTION INTRAVENOUS ONCE AS NEEDED
Status: DISCONTINUED | OUTPATIENT
Start: 2019-02-01 | End: 2019-02-01 | Stop reason: HOSPADM

## 2019-02-01 RX ORDER — OXYTOCIN-SODIUM CHLORIDE 0.9% IV SOLN 30 UNIT/500ML 30-0.9/5 UT/ML-%
2-30 SOLUTION INTRAVENOUS
Status: DISCONTINUED | OUTPATIENT
Start: 2019-02-01 | End: 2019-02-01 | Stop reason: HOSPADM

## 2019-02-01 RX ORDER — MISOPROSTOL 200 UG/1
800 TABLET ORAL AS NEEDED
Status: DISCONTINUED | OUTPATIENT
Start: 2019-02-01 | End: 2019-02-03 | Stop reason: HOSPADM

## 2019-02-01 RX ORDER — DIPHENHYDRAMINE HCL 25 MG
25 CAPSULE ORAL EVERY 6 HOURS PRN
Status: DISCONTINUED | OUTPATIENT
Start: 2019-02-01 | End: 2019-02-01 | Stop reason: HOSPADM

## 2019-02-01 RX ORDER — ZOLPIDEM TARTRATE 5 MG/1
5 TABLET ORAL NIGHTLY PRN
Status: DISCONTINUED | OUTPATIENT
Start: 2019-02-01 | End: 2019-02-03 | Stop reason: HOSPADM

## 2019-02-01 RX ORDER — CARBOPROST TROMETHAMINE 250 UG/ML
250 INJECTION, SOLUTION INTRAMUSCULAR AS NEEDED
Status: DISCONTINUED | OUTPATIENT
Start: 2019-02-01 | End: 2019-02-01 | Stop reason: HOSPADM

## 2019-02-01 RX ORDER — PROMETHAZINE HYDROCHLORIDE 12.5 MG/1
12.5 TABLET ORAL EVERY 4 HOURS PRN
Status: DISCONTINUED | OUTPATIENT
Start: 2019-02-01 | End: 2019-02-03 | Stop reason: HOSPADM

## 2019-02-01 RX ORDER — ONDANSETRON 2 MG/ML
4 INJECTION INTRAMUSCULAR; INTRAVENOUS ONCE AS NEEDED
Status: DISCONTINUED | OUTPATIENT
Start: 2019-02-01 | End: 2019-02-01 | Stop reason: HOSPADM

## 2019-02-01 RX ADMIN — IBUPROFEN 800 MG: 800 TABLET ORAL at 19:53

## 2019-02-01 RX ADMIN — HYDROCODONE BITARTRATE AND ACETAMINOPHEN 1 TABLET: 5; 325 TABLET ORAL at 19:53

## 2019-02-01 RX ADMIN — OXYTOCIN 125 ML/HR: 10 INJECTION, SOLUTION INTRAMUSCULAR; INTRAVENOUS at 19:52

## 2019-02-01 RX ADMIN — SODIUM CHLORIDE, POTASSIUM CHLORIDE, SODIUM LACTATE AND CALCIUM CHLORIDE 999 ML/HR: 600; 310; 30; 20 INJECTION, SOLUTION INTRAVENOUS at 14:15

## 2019-02-01 RX ADMIN — SODIUM CHLORIDE, POTASSIUM CHLORIDE, SODIUM LACTATE AND CALCIUM CHLORIDE 125 ML/HR: 600; 310; 30; 20 INJECTION, SOLUTION INTRAVENOUS at 08:40

## 2019-02-01 RX ADMIN — HYDROCODONE BITARTRATE AND ACETAMINOPHEN 1 TABLET: 5; 325 TABLET ORAL at 23:51

## 2019-02-01 RX ADMIN — Medication 10 ML/HR: at 14:15

## 2019-02-01 RX ADMIN — OXYTOCIN 2 MILLI-UNITS/MIN: 10 INJECTION INTRAVENOUS at 09:36

## 2019-02-01 RX ADMIN — OXYTOCIN 999 ML/HR: 10 INJECTION, SOLUTION INTRAMUSCULAR; INTRAVENOUS at 18:31

## 2019-02-01 RX ADMIN — SODIUM CHLORIDE, POTASSIUM CHLORIDE, SODIUM LACTATE AND CALCIUM CHLORIDE 125 ML/HR: 600; 310; 30; 20 INJECTION, SOLUTION INTRAVENOUS at 18:09

## 2019-02-01 NOTE — PROGRESS NOTES
AROM performed with clear fluid  FSE placed secondary to difficulty tracing on abdomen.    Mom and baby tolerated well.   Cervix 4/90/-2, head well applied  FHR Category 1, occasional early decelerations.   Continue Pitocin augmentation

## 2019-02-01 NOTE — ANESTHESIA PREPROCEDURE EVALUATION
Anesthesia Evaluation     Patient summary reviewed and Nursing notes reviewed   no history of anesthetic complications:  NPO Solid Status: > 8 hours  NPO Liquid Status: > 2 hours           Airway   Mallampati: II  TM distance: >3 FB  Neck ROM: full  no difficulty expected  Dental - normal exam     Pulmonary - negative pulmonary ROS and normal exam   (-) COPD, asthma, not a smoker, lung cancer  Cardiovascular - negative cardio ROS and normal exam  Exercise tolerance: good (4-7 METS)    Rhythm: regular  Rate: normal    (-) hypertension, valvular problems/murmurs, past MI, CAD, dysrhythmias, angina, CHF, cardiac stents, CABG      Neuro/Psych- negative ROS  (-) seizures, TIA, CVA  GI/Hepatic/Renal/Endo    (+) morbid obesity,    (-) PUD, hepatitis, liver disease, no renal disease, diabetes, hypothyroidism, GI bleed    Musculoskeletal (-) negative ROS    Abdominal  - normal exam   Substance History - negative use     OB/GYN    (+) Pregnant,         Other - negative ROS                       Anesthesia Plan    ASA 3     epidural     Anesthetic plan, all risks, benefits, and alternatives have been provided, discussed and informed consent has been obtained with: patient.    Plan discussed with attending.

## 2019-02-01 NOTE — ANESTHESIA PROCEDURE NOTES
Labor Epidural    Pre-sedation assessment completed: 2/1/2019 2:00 PM    Patient reassessed immediately prior to procedure    Patient location during procedure: OB  Start Time: 2/1/2019 2:00 PM  Stop Time: 2/1/2019 2:10 PM  Performed By  Anesthesiologist: Dawson Velazco MD  Preanesthetic Checklist  Completed: patient identified, site marked, surgical consent, pre-op evaluation, timeout performed, IV checked, risks and benefits discussed and monitors and equipment checked  Additional Notes  Labor epidural using Arrow kit, no heme/CSF aspirated, no paraesthesias.  Test dose with 3cc 1.5% lido with epi is negative.    Prep:  Pt Position:sitting  Sterile Tech:cap, gloves, mask and sterile barrier  Prep:chlorhexidine gluconate and isopropyl alcohol  Monitoring:blood pressure monitoring, continuous pulse oximetry and EKG  Epidural Block Procedure:  Approach:midline  Guidance:landmark technique and palpation technique  Location:L3-L4  Needle Type:Tuohy  Needle Gauge:17  Loss of Resistance Medium: air  Loss of Resistance: 9cm  Cath Depth at skin:14 cm  Paresthesia: none  Aspiration:negative  Test Dose:negative  Number of Attempts: 2  Post Assessment:  Dressing:occlusive dressing applied and secured with tape  Pt Tolerance:patient tolerated the procedure well with no apparent complications  Complications:no

## 2019-02-01 NOTE — PROGRESS NOTES
Ob Note    28 y.o.  @ 39w4d   Undergoing induction of labor - obesity   Doing well as she transitions into active lbor     Temp:  [99 °F (37.2 °C)-99.4 °F (37.4 °C)] 99.4 °F (37.4 °C)  Heart Rate:  [] 100  Resp:  [18] 18  BP: (110-157)/(60-78) 127/65    Gen - WN, WD female in NAD  Abd EFW 7.5 #   Cx 5-6/80/-1 on my exam   Ext Mild edema, no evidence of DVT     FHT - class 1  [place]  -q 2-3 min     A/P   1) Pregnancy at 39w4d  2) Transition to active labor   3) FSR  4) GBS negative    Amor Michele MD  2019  3:04 PM

## 2019-02-01 NOTE — H&P
[facility]  Obstetric History and Physical    Chief Complaint   Patient presents with   • Scheduled Induction     term elective induction. +FM, denies vaginl bleeding or fluid        Subjective     Patient is a 28 y.o. female  currently at 39w4d, who presents for IOL, OB history significant for Hemoglobin C trait.    This pregnancy has been complicated by flu last week - treated with Tamiflu.  Completed Rx earlier this week..  Her previous obstetric/gynecological history is notable for two prior SVDs      Prenatal Information:  Prenatal Results     Initial Prenatal Labs     Test Value Reference Range Date Time    Hemoglobin 9.6 g/dL 11.9 - 15.5 g/dL 18 0713    Hematocrit 28.0 % 35.6 - 45.5 % 18 0713    Platelets 248 10*3/mm3 140 - 500 10*3/mm3 19 1858    Rubella IgG 2.87 index Immune >0.99 index 18 1339    Hepatitis B SAg Negative  Negative 18 1339    Hepatitis C Ab <0.1 s/co ratio 0.0 - 0.9 s/co ratio 18 1339    RPR Non Reactive  Non Reactive 18 1339    ABO O   18 1339    Rh Positive   18 1339    Antibody Screen Negative  Negative 18 1339    HIV Non Reactive  Non Reactive 18 1339    Urine Culture        Gonorrhea Negative  Negative 18 1343    Chlamydia Negative  Negative 18 1343    TSH              2nd and 3rd Trimester     Test Value Reference Range Date Time    Hemoglobin (repeated) 8.6 g/dL 11.9 - 15.5 g/dL 19 1858    Hematocrit (repeated) 27.0 % 35.6 - 45.5 % 19 1858     mg/dL 65 - 139 mg/dL 18 1520    Antibody Screen (repeated)        GTT Fasting 79 mg/dL mg/dL 10/04/17 1250    GTT 1 Hr 144 mg/dL 40 - 300 mg/dL 10/04/17 1250    GTT 2 Hr 107 mg/dL 40 - 300 mg/dL 10/04/17 1250    GTT 3 Hr 94 mg/dL 40 - 300 mg/dL 10/04/17 1250    Group B Strep Negative  Negative 19 1515          Drug Screening     Test Value Reference Range Date Time    Amphetamine Screen        Barbiturate Screen         Benzodiazepine Screen        Methadone Screen        Phencyclidine Screen        Opiates Screen        THC Screen        Cocaine Screen        Propoxyphene Screen        Buprenorphine Screen        Methamphetamine Screen        Oxycodone Screen        Tricyclic Antidepressants Screen              Other (Risk screening)     Test Value Reference Range Date Time    Varicella IgG        Parvovirus IgG        CMV IgG        Cystic Fibrosis        Hemoglobin electrophoresis        NIPT        MSAFP-4        AFP (for NTD only)                  External Prenatal Results     Pregnancy Outside Results - Transcribed From Office Records - See Scanned Records For Details     Test Value Date Time    Hgb 8.6 g/dL 01/22/19 1858    Hct 27.0 % 01/22/19 1858    ABO O  07/05/18 1339    Rh Positive  07/05/18 1339    Antibody Screen Negative  07/05/18 1339    Glucose Fasting GTT 79 mg/dL 10/04/17 1250    Glucose Tolerance Test 1 hour 144 mg/dL 10/04/17 1250    Glucose Tolerance Test 3 hour 94 mg/dL 10/04/17 1250    Gonorrhea (discrete) Negative  07/05/18 1343    Chlamydia (discrete) Negative  07/05/18 1343    RPR Non Reactive  07/05/18 1339    VDRL       Syphilis Antibody       Rubella 2.87 index 07/05/18 1339    HBsAg Negative  07/05/18 1339    Herpes Simplex Virus PCR       Herpes Simplex VIrus Culture       HIV Non Reactive  07/05/18 1339    Hep C RNA Quant PCR       Hep C Antibody <0.1 s/co ratio 07/05/18 1339    AFP       Group B Strep Negative  01/03/19 1515    GBS Susceptibility to Clindamycin       GBS Susceptibility to Erythromycin       Fetal Fibronectin       Genetic Testing, Maternal Blood             Drug Screening     Test Value Date Time    Urine Drug Screen       Amphetamine Screen       Barbiturate Screen       Benzodiazepine Screen       Methadone Screen       Phencyclidine Screen       Opiates Screen       THC Screen       Cocaine Screen       Propoxyphene Screen       Buprenorphine Screen       Methamphetamine Screen        Oxycodone Screen       Tricyclic Antidepressants Screen                    Past OB History:     Obstetric History       T2      L2     SAB1   TAB0   Ectopic0   Molar0   Multiple0   Live Births2       # Outcome Date GA Lbr Francisco/2nd Weight Sex Delivery Anes PTL Lv   5 Current            4 Term 12/15/17 39w2d 06:15 / 00:28 3385 g (7 lb 7.4 oz) M Vag-Spont EPI N DUANE      Name: CHARLES METCALF      Apgar1:  7                Apgar5: 8   3 Term 09 40w0d  3402 g (7 lb 8 oz) F Vag-Spont EPI N DUANE   2 AB            1 SAB                   Past Medical History: Past Medical History:   Diagnosis Date   • Abnormal Pap smear of cervix     follow up wnl    • Urogenital trichomoniasis       Past Surgical History Past Surgical History:   Procedure Laterality Date   • DILATATION AND CURETTAGE     • INCISION AND DRAINAGE PERIRECTAL ABSCESS Left 2018    Procedure: INCISION AND DRAINAGE OF LEFT BREAST;  Surgeon: Parvez Long MD;  Location: [facility];  Service: General   • WISDOM TOOTH EXTRACTION        Family History: Family History   Problem Relation Age of Onset   • Breast cancer Neg Hx    • Ovarian cancer Neg Hx    • Uterine cancer Neg Hx    • Colon cancer Neg Hx    • Deep vein thrombosis Neg Hx    • Pulmonary embolism Neg Hx       Social History:  reports that  has never smoked. she has never used smokeless tobacco.   reports that she does not drink alcohol.   reports that she does not use drugs.        General ROS: Pertinent items are noted in HPI    Objective       Vital Signs Range for the last 24 hours  Temperature:     Temp Source:     BP:     Pulse:     Respirations:     SPO2:     O2 Amount (l/min):     O2 Devices     Weight:       Physical Examination: General appearance - alert, well appearing, and in no distress  Mental status - alert, oriented to person, place, and time  Eyes - sclera anicteric, left eye normal, right eye normal  Chest - no tachypnea, retractions or  cyanosis  Heart - normal rate and regular rhythm  Abdomen - gravid, soft, nontender,   Pelvic - see cervical exam  Back exam - full range of motion, no tenderness, palpable spasm or pain on motion  Neurological - alert, oriented, normal speech, no focal findings or movement disorder noted  Musculoskeletal - no joint tenderness, deformity or swelling  Extremities - pedal edema 2 +  Skin - normal coloration and turgor, no rashes, no suspicious skin lesions noted    Presentation: Vertex   Cervix: Exam by:     Dilation:     Effacement:     Station:         Fetal Heart Rate Assessment   Method: Fetal HR Assessment Method: external   Beats/min:     Baseline:     Varibility:     Accels:     Decels:     Tracing Category:       Uterine Assessment   Method:     Frequency (min):     Ctx Count in 10 min:     Duration:     Intensity:     Intensity by IUPC:     Resting Tone:     Resting Tone by IUPC:     [place] Units:       Laboratory Results:   Lab Results   Component Value Date    WBC 7.82 01/22/2019    HGB 8.6 (L) 01/22/2019    HCT 27.0 (L) 01/22/2019    MCV 73.8 (L) 01/22/2019     01/22/2019    Assessment/Plan       Supervision of normal pregnancy in third trimester  Obesity      Assessment:  1.  Intrauterine pregnancy at 39w4d weeks gestation with reactive fetal status.    2.  GBS status: Negative    Plan:  1. IOL planned with Pitocin  2. Plan of care has been reviewed with patient.  Risks, benefits of treatment plan have been discussed.  All questions have been answered.      Eli Lowry MD  2/1/2019  9:03 AM

## 2019-02-02 PROBLEM — D50.8 IRON DEFICIENCY ANEMIA SECONDARY TO INADEQUATE DIETARY IRON INTAKE: Status: ACTIVE | Noted: 2019-02-02

## 2019-02-02 LAB
BASOPHILS # BLD AUTO: 0.03 10*3/MM3 (ref 0–0.2)
BASOPHILS NFR BLD AUTO: 0.3 % (ref 0–1.5)
DEPRECATED RDW RBC AUTO: 51.3 FL (ref 37–54)
EOSINOPHIL # BLD AUTO: 0.12 10*3/MM3 (ref 0–0.7)
EOSINOPHIL NFR BLD AUTO: 1.2 % (ref 0.3–6.2)
ERYTHROCYTE [DISTWIDTH] IN BLOOD BY AUTOMATED COUNT: 19.4 % (ref 11.7–13)
HCT VFR BLD AUTO: 23 % (ref 35.6–45.5)
HGB BLD-MCNC: 7.5 G/DL (ref 11.9–15.5)
IMM GRANULOCYTES # BLD AUTO: 0.03 10*3/MM3 (ref 0–0.03)
IMM GRANULOCYTES NFR BLD AUTO: 0.3 % (ref 0–0.5)
LYMPHOCYTES # BLD AUTO: 2.58 10*3/MM3 (ref 0.9–4.8)
LYMPHOCYTES NFR BLD AUTO: 24.8 % (ref 19.6–45.3)
MCH RBC QN AUTO: 23.4 PG (ref 26.9–32)
MCHC RBC AUTO-ENTMCNC: 32.6 G/DL (ref 32.4–36.3)
MCV RBC AUTO: 71.9 FL (ref 80.5–98.2)
MONOCYTES # BLD AUTO: 0.75 10*3/MM3 (ref 0.2–1.2)
MONOCYTES NFR BLD AUTO: 7.2 % (ref 5–12)
NEUTROPHILS # BLD AUTO: 6.94 10*3/MM3 (ref 1.9–8.1)
NEUTROPHILS NFR BLD AUTO: 66.5 % (ref 42.7–76)
PLATELET # BLD AUTO: 290 10*3/MM3 (ref 140–500)
PMV BLD AUTO: 9.4 FL (ref 6–12)
RBC # BLD AUTO: 3.2 10*6/MM3 (ref 3.9–5.2)
WBC NRBC COR # BLD: 10.42 10*3/MM3 (ref 4.5–10.7)

## 2019-02-02 PROCEDURE — 85025 COMPLETE CBC W/AUTO DIFF WBC: CPT | Performed by: OBSTETRICS & GYNECOLOGY

## 2019-02-02 PROCEDURE — 99024 POSTOP FOLLOW-UP VISIT: CPT | Performed by: OBSTETRICS & GYNECOLOGY

## 2019-02-02 RX ORDER — FERROUS SULFATE 325(65) MG
325 TABLET ORAL 2 TIMES DAILY WITH MEALS
Status: DISCONTINUED | OUTPATIENT
Start: 2019-02-02 | End: 2019-02-03 | Stop reason: HOSPADM

## 2019-02-02 RX ADMIN — DOCUSATE SODIUM 100 MG: 100 CAPSULE, LIQUID FILLED ORAL at 23:07

## 2019-02-02 RX ADMIN — HYDROCODONE BITARTRATE AND ACETAMINOPHEN 1 TABLET: 5; 325 TABLET ORAL at 06:03

## 2019-02-02 RX ADMIN — DOCUSATE SODIUM 100 MG: 100 CAPSULE, LIQUID FILLED ORAL at 06:03

## 2019-02-02 RX ADMIN — IBUPROFEN 800 MG: 800 TABLET ORAL at 06:03

## 2019-02-02 RX ADMIN — FERROUS SULFATE TAB 325 MG (65 MG ELEMENTAL FE) 325 MG: 325 (65 FE) TAB at 18:27

## 2019-02-02 RX ADMIN — HYDROCODONE BITARTRATE AND ACETAMINOPHEN 1 TABLET: 5; 325 TABLET ORAL at 23:07

## 2019-02-02 RX ADMIN — HYDROCODONE BITARTRATE AND ACETAMINOPHEN 1 TABLET: 5; 325 TABLET ORAL at 15:59

## 2019-02-02 RX ADMIN — IBUPROFEN 800 MG: 800 TABLET ORAL at 15:59

## 2019-02-02 NOTE — L&D DELIVERY NOTE
[facility]  Vaginal Delivery Note    Delivery    Predelivery Diagnoses: 1) Pregnancy at 39w4d                                          2) Obesity in pregnancy                     Postdelivery Diagnoses 1) Pregnancy at 39w4d                                            2) Obesity in pregnancy     Attending :  Amor Michele MD     Procedure : Obstetrical controlled vaginal delivery    Delivery Narrative :     Wanda Solis is a 28 y.o. year old  @ 39w4d estimated gestational age. She presented to L&D for induction.   Her prenatal care was with Dr. Michele and was complicated by breast abscess and obesity.   Once on L&D her labor progressed well along the labor curve with the aid of pitocin, amniotomy, and epidural interventions.  Once she was to the second stage, I was called for delivery.     Upon my arrival she was prepped in the lithotomy position, and was doing well in her pushing efforts.  With delivery of the fetal head in OA presentation, bulb suction was performed, then using a murray type maneuver, pressure was placed along the posterior aspect of the anterior shoulder and it delivered without difficulty. No nuchal cord noted.  The infant showed good cry and tone and was placed upon the Mother's abdomen.   After a thirty second delay, I then clamped the cord and it was cut by the father of the baby.  Care of the infant was then turned over to the delivery team.  Cord blood was obtained and then using gentle pressure the placenta was delivered spontaneous/intact and noted to have 3 vessel cord.  At that point I undertook inspection of the perineum and vulva and I repaired second degree perineal using 3-0 Monocryl in standard fashion with good hemostatic and cosmetic results.   This repair was difficult as it was stellate and went in several directions requiring 3 sutures and pressure to get the bleeding under control.      After repair of all lacerations, the area was noted to be hemostatic and  all sponge and needle counts were correct. A vaginal exam and rectal exam showed no issues with retained equipment or suture in an abnormal place.      There was one family member(s) noted to be in the room with this patient.            Delivery: Vaginal, Spontaneous     YOB: 2021    Time of Birth: 6:28 PM      Anesthesia: Epidural             EBL: 450 cc          Infant    Findings: male  infant     Infant observations: Weight: No birth weight on file.   Length:    in  Observations/Comments:  scale 4      Apgars: 8   @ 1 minute /    9   @ 5 minutes       Complications  none    Disposition  Mother to Mother Baby/Postpartum  in stable condition currently.  Baby to remains with mom  in stable condition currently.      Amor Michele MD  02/01/19  7:06 PM

## 2019-02-02 NOTE — PLAN OF CARE
Problem: Patient Care Overview  Goal: Plan of Care Review  Outcome: Ongoing (interventions implemented as appropriate)   02/02/19 1004   Coping/Psychosocial   Plan of Care Reviewed With patient   Plan of Care Review   Progress improving   OTHER   Outcome Summary assessment wnl; pain meds prn; bottlefeeding infant; encouraged ambulation in halls; plan for discharge tomorrow     Goal: Individualization and Mutuality  Outcome: Ongoing (interventions implemented as appropriate)    Goal: Discharge Needs Assessment  Outcome: Ongoing (interventions implemented as appropriate)   02/02/19 1004   Discharge Needs Assessment   Readmission Within the Last 30 Days no previous admission in last 30 days   Concerns to be Addressed no discharge needs identified   Patient/Family Anticipates Transition to home with family   Patient/Family Anticipated Services at Transition none   Transportation Concerns car, none   Transportation Anticipated car, drives self;family or friend will provide   Anticipated Changes Related to Illness none   Equipment Needed After Discharge none   Disability   Equipment Currently Used at Home none       Problem: Postpartum (Vaginal Delivery) (Adult,Obstetrics,Pediatric)  Goal: Signs and Symptoms of Listed Potential Problems Will be Absent, Minimized or Managed (Postpartum)  Outcome: Ongoing (interventions implemented as appropriate)   02/02/19 1004   Goal/Outcome Evaluation   Problems Assessed (Postpartum Vaginal Delivery) all   Problems Present (Postpartum Vag Deliv) pain

## 2019-02-02 NOTE — PLAN OF CARE
Problem: Patient Care Overview  Goal: Plan of Care Review  Outcome: Ongoing (interventions implemented as appropriate)   02/01/19 1919   Coping/Psychosocial   Plan of Care Reviewed With patient   Plan of Care Review   Progress improving     Goal: Individualization and Mutuality  Outcome: Ongoing (interventions implemented as appropriate)   02/01/19 1504 02/01/19 1919   Individualization   Patient Specific Preferences bottlefeeding, kangaroo care --    Patient Specific Goals (Include Timeframe) --  pain management   Patient Specific Interventions --  pain management   Mutuality/Individual Preferences   What Anxieties, Fears, Concerns, or Questions Do You Have About Your Care? --  pain   What Information Would Help Us Give You More Personalized Care? none --    How Would You and/or Your Support Person Like to Participate in Your Care? --  care of baby   Mutuality/Individual Preferences   How to Address Anxieties/Fears education, update on POC --      Goal: Discharge Needs Assessment  Outcome: Ongoing (interventions implemented as appropriate)   02/01/19 1919   Discharge Needs Assessment   Readmission Within the Last 30 Days no previous admission in last 30 days   Concerns to be Addressed no discharge needs identified   Patient/Family Anticipates Transition to home;home with family   Patient/Family Anticipated Services at Transition none   Transportation Concerns car, none   Transportation Anticipated car, drives self;family or friend will provide   Anticipated Changes Related to Illness none   Equipment Needed After Discharge none   Disability   Equipment Currently Used at Home none       Problem: Labor (Cervical Ripen, Induct, Augment) (Adult,Obstetrics,Pediatric)  Goal: Signs and Symptoms of Listed Potential Problems Will be Absent, Minimized or Managed (Labor)  Outcome: Outcome(s) achieved Date Met: 02/01/19 02/01/19 1919   Goal/Outcome Evaluation   Problems Assessed (Labor) all   Problems Present (Labor) none        Problem: Fall Risk,  (Adult,Obstetrics,Pediatric)  Goal: Identify Related Risk Factors and Signs and Symptoms  Outcome: Ongoing (interventions implemented as appropriate)   19   Fall Risk,  (Adult,Obstetrics,Pediatric)   Related Risk Factors (Fall Risk, ) medical devices;medication side effects;obesity;pregnancy weight gain;prolonged bed rest;regional anesthesia   Signs and Symptoms (Fall Risk, ) presence of fall risk factors;increased risk of  fall/drop     Goal: Absence of Maternal Fall  Outcome: Ongoing (interventions implemented as appropriate)   19   Fall Risk,  (Adult,Obstetrics,Pediatric)   Absence of Maternal Fall making progress toward outcome     Goal: Absence of [place] Fall/Drop  Outcome: Ongoing (interventions implemented as appropriate)   19   Fall Risk,  (Adult,Obstetrics,Pediatric)   Absence of  Fall/Drop making progress toward outcome       Problem: Anesthesia/Analgesia, Neuraxial (Obstetrics)  Goal: Signs and Symptoms of Listed Potential Problems Will be Absent, Minimized or Managed (Anesthesia/Analgesia, Neuraxial)  Outcome: Ongoing (interventions implemented as appropriate)      Problem: Skin Injury Risk (Adult)  Goal: Identify Related Risk Factors and Signs and Symptoms  Outcome: Ongoing (interventions implemented as appropriate)   19   Skin Injury Risk (Adult)   Related Risk Factors (Skin Injury Risk) body weight extremes;medical devices;medication;mobility impaired     Goal: Skin Health and Integrity  Outcome: Ongoing (interventions implemented as appropriate)   19   Skin Injury Risk (Adult)   Skin Health and Integrity making progress toward outcome

## 2019-02-02 NOTE — PROGRESS NOTES
"[facility]  Vaginal Delivery Progress Note    Subjective   Subjective  Postpartum Day 1: Vaginal Delivery    The patient feels well.  Her pain is well controlled with nonsteroidal anti-inflammatory drugs.   She is ambulating well.  Patient describes her bleeding as thin lochia.    Breastfeeding: declines.    Objective     Objective   Vital Signs Range for the last 24 hours  Temperature: Temp:  [97.8 °F (36.6 °C)-100.4 °F (38 °C)] 97.8 °F (36.6 °C)   Temp Source: Temp src: Oral   BP: BP: (106-173)/() 153/85   Pulse: Heart Rate:  [] 98   Respirations: Resp:  [15-20] 16   SPO2: SpO2:  [98 %-100 %] 99 %   O2 Amount (l/min):     O2 Devices Device (Oxygen Therapy): room air   Weight:       Admit Height:  Height: 165.1 cm (65\")    Physical Exam:  General:  no acute distresss.  Abdomen: abdomen is soft without significant tenderness, masses, organomegaly or guarding. Fundus: appropriate, firm, non tender  Extremities: normal, atraumatic, no cyanosis, and trace edema.       Lab results reviewed:  Yes   Hgb 7.5  Pre-del 8.9  Rubella:  No results found for: RUBELLAIGGIN Nurse Transcribed from prenatal record --    Rubella Antibodies, IgG   Date Value Ref Range Status   07/05/2018 2.87 Immune >0.99 index Final     Comment:                                     Non-immune       <0.90                                  Equivocal  0.90 - 0.99                                  Immune           >0.99       Rh Status:    Rh Factor   Date Value Ref Range Status   07/05/2018 Positive  Final     Comment:     Please note: Prior records for this patient's ABO / Rh type are not  available for additional verification.       RH type   Date Value Ref Range Status   02/01/2019 Positive  Final     Immunizations:   There is no immunization history on file for this patient.    Assessment/Plan   Assessment & Plan    Normal vaginal delivery    Hemoglobin C trait (CMS/HCC)    Obesity affecting pregnancy in third trimester    Supervision of " normal pregnancy in third trimester    Iron deficiency anemia secondary to inadequate dietary iron intake      Wanda Solis is Day 1  post-partum  Vaginal, Spontaneous    .      Plan:  Continue current care , add Fe, desires circ.  Home in am.      Matt Zaman MD  2/2/2019  11:09 AM

## 2019-02-02 NOTE — PLAN OF CARE
Problem: Patient Care Overview  Goal: Plan of Care Review  Outcome: Ongoing (interventions implemented as appropriate)   19 0438   Coping/Psychosocial   Plan of Care Reviewed With patient;significant other   Plan of Care Review   Progress improving   OTHER   Outcome Summary VSS, now afebrile. Amb to BR x2. Voiding. Med for pain. Light bleeding. S.O. at bedside, snoring kept pt up most of the night.      Goal: Individualization and Mutuality  Outcome: Ongoing (interventions implemented as appropriate)    Goal: Discharge Needs Assessment  Outcome: Ongoing (interventions implemented as appropriate)      Problem: Fall Risk,  (Adult,Obstetrics,Pediatric)  Goal: Identify Related Risk Factors and Signs and Symptoms  Outcome: Ongoing (interventions implemented as appropriate)    Goal: Absence of Maternal Fall  Outcome: Ongoing (interventions implemented as appropriate)    Goal: Absence of [place] Fall/Drop  Outcome: Ongoing (interventions implemented as appropriate)      Problem: Anesthesia/Analgesia, Neuraxial (Obstetrics)  Goal: Signs and Symptoms of Listed Potential Problems Will be Absent, Minimized or Managed (Anesthesia/Analgesia, Neuraxial)  Outcome: Outcome(s) achieved Date Met: 19      Problem: Skin Injury Risk (Adult)  Goal: Identify Related Risk Factors and Signs and Symptoms  Outcome: Ongoing (interventions implemented as appropriate)    Goal: Skin Health and Integrity  Outcome: Ongoing (interventions implemented as appropriate)      Problem: Postpartum (Vaginal Delivery) (Adult,Obstetrics,Pediatric)  Goal: Signs and Symptoms of Listed Potential Problems Will be Absent, Minimized or Managed (Postpartum)  Outcome: Ongoing (interventions implemented as appropriate)

## 2019-02-03 VITALS
HEIGHT: 65 IN | RESPIRATION RATE: 16 BRPM | WEIGHT: 293 LBS | OXYGEN SATURATION: 99 % | DIASTOLIC BLOOD PRESSURE: 89 MMHG | HEART RATE: 87 BPM | BODY MASS INDEX: 48.82 KG/M2 | TEMPERATURE: 97.9 F | SYSTOLIC BLOOD PRESSURE: 150 MMHG

## 2019-02-03 RX ORDER — IBUPROFEN 800 MG/1
800 TABLET ORAL EVERY 8 HOURS PRN
Qty: 50 TABLET | Refills: 3 | Status: SHIPPED | OUTPATIENT
Start: 2019-02-03 | End: 2019-03-18

## 2019-02-03 RX ORDER — PRENATAL VIT NO.126/IRON/FOLIC 28MG-0.8MG
1 TABLET ORAL DAILY
Qty: 30 TABLET | Refills: 11 | Status: SHIPPED | OUTPATIENT
Start: 2019-02-04 | End: 2019-03-18

## 2019-02-03 RX ORDER — HYDROCODONE BITARTRATE AND ACETAMINOPHEN 5; 325 MG/1; MG/1
1 TABLET ORAL EVERY 4 HOURS PRN
Qty: 2 TABLET | Refills: 0 | Status: SHIPPED | OUTPATIENT
Start: 2019-02-03 | End: 2019-02-10

## 2019-02-03 RX ORDER — FERROUS SULFATE 325(65) MG
325 TABLET ORAL 2 TIMES DAILY WITH MEALS
Qty: 30 TABLET | Refills: 1 | Status: SHIPPED | OUTPATIENT
Start: 2019-02-03 | End: 2020-08-21

## 2019-02-03 RX ADMIN — IBUPROFEN 800 MG: 800 TABLET ORAL at 06:12

## 2019-02-03 RX ADMIN — FERROUS SULFATE TAB 325 MG (65 MG ELEMENTAL FE) 325 MG: 325 (65 FE) TAB at 08:23

## 2019-02-03 RX ADMIN — HYDROCODONE BITARTRATE AND ACETAMINOPHEN 1 TABLET: 5; 325 TABLET ORAL at 06:12

## 2019-02-03 NOTE — PROGRESS NOTES
Postpartum Progress Note      Status post Vaginal Delivery: Doing well postoperatively.     1) postpartum care immediately following delivery : Doing well, discharge home.   2) anemia, postpartum - starting iron - Most pregnancy loss as initial Hg 8.9 grams pre delivery     Rh status: O positive   Rubella: immune  Gender: Male     Subjective     Postpartum Day 2: Vaginal delivery    The patient feels well. The patient denies emotional concerns. Pain is well controlled with current medications. The baby is well. The patient is ambulating well. The patient is tolerating a normal diet.     Objective     Vital signs in last 24 hours:  Temp:  [97.9 °F (36.6 °C)-98.7 °F (37.1 °C)] 97.9 °F (36.6 °C)  Heart Rate:  [87-97] 87  Resp:  [16-18] 16  BP: (119-150)/(77-89) 150/89      General:    alert, appears stated age and cooperative   Abdomen:  Soft, Non-tender    Lochia:  appropriate   Uterine Fundus:   firm   Ext    Edema 1+   DVT Evaluation:  No evidence of DVT seen on physical exam.     Lab Results   Component Value Date    WBC 10.42 02/02/2019    HGB 7.5 (L) 02/02/2019    HCT 23.0 (L) 02/02/2019    MCV 71.9 (L) 02/02/2019     02/02/2019       Amor Michele MD  2/3/2019  10:37 AM

## 2019-02-03 NOTE — PLAN OF CARE
Problem: Patient Care Overview  Goal: Plan of Care Review  Outcome: Ongoing (interventions implemented as appropriate)   02/03/19 0529   Coping/Psychosocial   Plan of Care Reviewed With patient   Plan of Care Review   Progress improving   OTHER   Outcome Summary VSS. Med for pain PRN. Amb enc. Up in room. Min bleeding. Baby in nursery most of night. Pt slept off and on.      Goal: Individualization and Mutuality  Outcome: Ongoing (interventions implemented as appropriate)    Goal: Discharge Needs Assessment  Outcome: Ongoing (interventions implemented as appropriate)      Problem: Postpartum (Vaginal Delivery) (Adult,Obstetrics,Pediatric)  Goal: Signs and Symptoms of Listed Potential Problems Will be Absent, Minimized or Managed (Postpartum)  Outcome: Ongoing (interventions implemented as appropriate)

## 2019-02-03 NOTE — DISCHARGE SUMMARY
Date of Discharge:  2/3/2019    Discharge Diagnosis:   Patient Active Problem List   Diagnosis   • Hemoglobin C trait (CMS/HCC)   • Positive GBS test   • Obesity affecting pregnancy in third trimester   • Obesity in pregnancy, antepartum, third trimester   • Postpartum care and examination immediately after delivery   • Near syncope   • Mastitis   • Influenza A   • Influenza A   • Supervision of normal pregnancy in third trimester   • Normal vaginal delivery   • Iron deficiency anemia secondary to inadequate dietary iron intake       Presenting Problem/History of Present Illness  Encounter for supervision of other normal pregnancy in third trimester [Z34.83]  Obesity affecting pregnancy in third trimester [O99.213]  Supervision of normal pregnancy in third trimester [Z34.93]       Hospital Course  Patient is a 28 y.o. female  39w4d presented with scheduled induction.  For events surrounding her delivery please see delivery/op note.  Her postpartum course was uneventful and today PPD#2, she is ready for discharge.  She meets all milestones and criteria for discharge and instructions were reviewed and she voiced understanding.     Procedures Performed  Vaginal delivery        Consults:   Consults     Date and Time Order Name Status Description    2019 OB/GYN (on-call MD unless specified) Completed           Pertinent Test Results: Hg of 7.5 grams    Condition on Discharge:  Stable     Discharge Disposition  Home or Self Care    Discharge Medications     Discharge Medications      New Medications      Instructions Start Date   ferrous sulfate 325 (65 FE) MG tablet   325 mg, Oral, 2 Times Daily With Meals      HYDROcodone-acetaminophen 5-325 MG per tablet  Commonly known as:  NORCO   1 tablet, Oral, Every 4 Hours PRN      ibuprofen 800 MG tablet  Commonly known as:  ADVIL,MOTRIN   800 mg, Oral, Every 8 Hours PRN      prenatal (CLASSIC) vitamin 28-0.8 MG tablet tablet   1 tablet, Oral, Daily              Discharge Diet:   Diet Instructions     Advance Diet As Tolerated            Activity at Discharge:   Activity Instructions     Pelvic Rest            Follow-up Appointments  No future appointments.  Additional Instructions for the Follow-ups that You Need to Schedule     Discharge Follow-up with Specialty: Dr. Michele; 6 Weeks   As directed      Specialty:  Dr. Michele    Follow Up:  6 Weeks               Test Results Pending at Discharge       Amor Michele MD  02/03/19  10:43 AM

## 2019-03-18 ENCOUNTER — POSTPARTUM VISIT (OUTPATIENT)
Dept: OBSTETRICS AND GYNECOLOGY | Facility: CLINIC | Age: 29
End: 2019-03-18

## 2019-03-18 VITALS
DIASTOLIC BLOOD PRESSURE: 81 MMHG | WEIGHT: 267 LBS | SYSTOLIC BLOOD PRESSURE: 116 MMHG | HEART RATE: 82 BPM | BODY MASS INDEX: 44.48 KG/M2 | HEIGHT: 65 IN

## 2019-03-18 DIAGNOSIS — Z30.011 OCP (ORAL CONTRACEPTIVE PILLS) INITIATION: ICD-10-CM

## 2019-03-18 PROCEDURE — 0503F POSTPARTUM CARE VISIT: CPT | Performed by: OBSTETRICS & GYNECOLOGY

## 2019-03-18 RX ORDER — CLINDAMYCIN PHOSPHATE 10 UG/ML
LOTION TOPICAL
COMMUNITY
Start: 2019-03-13 | End: 2020-08-21

## 2019-03-18 RX ORDER — NORGESTIMATE AND ETHINYL ESTRADIOL 0.25-0.035
1 KIT ORAL DAILY
Qty: 1 PACKAGE | Refills: 12 | Status: SHIPPED | OUTPATIENT
Start: 2019-03-18 | End: 2020-08-21

## 2019-03-18 RX ORDER — DOXYCYCLINE HYCLATE 100 MG
TABLET ORAL
COMMUNITY
Start: 2019-03-13 | End: 2020-08-21

## 2019-03-18 NOTE — PROGRESS NOTES
"Here for Postpartum Check     HPI    29 y.o.  - Delivered 2019, now at 6 weeks postpartum for follow up.   Complications of pregnancy/delivery/postpartum : none  Breastfeeding/Bottlefeeding : Bottle  Baby Blues: denies  Contraception : OCP's  Last pap : 2018 NL HPV neg   Complaints: none    Review of Systems   Constitutional: Negative for chills and fever.   Gastrointestinal: Negative for abdominal pain.   Genitourinary: Negative for dysuria, menstrual problem, pelvic pain, vaginal bleeding and vaginal discharge.   All other systems reviewed and are negative.      /81   Pulse 82   Ht 165.1 cm (65\")   Wt 121 kg (267 lb)   Breastfeeding? No   BMI 44.43 kg/m²     Physical Exam   Constitutional: She is oriented to person, place, and time. She appears well-developed and well-nourished. No distress. She is obese.  HENT:   Head: Normocephalic and atraumatic.   Eyes: Conjunctivae are normal. Right eye exhibits no discharge. Left eye exhibits no discharge.   Neck: Normal range of motion. Neck supple. No thyromegaly present.   Cardiovascular: Normal rate, regular rhythm and normal heart sounds.   No murmur heard.  Pulmonary/Chest: Effort normal and breath sounds normal. No respiratory distress. Right breast exhibits no inverted nipple, no mass and no nipple discharge. Left breast exhibits no inverted nipple, no mass and no nipple discharge.   Abdominal: Soft. Bowel sounds are normal. She exhibits no distension. There is no tenderness.   Genitourinary: Vagina normal and cervix normal. Pelvic exam was performed with patient supine. There is no lesion or Bartholin's cyst on the right labia. There is no lesion or Bartholin's cyst on the left labia. Uterus is anteverted. Uterus is not deviated, enlarged (limited by habitus ), fixed or exhibiting a mass.   Cervix is not parous. Cervix does not exhibit motion tenderness. Right adnexum displays no mass, no tenderness and no fullness. Left adnexum " displays no mass, no tenderness and no fullness. No bleeding in the vagina. No vaginal discharge found.   Musculoskeletal: Normal range of motion. She exhibits no edema.   Lymphadenopathy:     She has no cervical adenopathy.        Right: No inguinal adenopathy present.        Left: No inguinal adenopathy present.   Neurological: She is alert and oriented to person, place, and time.   Skin: Skin is warm and dry. No rash noted.   Psychiatric: She has a normal mood and affect. Her behavior is normal. Judgment and thought content normal.         Assessment/plan   Problems Addressed this Visit     None      Visit Diagnoses     Postpartum state    -  Primary    OCP (oral contraceptive pills) initiation            1) postpartum   Doing well, released from restrictions.   Recommendation : 150 min/week of moderate intensity aerobic activity     2) OCP   How to start   Common side effects  Life threatening complications   Efficiency     Amor Michele MD   3/18/2019  12:42 PM

## 2020-08-21 ENCOUNTER — OFFICE VISIT (OUTPATIENT)
Dept: OBSTETRICS AND GYNECOLOGY | Facility: CLINIC | Age: 30
End: 2020-08-21

## 2020-08-21 VITALS
WEIGHT: 282 LBS | SYSTOLIC BLOOD PRESSURE: 140 MMHG | DIASTOLIC BLOOD PRESSURE: 92 MMHG | BODY MASS INDEX: 46.98 KG/M2 | HEIGHT: 65 IN | HEART RATE: 85 BPM

## 2020-08-21 DIAGNOSIS — Z01.419 ENCOUNTER FOR GYNECOLOGICAL EXAMINATION WITHOUT ABNORMAL FINDING: Primary | ICD-10-CM

## 2020-08-21 DIAGNOSIS — N64.3 GALACTORRHEA NOT ASSOCIATED WITH CHILDBIRTH: ICD-10-CM

## 2020-08-21 DIAGNOSIS — L73.2 VULVAL HIDRADENITIS SUPPURATIVA: ICD-10-CM

## 2020-08-21 PROCEDURE — 99395 PREV VISIT EST AGE 18-39: CPT | Performed by: OBSTETRICS & GYNECOLOGY

## 2020-08-21 RX ORDER — DOXYCYCLINE 100 MG/1
100 CAPSULE ORAL 2 TIMES DAILY
Qty: 20 CAPSULE | Refills: 0 | Status: SHIPPED | OUTPATIENT
Start: 2020-08-21 | End: 2020-08-31

## 2020-08-21 RX ORDER — FERROUS SULFATE 325(65) MG
325 TABLET ORAL DAILY
COMMUNITY
Start: 2020-02-14

## 2020-08-21 NOTE — PROGRESS NOTES
GYN Annual Exam     CC- Here for annual exam.     Wanda Solis is a 30 y.o. female who presents for annual well woman exam. Periods are regular every 28-30 days, lasting 5 days. Dysmenorrhea:mild, occurring first 1-2 days of flow. Cyclic symptoms include none. No intermenstrual bleeding, spotting, or discharge.  Pt c/o lactation from breast, s/p delivery 1.5 years ago.  Pt diagnosed with HS by dermatologist, has some areas in her vaginal area.     OB History        5    Para   3    Term   3            AB   2    Living   3       SAB   1    TAB        Ectopic        Molar        Multiple   0    Live Births   3                Current contraception: none  History of abnormal Pap smear: Yes, no treatment required  Family history of uterine, colon or ovarian cancer: no  History of abnormal mammogram: no  Family history of breast cancer: no  Last Pap : 2018 NL HPV neg     Past Medical History:   Diagnosis Date   • Abnormal Pap smear of cervix     follow up wnl    • Urogenital trichomoniasis        Past Surgical History:   Procedure Laterality Date   • DILATATION AND CURETTAGE     • INCISION AND DRAINAGE PERIRECTAL ABSCESS Left 2018    Procedure: INCISION AND DRAINAGE OF LEFT BREAST;  Surgeon: Parvez Long MD;  Location: [facility];  Service: General   • WISDOM TOOTH EXTRACTION           Current Outpatient Medications:   •  ferrous sulfate 325 (65 FE) MG tablet, Take 325 mg by mouth Daily., Disp: , Rfl:     No Known Allergies    Social History     Tobacco Use   • Smoking status: Never Smoker   • Smokeless tobacco: Never Used   Substance Use Topics   • Alcohol use: No   • Drug use: No       Family History   Problem Relation Age of Onset   • Breast cancer Neg Hx    • Ovarian cancer Neg Hx    • Uterine cancer Neg Hx    • Colon cancer Neg Hx    • Deep vein thrombosis Neg Hx    • Pulmonary embolism Neg Hx        Review of Systems   Constitutional: Negative for chills and fever.  "  Gastrointestinal: Negative for abdominal pain, constipation and diarrhea.   Genitourinary: Positive for breast discharge. Negative for menstrual problem, pelvic pain, vaginal bleeding and vaginal discharge.   All other systems reviewed and are negative.      /92   Pulse 85   Ht 165.1 cm (65\")   Wt 128 kg (282 lb)   Breastfeeding No   BMI 46.93 kg/m²     Physical Exam   Constitutional: She is oriented to person, place, and time. She appears well-developed and well-nourished. No distress. She is obese.  HENT:   Head: Normocephalic and atraumatic.   Eyes: Conjunctivae are normal. Right eye exhibits no discharge. Left eye exhibits no discharge.   Neck: Normal range of motion. Neck supple. No thyromegaly present.   Cardiovascular: Normal rate, regular rhythm and normal heart sounds.   No murmur heard.  Pulmonary/Chest: Effort normal and breath sounds normal. No respiratory distress. Right breast exhibits no inverted nipple, no mass and no nipple discharge. Left breast exhibits no inverted nipple, no mass and no nipple discharge.   Abdominal: Soft. Bowel sounds are normal. She exhibits no distension. There is no tenderness.   Genitourinary: Vagina normal and cervix normal. Pelvic exam was performed with patient supine. There is no lesion or Bartholin's cyst on the right labia. There is no lesion or Bartholin's cyst on the left labia. Uterus is anteverted. Uterus is not deviated, enlarged (limited by habitus ), fixed or exhibiting a mass. Cervix does not exhibit motion tenderness or friability. Right adnexum displays no mass, no tenderness and no fullness. Left adnexum displays no mass, no tenderness and no fullness. No bleeding in the vagina. No vaginal discharge found.   Musculoskeletal: Normal range of motion. She exhibits no edema.   Lymphadenopathy:     She has no cervical adenopathy.        Right: No inguinal adenopathy present.        Left: No inguinal adenopathy present.   Neurological: She is alert " and oriented to person, place, and time.   Skin: Skin is warm and dry. No rash noted.   Psychiatric: She has a normal mood and affect. Her behavior is normal. Judgment and thought content normal.            Assessment     1) GYN annual well woman exam.   2) Vulvar hidradenitis   PRN doxycyline discussed  3) Galactorrhea   Check PRL and TSH  If normal, follow symptoms   PCP did MMG and was normal      Plan     1) Breast Health - Clinical breast exam yearly, Self breast awareness monthly  2) Pap - up to date   3) Smoking status- non-smoke r  4) Activity recommends - Adult 150-300 min/week of multi-component physical activities that include balance training, aerobic and physical strengthening.    Avoidance of distracted driving issues (texts, phone calls).   5) Follow up prn and one year.       Amor Michele MD   8/21/2020  11:28

## 2020-11-15 ENCOUNTER — APPOINTMENT (OUTPATIENT)
Dept: GENERAL RADIOLOGY | Facility: HOSPITAL | Age: 30
End: 2020-11-15

## 2020-11-15 ENCOUNTER — HOSPITAL ENCOUNTER (EMERGENCY)
Facility: HOSPITAL | Age: 30
Discharge: HOME | End: 2020-11-15
Attending: EMERGENCY MEDICINE | Admitting: EMERGENCY MEDICINE

## 2020-11-15 VITALS
RESPIRATION RATE: 18 BRPM | TEMPERATURE: 98.1 F | DIASTOLIC BLOOD PRESSURE: 96 MMHG | OXYGEN SATURATION: 100 % | SYSTOLIC BLOOD PRESSURE: 142 MMHG | HEART RATE: 87 BPM

## 2020-11-15 DIAGNOSIS — Z20.822 SUSPECTED COVID-19 VIRUS INFECTION: Primary | ICD-10-CM

## 2020-11-15 PROCEDURE — C9803 HOPD COVID-19 SPEC COLLECT: HCPCS | Performed by: EMERGENCY MEDICINE

## 2020-11-15 PROCEDURE — 99283 EMERGENCY DEPT VISIT LOW MDM: CPT

## 2020-11-15 PROCEDURE — U0004 COV-19 TEST NON-CDC HGH THRU: HCPCS | Performed by: EMERGENCY MEDICINE

## 2020-11-15 PROCEDURE — 71045 X-RAY EXAM CHEST 1 VIEW: CPT

## 2020-11-15 NOTE — ED TRIAGE NOTES
Pt reports loss of taste and smell since Wednesday reports increased weakness, works at hospital taking care of COVID patient. Mask placed on patient in first look triage. Triage staff wearing masks.

## 2020-11-16 LAB — SARS-COV-2 RNA RESP QL NAA+PROBE: DETECTED

## 2020-11-16 NOTE — ED PROVIDER NOTES
EMERGENCY DEPARTMENT ENCOUNTER    Room Number:  12/12  PCP: Marzena Schmitz APRN  Historian: Patient  History Limited By: Nothing      HPI  Chief Complaint: Feeling ill  Context: Wanda Solis is a 30 y.o. female who presents to the ED c/o feeling ill.  Patient states she has had some chills and recent loss of smell and taste.  Patient has had some body aches.  Patient has had no documented fever.  Had no cough or congestion.  Had one episode of diarrhea.  Has had no burning with urination.  No abdominal pain.  Patient works here in dietary.  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   • Positive GBS test 11/20/2017   • Obesity affecting pregnancy in third trimester 12/14/2017   • Obesity in pregnancy, antepartum, third trimester 12/14/2017   • Postpartum care and examination immediately after delivery 12/15/2017   • Near syncope 06/27/2018   • Mastitis 09/01/2018   • Influenza A 01/22/2019   • Influenza A 01/23/2019   • Supervision of normal pregnancy in third trimester 02/01/2019   • Normal vaginal delivery 02/01/2019   • Iron deficiency anemia secondary to inadequate dietary iron intake 02/02/2019     Resolved Ambulatory Problems     Diagnosis Date Noted   • Breast abscess during pregnancy, antepartum 09/01/2018     Past Medical History:   Diagnosis Date   • Abnormal Pap smear of cervix    • Urogenital trichomoniasis          PAST SURGICAL HISTORY  Past Surgical History:   Procedure Laterality Date   • DILATATION AND CURETTAGE     • INCISION AND DRAINAGE PERIRECTAL ABSCESS Left 9/1/2018    Procedure: INCISION AND DRAINAGE  OF LEFT BREAST;  Surgeon: Parvez Long MD;  Location: [facility] OR;  Service: General   • WISDOM TOOTH EXTRACTION           FAMILY HISTORY  Family History   Problem Relation Age of Onset   • No Known Problems Father    • No Known Problems Mother    • Breast cancer Neg Hx    • Ovarian cancer Neg Hx    • Uterine cancer Neg Hx    • Colon cancer Neg Hx    • Deep vein thrombosis Neg Hx    • Pulmonary embolism Neg Hx          SOCIAL HISTORY  Social History     Socioeconomic History   • Marital status: Single     Spouse name: Not on file   • Number of children: Not on file   • Years of education: Not on file   • Highest education level: Not on file   Tobacco Use   • Smoking status: Never Smoker   • Smokeless tobacco: Never Used   Substance and Sexual Activity   • Alcohol use: No   • Drug use: No   • Sexual activity: Not Currently     Partners: Male     Birth control/protection: None         ALLERGIES  Patient has no known allergies.        REVIEW OF SYSTEMS  Review of Systems   Constitutional: Positive for chills and fatigue. Negative for fever.   HENT: Negative for sore throat.    Eyes: Negative.    Respiratory: Negative for cough and shortness of breath.    Cardiovascular: Negative for chest pain.   Gastrointestinal: Positive for diarrhea. Negative for abdominal pain and vomiting.   Genitourinary: Negative for dysuria.   Musculoskeletal: Negative for neck pain.   Skin: Negative for rash.   Allergic/Immunologic: Negative.    Neurological: Positive for weakness. Negative for numbness and headaches.   Hematological: Negative.    Psychiatric/Behavioral: Negative.    All other systems reviewed and are negative.           PHYSICAL EXAM  ED Triage Vitals   Temp Heart Rate Resp BP SpO2   11/15/20 1745 11/15/20 1744 11/15/20 1744 11/15/20 1835 11/15/20 1744   98.1 °F (36.7 °C) 115 18 129/90 99 %      Temp src Heart Rate Source Patient Position BP Location FiO2 (%)   11/15/20 1745 11/15/20 1744 -- -- --   Tympanic Monitor           Physical Exam   Constitutional: She is oriented to person, place, and time. No distress.   HENT:   Head: Normocephalic and atraumatic.   Eyes: Pupils are equal, round, and reactive to light. EOM are normal.   Neck: Normal range of motion. Neck supple.   Cardiovascular: Normal rate, regular rhythm and normal heart sounds.   Pulmonary/Chest: Effort normal and breath sounds normal. No respiratory distress.   Abdominal: Soft. There is no abdominal tenderness. There is no rebound and no guarding.   Musculoskeletal: Normal range of motion.         General: No edema.   Neurological: She is alert and oriented to person, place, and time. She has normal sensation and normal strength.   Skin: Skin is warm and dry. No rash noted.   Psychiatric: Mood and affect normal.   Nursing note and vitals reviewed.    Patient was wearing a face mask when I entered the room and they continued to wear a mask throughout their stay in the ED.  I wore PPE, including gloves, face mask with shield or face mask with goggles whenever I was in the room with patient.       LAB RESULTS  No results found for this or any previous visit (from the past 24 hour(s)).    Ordered the above labs and reviewed the results.        RADIOLOGY  XR Chest 1 View   Final Result   No acute findings.       This report was finalized on 11/15/2020 8:15 PM by Dr. Bea García M.D.               Ordered the above noted radiological studies. Reviewed by me in PACS.            PROCEDURES  Procedures            MEDICATIONS GIVEN IN ER  Medications - No data to display          PROGRESS AND CONSULTS  ED Course as of Nov 15 2149   Sun Nov 15, 2020   2021 20:21 EST  Patient presents for loss of smell or taste, chills, Covid symptoms.  Patient's chest x-ray is normal and patient's pulse oximetry is normal.  Patient will be swabbed and will be sent home to quarantine until negative or if positive for 10 days afterwards    [SL]      ED Course User Index  [SL] Woody  MD Deangelo           MEDICAL DECISION MAKING      MDM  Number of Diagnoses or Management Options  Suspected COVID-19 virus infection:      Amount and/or Complexity of Data Reviewed  Tests in the radiology section of CPT®: reviewed and ordered (Chest x-ray negative)               DIAGNOSIS  Final diagnoses:   Suspected COVID-19 virus infection           DISPOSITION  DISCHARGE    Patient discharged in stable condition.    Reviewed implications of results, diagnosis, meds, responsibility to follow up, warning signs and symptoms of possible worsening, potential complications and reasons to return to ER, including worsening shortness of breath.    Patient/Family voiced understanding of above instructions.    Discussed plan for discharge, as there is no emergent indication for admission. Patient referred to primary care provider for BP management due to today's BP. Pt/family is agreeable and understands need for follow up and repeat testing.  Pt is aware that discharge does not mean that nothing is wrong but it indicates no emergency is present that requires admission and they must continue care with follow-up as given below or physician of their choice.     FOLLOW-UP  Marzena Schmitz, APRN  [address] [id number] [phone]    Schedule an appointment as soon as possible for a visit           Medication List      No changes were made to your prescriptions during this visit.             Latest Documented Vital Signs:  As of 21:49 EST  BP- 142/96 HR- 87 Temp- 98.1 °F (36.7 °C) (Tympanic) O2 sat- 100%                         Deangelo Mckay MD  11/15/20 1736

## 2020-11-19 ENCOUNTER — EPISODE CHANGES (OUTPATIENT)
Dept: CASE MANAGEMENT | Facility: OTHER | Age: 30
End: 2020-11-19

## 2021-04-16 ENCOUNTER — BULK ORDERING (OUTPATIENT)
Dept: CASE MANAGEMENT | Facility: OTHER | Age: 31
End: 2021-04-16

## 2021-04-16 DIAGNOSIS — Z23 IMMUNIZATION DUE: ICD-10-CM

## 2021-07-24 ENCOUNTER — HOSPITAL ENCOUNTER (EMERGENCY)
Facility: HOSPITAL | Age: 31
Discharge: HOME | End: 2021-07-24
Attending: EMERGENCY MEDICINE | Admitting: EMERGENCY MEDICINE

## 2021-07-24 VITALS
HEART RATE: 84 BPM | OXYGEN SATURATION: 99 % | TEMPERATURE: 97.4 F | BODY MASS INDEX: 44.55 KG/M2 | SYSTOLIC BLOOD PRESSURE: 117 MMHG | WEIGHT: 267.4 LBS | DIASTOLIC BLOOD PRESSURE: 86 MMHG | HEIGHT: 65 IN | RESPIRATION RATE: 15 BRPM

## 2021-07-24 DIAGNOSIS — N61.1 ABSCESS OF BREAST, RIGHT: Primary | ICD-10-CM

## 2021-07-24 PROCEDURE — 87205 SMEAR GRAM STAIN: CPT | Performed by: EMERGENCY MEDICINE

## 2021-07-24 PROCEDURE — 87077 CULTURE AEROBIC IDENTIFY: CPT | Performed by: EMERGENCY MEDICINE

## 2021-07-24 PROCEDURE — 87186 SC STD MICRODIL/AGAR DIL: CPT | Performed by: EMERGENCY MEDICINE

## 2021-07-24 PROCEDURE — 87070 CULTURE OTHR SPECIMN AEROBIC: CPT | Performed by: EMERGENCY MEDICINE

## 2021-07-24 PROCEDURE — 87147 CULTURE TYPE IMMUNOLOGIC: CPT | Performed by: EMERGENCY MEDICINE

## 2021-07-24 PROCEDURE — 99283 EMERGENCY DEPT VISIT LOW MDM: CPT

## 2021-07-24 RX ORDER — OXYCODONE HYDROCHLORIDE AND ACETAMINOPHEN 5; 325 MG/1; MG/1
2 TABLET ORAL ONCE
Status: COMPLETED | OUTPATIENT
Start: 2021-07-24 | End: 2021-07-24

## 2021-07-24 RX ORDER — SULFAMETHOXAZOLE AND TRIMETHOPRIM 800; 160 MG/1; MG/1
1 TABLET ORAL 2 TIMES DAILY
Qty: 20 TABLET | Refills: 0 | Status: SHIPPED | OUTPATIENT
Start: 2021-07-24 | End: 2021-10-12

## 2021-07-24 RX ORDER — LIDOCAINE HYDROCHLORIDE AND EPINEPHRINE 10; 10 MG/ML; UG/ML
10 INJECTION, SOLUTION INFILTRATION; PERINEURAL ONCE
Status: COMPLETED | OUTPATIENT
Start: 2021-07-24 | End: 2021-07-24

## 2021-07-24 RX ORDER — OXYCODONE AND ACETAMINOPHEN 7.5; 325 MG/1; MG/1
1-2 TABLET ORAL EVERY 6 HOURS PRN
Qty: 15 TABLET | Refills: 0 | Status: SHIPPED | OUTPATIENT
Start: 2021-07-24 | End: 2021-10-12

## 2021-07-24 RX ORDER — OXYCODONE AND ACETAMINOPHEN 7.5; 325 MG/1; MG/1
1-2 TABLET ORAL EVERY 6 HOURS PRN
Qty: 15 TABLET | Refills: 0 | Status: SHIPPED | OUTPATIENT
Start: 2021-07-24 | End: 2021-07-24 | Stop reason: SDUPTHER

## 2021-07-24 RX ORDER — SULFAMETHOXAZOLE AND TRIMETHOPRIM 800; 160 MG/1; MG/1
1 TABLET ORAL 2 TIMES DAILY
Qty: 20 TABLET | Refills: 0 | Status: SHIPPED | OUTPATIENT
Start: 2021-07-24 | End: 2021-07-24 | Stop reason: SDUPTHER

## 2021-07-24 RX ADMIN — OXYCODONE AND ACETAMINOPHEN 2 TABLET: 5; 325 TABLET ORAL at 20:51

## 2021-07-24 RX ADMIN — LIDOCAINE HYDROCHLORIDE,EPINEPHRINE BITARTRATE 10 ML: 10; .01 INJECTION, SOLUTION INFILTRATION; PERINEURAL at 20:03

## 2021-07-24 NOTE — ED TRIAGE NOTES
Pt masked on arrival, staff masked    Pt reports swollen/painful area under rt breast, started about a week ago

## 2021-07-24 NOTE — ED PROVIDER NOTES
EMERGENCY DEPARTMENT ENCOUNTER    Room Number:  24/24  Date of encounter:  7/24/2021  PCP: Marzena Schmitz APRN  Historian: Patient     I used full protective equipment while examining this patient.  This includes face mask, gloves and protective eyewear.  I washed my hands before entering the room and immediately upon leaving the room      HPI:  Chief Complaint: Right breast infection  A complete HPI/ROS/PMH/PSH/SH/FH are unobtainable due to: None    Context: Wanda Solis is a 31 y.o. female who presents to the ED c/o right breast infection.  Patient has had pain in the right breast ongoing for about 5 days.  Yesterday she noticed some swelling and rash.  Pain is mild at rest but becomes moderate to severe with palpation or movement.  Patient denies any recent fever.  Patient did have a history of a prior breast infection and was seen by Dr. Grimes in the past for this.      MEDICAL RECORD REVIEW  I reviewed prior medical records including most recent ED visit from November of last year for suspected Covid infection.    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   • Positive GBS test 11/20/2017   • Obesity affecting pregnancy in third trimester 12/14/2017   • Obesity in pregnancy, antepartum, third trimester 12/14/2017   • Postpartum care and examination immediately after delivery 12/15/2017   • Near syncope 06/27/2018   • Mastitis 09/01/2018   • Influenza A 01/22/2019   • Influenza A 01/23/2019   • Supervision of normal pregnancy in third trimester 02/01/2019   • Normal vaginal delivery 02/01/2019   • Iron deficiency anemia secondary to inadequate dietary iron intake 02/02/2019     Resolved Ambulatory Problems     Diagnosis Date Noted   • Breast abscess during pregnancy, antepartum 09/01/2018     Past Medical History:   Diagnosis Date   • Abnormal Pap smear of cervix    • Urogenital trichomoniasis          PAST SURGICAL HISTORY  Past  Surgical History:   Procedure Laterality Date   • DILATATION AND CURETTAGE     • INCISION AND DRAINAGE PERIRECTAL ABSCESS Left 9/1/2018    Procedure: INCISION AND DRAINAGE OF LEFT BREAST;  Surgeon: Parvez Long MD;  Location: [facility] OR;  Service: General   • WISDOM TOOTH EXTRACTION           FAMILY HISTORY  Family History   Problem Relation Age of Onset   • No Known Problems Father    • No Known Problems Mother    • Breast cancer Neg Hx    • Ovarian cancer Neg Hx    • Uterine cancer Neg Hx    • Colon cancer Neg Hx    • Deep vein thrombosis Neg Hx    • Pulmonary embolism Neg Hx          SOCIAL HISTORY  Social History     Socioeconomic History   • Marital status: Single     Spouse name: Not on file   • Number of children: Not on file   • Years of education: Not on file   • Highest education level: Not on file   Tobacco Use   • Smoking status: Never Smoker   • Smokeless tobacco: Never Used   Substance and Sexual Activity   • Alcohol use: No   • Drug use: No   • Sexual activity: Not Currently     Partners: Male     Birth control/protection: None         ALLERGIES  Patient has no known allergies.       REVIEW OF SYSTEMS  Review of Systems   Constitutional: Negative for fever.   Respiratory: Negative for shortness of breath.    Cardiovascular: Negative for chest pain.           PHYSICAL EXAM    I have reviewed the triage vital signs and nursing notes.    ED Triage Vitals [07/24/21 1843]   Temp Heart Rate Resp BP SpO2   97.3 °F (36.3 °C) 110 18 -- 99 %      Temp src Heart Rate Source Patient Position BP Location FiO2 (%)   -- -- -- -- --       Physical Exam  GENERAL: Well-appearing female in no obvious distress.  Triage vitals reviewed notable for pulse of 110.  Temperature 97.3  HENT: nares patent  EYES: no scleral icterus  CV: regular rhythm, regular rate-no murmur  RESPIRATORY: normal effort, clear to auscultation bilaterally  ABDOMEN: soft, nontender to palpation  MUSCULOSKELETAL: no deformity  NEURO:  "Strength, sensation, and coordination are grossly intact.  Speech and mentation are unremarkable  SKIN: Examination of the right breast reveals an abscess at the lateral breast at approximately 9 o'clock position.  This abscess is spontaneously draining significant amount of yellow purulent matter.  Abscess size is roughly 3 x 3 cm although it is difficult to assess the size it is somewhat deep within the breast tissue.      LAB RESULTS  No results found for this or any previous visit (from the past 24 hour(s)).    Ordered the above labs and independently reviewed the results.      RADIOLOGY  No Radiology Exams Resulted Within Past 24 Hours    I ordered the above noted radiological studies. Reviewed by me and discussed with radiologist.  See dictation for official radiology interpretation.      PROCEDURES  Incision & Drainage    Date/Time: 7/24/2021 8:28 PM  Performed by: José Olivia MD  Authorized by: José Olivia MD     Consent:     Consent obtained:  Emergent situation  Location:     Type:  Abscess    Location:  Trunk    Trunk location:  R breast  Pre-procedure details:     Skin preparation:  Betadine  Anesthesia (see MAR for exact dosages):     Anesthesia method:  Local infiltration    Local anesthetic:  Lidocaine 1% WITH epi  Procedure type:     Complexity:  Complex  Procedure details:     Incision types:  Stab incision    Incision depth:  Subcutaneous    Scalpel blade:  11    Wound management:  Probed and deloculated, irrigated with saline and extensive cleaning    Drainage:  Purulent    Drainage amount:  Copious    Wound treatment:  Drain placed    Packing materials:  1/4 in iodoform gauze    Amount 1/4\" iodoform:  6 inches  Post-procedure details:     Patient tolerance of procedure:  Tolerated well, no immediate complications          MEDICATIONS GIVEN IN ER    Medications   lidocaine 1% - EPINEPHrine 1:156044 (XYLOCAINE W/EPI) 1 %-1:003501 injection 10 mL (10 mL Injection Given by Other 7/24/21 " 2003)         PROGRESS, DATA ANALYSIS, CONSULTS, AND MEDICAL DECISION MAKING    All labs have been independently reviewed by me.  All radiology studies have been reviewed by me and discussed with radiologist dictating the report.   EKG's independently viewed and interpreted by me.  Discussion below represents my analysis of pertinent findings related to patient's condition, differential diagnosis, treatment plan and final disposition.      ED Course as of Jul 24 2034   Sat Jul 24, 2021 2029 Please see procedure note for I&D.  I was able to retrieve a large amount of purulent material and irrigated the cavity thoroughly with normal saline.  I did pack the wound with at least 6 inches of iodoform gauze.  I gave patient Percocet 2 p.o. to help with pain.  We will send patient home on a prescription for Bactrim and have her follow-up with Dr. Michael Grimes.    [DB]      ED Course User Index  [DB] José Olivia MD       AS OF 20:34 EDT VITALS:    BP - 119/76  HR - 88  TEMP - 97.3 °F (36.3 °C)  O2 SATS - 99%      DIAGNOSIS  Final diagnoses:   Abscess of breast, right         DISPOSITION  DISCHARGE    Patient discharged in stable condition.    Reviewed implications of results, diagnosis, meds, responsibility to follow up, warning signs and symptoms of possible worsening, potential complications and reasons to return to ER, including increased pain, fever or as needed.    Patient/Family voiced understanding of above instructions.    Discussed plan for discharge, as there is no emergent indication for admission. Patient referred to primary care provider for BP management due to today's BP. Pt/family is agreeable and understands need for follow up and repeat testing.  Pt is aware that discharge does not mean that nothing is wrong but it indicates no emergency is present that requires admission and they must continue care with follow-up as given below or physician of their choice.     FOLLOW-UP  Carl Grimes,  MD  [address] [address] [phone]      Call for Appointment         Medication List      New Prescriptions    oxyCODONE-acetaminophen 7.5-325 MG per tablet  Commonly known as: Percocet  Take 1-2 tablets by mouth Every 6 (Six) Hours As Needed for Severe Pain .     sulfamethoxazole-trimethoprim 800-160 MG per tablet  Commonly known as: Bactrim DS  Take 1 tablet by mouth 2 (Two) Times a Day.           Where to Get Your Medications      These medications were sent to [facility] [address] [place] [address] ([facility]), [place] - [address] [facility] [phone]  - [phone] FX [address] [facility] ([facility]) [place] [address]    Phone: [phone]   · oxyCODONE-acetaminophen 7.5-325 MG per tablet  · sulfamethoxazole-trimethoprim 800-160 MG per tablet                José Olivia MD  07/24/21 9854

## 2021-07-25 NOTE — ED NOTES
Patient chart opened by this RN to verify medication with [facility].      Siri Peterson RN  07/25/21 1162

## 2021-07-25 NOTE — DISCHARGE INSTRUCTIONS
Please change the packing in the wound once a day.  Try to get about 3 inches of packing in until the abscess gets too small to take in further packing.

## 2021-07-28 LAB
BACTERIA SPEC AEROBE CULT: ABNORMAL
BACTERIA SPEC AEROBE CULT: ABNORMAL
GRAM STN SPEC: ABNORMAL

## 2021-09-22 ENCOUNTER — IMMUNIZATION (OUTPATIENT)
Dept: VACCINE CLINIC | Facility: HOSPITAL | Age: 31
End: 2021-09-22

## 2021-09-22 PROCEDURE — 0001A: CPT | Performed by: INTERNAL MEDICINE

## 2021-09-22 PROCEDURE — 91300 HC SARSCOV02 VAC 30MCG/0.3ML IM: CPT | Performed by: INTERNAL MEDICINE

## 2021-10-12 ENCOUNTER — OFFICE VISIT (OUTPATIENT)
Dept: OBSTETRICS AND GYNECOLOGY | Facility: CLINIC | Age: 31
End: 2021-10-12

## 2021-10-12 VITALS
HEIGHT: 65 IN | BODY MASS INDEX: 46.82 KG/M2 | HEART RATE: 91 BPM | DIASTOLIC BLOOD PRESSURE: 83 MMHG | WEIGHT: 281 LBS | SYSTOLIC BLOOD PRESSURE: 123 MMHG

## 2021-10-12 DIAGNOSIS — L73.2 VULVAL HIDRADENITIS SUPPURATIVA: ICD-10-CM

## 2021-10-12 DIAGNOSIS — Z01.419 ENCOUNTER FOR GYNECOLOGICAL EXAMINATION WITHOUT ABNORMAL FINDING: Primary | ICD-10-CM

## 2021-10-12 DIAGNOSIS — Z30.011 OCP (ORAL CONTRACEPTIVE PILLS) INITIATION: ICD-10-CM

## 2021-10-12 PROCEDURE — 99395 PREV VISIT EST AGE 18-39: CPT | Performed by: OBSTETRICS & GYNECOLOGY

## 2021-10-12 PROCEDURE — 2014F MENTAL STATUS ASSESS: CPT | Performed by: OBSTETRICS & GYNECOLOGY

## 2021-10-12 PROCEDURE — 3008F BODY MASS INDEX DOCD: CPT | Performed by: OBSTETRICS & GYNECOLOGY

## 2021-10-12 RX ORDER — DOXYCYCLINE 100 MG/1
CAPSULE ORAL
COMMUNITY
Start: 2021-09-03

## 2021-10-12 RX ORDER — BENZOYL PEROXIDE 100 MG/ML
LIQUID TOPICAL
COMMUNITY
Start: 2021-09-03

## 2021-10-12 RX ORDER — NORGESTIMATE AND ETHINYL ESTRADIOL 0.25-0.035
1 KIT ORAL DAILY
Qty: 84 TABLET | Refills: 3 | Status: SHIPPED | OUTPATIENT
Start: 2021-10-12 | End: 2023-03-10

## 2021-10-12 RX ORDER — SODIUM FLUORIDE 5 MG/G
CREAM DENTAL
COMMUNITY
Start: 2021-07-10 | End: 2023-03-10

## 2021-10-12 RX ORDER — CLINDAMYCIN PHOSPHATE 11.9 MG/ML
SOLUTION TOPICAL
COMMUNITY
Start: 2021-09-03 | End: 2023-03-10

## 2021-10-12 RX ORDER — ERGOCALCIFEROL 1.25 MG/1
CAPSULE ORAL
COMMUNITY
Start: 2021-09-05

## 2021-10-12 NOTE — PROGRESS NOTES
GYN Annual Exam     CC- Here for annual exam.     Wanad Solis is a 31 y.o. female who presents for annual well woman exam. Periods are regular every 28-30 days, lasting 5 days. Dysmenorrhea:mild, occurring first 1-2 days of flow. Cyclic symptoms include none. No intermenstrual bleeding, spotting, or discharge.  Pt is having issues with hidradenitis, under her breast, arms, and groin area.     OB History        5    Para   3    Term   3            AB   2    Living   3       SAB   1    IAB        Ectopic        Molar        Multiple   0    Live Births   3                Current contraception: none  History of abnormal Pap smear: yes - no treatment required  Family history of uterine, colon or ovarian cancer: no  History of abnormal mammogram: no  Family history of breast cancer: no  Last Pap : 2018 NL HPV neg     Past Medical History:   Diagnosis Date   • Abnormal Pap smear of cervix     follow up wnl    • Urogenital trichomoniasis        Past Surgical History:   Procedure Laterality Date   • DILATATION AND CURETTAGE     • INCISION AND DRAINAGE PERIRECTAL ABSCESS Left 2018    Procedure: INCISION AND DRAINAGE OF LEFT BREAST;  Surgeon: Parvez Long MD;  Location: [facility];  Service: General   • WISDOM TOOTH EXTRACTION           Current Outpatient Medications:   •  benzoyl peroxide 10 % external wash, USE TO WASH AFFECTED AREAS ONCE DAILY AS DIRECTED, Disp: , Rfl:   •  clindamycin (CLEOCIN T) 1 % external solution, RUB ONTO AFFECTED AREAS OF THE UNDER ARMS TWICE DAILY AFTER WASHING WITH BENZOYL PEROXIDE WASH, Disp: , Rfl:   •  doxycycline (MONODOX) 100 MG capsule, TAKE 1 CAPSULE BY MOUTH TWICE DAILY WITH FOOD. AVOID THE SUN, Disp: , Rfl:   •  ferrous sulfate 325 (65 FE) MG tablet, Take 325 mg by mouth Daily., Disp: , Rfl:   •  norgestimate-ethinyl estradiol (MonoNessa) 0.25-35 MG-MCG per tablet, Take 1 tablet by mouth Daily., Disp: 84 tablet, Rfl: 3  •  Sodium Fluoride 5000 Plus  "1.1 % cream, USE TO BRUSH TEETH AT NIGHT. DO NOT RINSE, Disp: , Rfl:   •  vitamin D (ERGOCALCIFEROL) 1.25 MG (00887 UT) capsule capsule, , Disp: , Rfl:     No Known Allergies    Social History     Tobacco Use   • Smoking status: Never Smoker   • Smokeless tobacco: Never Used   Substance Use Topics   • Alcohol use: No   • Drug use: No       Family History   Problem Relation Age of Onset   • No Known Problems Father    • No Known Problems Mother    • Breast cancer Neg Hx    • Ovarian cancer Neg Hx    • Uterine cancer Neg Hx    • Colon cancer Neg Hx    • Deep vein thrombosis Neg Hx    • Pulmonary embolism Neg Hx        Review of Systems   Constitutional: Negative for chills and fever.   Gastrointestinal: Negative for abdominal pain, constipation and diarrhea.   Genitourinary: Negative for menstrual problem, pelvic pain, vaginal bleeding and vaginal discharge.   All other systems reviewed and are negative.      /83   Pulse 91   Ht 165.1 cm (65\")   Wt 127 kg (281 lb)   Breastfeeding No   BMI 46.76 kg/m²     Physical Exam  Constitutional:       General: She is not in acute distress.     Appearance: She is well-developed. She is obese.   Genitourinary:      Vulva normal.      Right Labia: No lesions or Bartholin's cyst.     Left Labia: No lesions or Bartholin's cyst.     No inguinal adenopathy present in the right or left side.     No vaginal discharge or bleeding.        Right Adnexa: not tender, not full and no mass present.     Left Adnexa: not tender, not full and no mass present.     No cervical motion tenderness or friability.      Uterus is not enlarged (exam limited by habitus ) or tender.      No uterine mass detected.     Uterus is anteverted.   Breasts:      Right: No inverted nipple, mass or nipple discharge.      Left: No inverted nipple, mass or nipple discharge.       HENT:      Head: Normocephalic and atraumatic.      Nose: Nose normal.   Eyes:      Conjunctiva/sclera: Conjunctivae normal.      " Pupils: Pupils are equal, round, and reactive to light.   Neck:      Thyroid: No thyromegaly.   Cardiovascular:      Rate and Rhythm: Normal rate and regular rhythm.      Heart sounds: Normal heart sounds. No murmur heard.      Pulmonary:      Effort: Pulmonary effort is normal. No respiratory distress.      Breath sounds: Normal breath sounds.   Abdominal:      General: Abdomen is flat. There is no distension.      Palpations: Abdomen is soft.      Tenderness: There is no abdominal tenderness.   Musculoskeletal:         General: No deformity. Normal range of motion.      Cervical back: Normal range of motion and neck supple.      Right lower leg: No edema.      Left lower leg: No edema.   Lymphadenopathy:      Lower Body: No right inguinal adenopathy. No left inguinal adenopathy.   Neurological:      Mental Status: She is alert and oriented to person, place, and time.   Skin:     General: Skin is warm and dry.      Findings: Lesion (multiple HS lesions under breasts, axilla and vulva ) present. No erythema.   Psychiatric:         Behavior: Behavior normal.         Thought Content: Thought content normal.         Judgment: Judgment normal.   Vitals reviewed. Exam conducted with a chaperone present.               Assessment     Diagnoses and all orders for this visit:    1. Encounter for gynecological examination without abnormal finding (Primary)  -     IGP, Apt HPV,rfx 16 / 18,45    2. Vulval hidradenitis suppurativa    3. OCP (oral contraceptive pills) initiation    Other orders  -     norgestimate-ethinyl estradiol (MonoNessa) 0.25-35 MG-MCG per tablet; Take 1 tablet by mouth Daily.  Dispense: 84 tablet; Refill: 3    1) GYN exam   Expectations reviewed.   2) HS with desire to see if OCP with help reduce recurrences  Working with Dermatology   How to start   Common side effects   Life threatening complications   Efficiency reviewed.      Plan     1) Breast Health - Clinical breast exam yearly, Discussed American  cancer society recommendations for breast cancer screening, and Self breast awareness monthly  2) Pap - updated today   3) Smoking status- non-smoker   4) Encouraged to be wary of information obtained via social media and internet based on source and search.  5) Follow up prn and one year.       Amor Michele MD   10/12/2021  14:08 EDT

## 2021-10-13 ENCOUNTER — IMMUNIZATION (OUTPATIENT)
Dept: VACCINE CLINIC | Facility: HOSPITAL | Age: 31
End: 2021-10-13

## 2021-10-13 PROCEDURE — 91300 HC SARSCOV02 VAC 30MCG/0.3ML IM: CPT | Performed by: INTERNAL MEDICINE

## 2021-10-13 PROCEDURE — 0002A: CPT | Performed by: INTERNAL MEDICINE

## 2021-10-14 LAB
CYTOLOGIST CVX/VAG CYTO: NORMAL
CYTOLOGY CVX/VAG DOC CYTO: NORMAL
CYTOLOGY CVX/VAG DOC THIN PREP: NORMAL
DX ICD CODE: NORMAL
HIV 1 & 2 AB SER-IMP: NORMAL
HPV I/H RISK 4 DNA CVX QL PROBE+SIG AMP: NEGATIVE
OTHER STN SPEC: NORMAL
STAT OF ADQ CVX/VAG CYTO-IMP: NORMAL

## 2022-07-03 ENCOUNTER — HOSPITAL ENCOUNTER (EMERGENCY)
Facility: HOSPITAL | Age: 32
Discharge: LEFT BEFORE DISCHARGE | End: 2022-07-03
Attending: EMERGENCY MEDICINE | Admitting: EMERGENCY MEDICINE

## 2022-07-03 VITALS
WEIGHT: 289 LBS | HEART RATE: 98 BPM | OXYGEN SATURATION: 100 % | TEMPERATURE: 97.4 F | DIASTOLIC BLOOD PRESSURE: 79 MMHG | RESPIRATION RATE: 16 BRPM | BODY MASS INDEX: 48.15 KG/M2 | SYSTOLIC BLOOD PRESSURE: 137 MMHG | HEIGHT: 65 IN

## 2022-07-03 DIAGNOSIS — L30.4 INTERTRIGO: Primary | ICD-10-CM

## 2022-07-03 DIAGNOSIS — L03.319 CELLULITIS OF TRUNK, UNSPECIFIED SITE OF TRUNK: ICD-10-CM

## 2022-07-03 PROCEDURE — 99281 EMR DPT VST MAYX REQ PHY/QHP: CPT

## 2022-07-03 RX ORDER — CEPHALEXIN 500 MG/1
500 CAPSULE ORAL ONCE
Status: DISCONTINUED | OUTPATIENT
Start: 2022-07-03 | End: 2022-07-03 | Stop reason: HOSPADM

## 2022-07-03 RX ORDER — FLUCONAZOLE 150 MG/1
150 TABLET ORAL ONCE
Status: DISCONTINUED | OUTPATIENT
Start: 2022-07-03 | End: 2022-07-03 | Stop reason: HOSPADM

## 2022-07-03 NOTE — ED TRIAGE NOTES
"\"Boil\" under right breast - has been worsening over 2 weeks    Patient was placed in face mask during first look triage.  Patient was wearing a face mask throughout encounter.  I wore personal protective equipment throughout the encounter.  Hand hygiene was performed before and after patient encounter.     "

## 2022-07-04 NOTE — ED PROVIDER NOTES
MD ATTESTATION NOTE    The ENRIQUETA and I have discussed this patient's history, physical exam, and treatment plan.    I provided a substantive portion of the care of this patient. I personally performed the physical exam, in its entirety. The attached note describes my personal findings.      Wanda Solis is a 32 y.o. female who presents to the ED c/o right breast infection going on for about 2 weeks.  No fevers chills.       On exam:  GENERAL: not distressed  HENT: nares patent  EYES: no scleral icterus  CV: regular rhythm, regular rate  RESPIRATORY: normal effort  ABDOMEN: soft  MUSCULOSKELETAL: no deformity  NEURO: alert, moves all extremities, follows commands  SKIN: warm, dry, erythema and induration to the chest wall under the fold of the right breast    Labs  No results found for this or any previous visit (from the past 24 hour(s)).    Radiology  No Radiology Exams Resulted Within Past 24 Hours    Medical Decision Making:  ED Course as of 07/04/22 0728   Sun Jul 03, 2022   1928 Pt with h/o hidratentitis. Has painful abscesses beneath breasts [EW]      ED Course User Index  [EW] Sophy Villela APRN           PPE: Both the patient and I wore a surgical mask throughout the entire patient encounter. I wore protective goggles.     Diagnosis  Final diagnoses:   Intertrigo   Cellulitis of trunk, unspecified site of trunk        Arnaud Mcgraw MD  07/04/22 0728

## 2022-07-04 NOTE — ED PROVIDER NOTES
EMERGENCY DEPARTMENT ENCOUNTER    Room Number:  02/02  Date of encounter:  7/4/2022  PCP: Marzena Schmitz APRN  Historian: Patient      HPI:  Chief Complaint: Possible abscess in the right breast    A complete HPI/ROS/PMH/PSH/SH/FH are unobtainable due to: Nothing    Context: Wanda Solis is a 32 y.o. female who presents to the ED c/o right wrist abscess is been ongoing for the past 2 weeks.  Per the patient she has been on 100 mg of doxycycline daily for a month.  She states the lesion under right breast not improving.  She informs me there is moderate discomfort and she is having trouble sleeping secondary to pain.  Nothing makes the pain better or worse.  She denies fever, chills, nausea, vomiting, lightheadedness, dizziness associated with the lesion.      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   • Positive GBS test 11/20/2017   • Obesity affecting pregnancy in third trimester 12/14/2017   • Obesity in pregnancy, antepartum, third trimester 12/14/2017   • Postpartum care and examination immediately after delivery 12/15/2017   • Near syncope 06/27/2018   • Mastitis 09/01/2018   • Influenza A 01/22/2019   • Influenza A 01/23/2019   • Supervision of normal pregnancy in third trimester 02/01/2019   • Normal vaginal delivery 02/01/2019   • Iron deficiency anemia secondary to inadequate dietary iron intake 02/02/2019     Resolved Ambulatory Problems     Diagnosis Date Noted   • Breast abscess during pregnancy, antepartum 09/01/2018     Past Medical History:   Diagnosis Date   • Abnormal Pap smear of cervix    • Hidradenitis suppurativa    • Urogenital trichomoniasis          PAST SURGICAL HISTORY  Past Surgical History:   Procedure Laterality Date   • DILATATION AND CURETTAGE     • INCISION AND DRAINAGE PERIRECTAL ABSCESS Left 9/1/2018    Procedure: INCISION AND DRAINAGE OF LEFT BREAST;  Surgeon: Parvez oLng MD;  Location: [facility]  OR;  Service: General   • WISDOM TOOTH EXTRACTION           FAMILY HISTORY  Family History   Problem Relation Age of Onset   • No Known Problems Father    • No Known Problems Mother    • Breast cancer Neg Hx    • Ovarian cancer Neg Hx    • Uterine cancer Neg Hx    • Colon cancer Neg Hx    • Deep vein thrombosis Neg Hx    • Pulmonary embolism Neg Hx          SOCIAL HISTORY  Social History     Socioeconomic History   • Marital status: Single   Tobacco Use   • Smoking status: Never Smoker   • Smokeless tobacco: Never Used   Substance and Sexual Activity   • Alcohol use: No   • Drug use: No   • Sexual activity: Not Currently     Partners: Male     Birth control/protection: None         ALLERGIES  Patient has no known allergies.        REVIEW OF SYSTEMS  Review of Systems   Constitutional: Negative for chills and fever.   HENT: Negative.    Eyes: Negative.    Respiratory: Negative for cough and shortness of breath.    Cardiovascular: Negative for chest pain and palpitations.   Gastrointestinal: Negative for abdominal pain.   Genitourinary: Negative.    Musculoskeletal: Negative for back pain and neck pain.   Skin:        Skin lesion under right breast   Neurological: Negative.    Psychiatric/Behavioral: Negative.         All systems reviewed and negative except for those discussed in HPI.       PHYSICAL EXAM    I have reviewed the triage vital signs and nursing notes.    ED Triage Vitals   Temp Heart Rate Resp BP SpO2   07/03/22 1835 07/03/22 1835 07/03/22 1835 07/03/22 1852 07/03/22 1835   97.4 °F (36.3 °C) 98 16 137/79 100 %      Temp src Heart Rate Source Patient Position BP Location FiO2 (%)   -- 07/03/22 1835 -- -- --    Monitor          Physical Exam  GENERAL: WDWN female distress  HENT: nares patent  EYES: no scleral icterus  CV: regular rhythm, regular rate  RESPIRATORY: normal effort  ABDOMEN: soft  MUSCULOSKELETAL: no deformity  NEURO: alert, moves all extremities, follows commands  SKIN: There is an area of  warmth and induration on the chest wall the fold of the right breast.  I do not appreciate an area of central fluctuance or presence of abscess.        LAB RESULTS  No results found for this or any previous visit (from the past 24 hour(s)).    Ordered the above labs and independently reviewed the results.        RADIOLOGY  No Radiology Exams Resulted Within Past 24 Hours    I ordered the above noted radiological studies. Reviewed by me and discussed with radiologist.  See dictation for official radiology interpretation.      PROCEDURES    Procedures      MEDICATIONS GIVEN IN ER    Medications - No data to display      PROGRESS, DATA ANALYSIS, CONSULTS, AND MEDICAL DECISION MAKING    All labs have been independently reviewed by me.  All radiology studies have been reviewed by me and discussed with radiologist dictating the report.   EKG's independently viewed and interpreted by me.  Discussion below represents my analysis of pertinent findings related to patient's condition, differential diagnosis, treatment plan and final disposition.    DDx includes but is not limited to: Intertrigo secondary to fungal infection, secondary cellulitis, abscess.  Physical exam points away from abscess.  Suspect this is either bacterial or fungal.  Plan to treat with both antifungal as well as Keflex with close follow-up.    ED Course as of 07/04/22 0542   Sun Jul 03, 2022   1928 Pt with h/o hidratentitis. Has painful abscesses beneath breasts [EW]      ED Course User Index  [EW] Sophy Villela APRN           PPE: The patient wore a surgical mask throughout the entire patient encounter. I wore an N95.    AS OF 05:42 EDT VITALS:    BP - 137/79  HR - 98  TEMP - 97.4 °F (36.3 °C)  O2 SATS - 100%        DIAGNOSIS  Final diagnoses:   Intertrigo   Cellulitis of trunk, unspecified site of trunk         DISPOSITION  Tommie Vargas III, PA  07/04/22 0542

## 2022-11-08 ENCOUNTER — HOSPITAL ENCOUNTER (EMERGENCY)
Facility: HOSPITAL | Age: 32
Discharge: HOME | End: 2022-11-08
Attending: EMERGENCY MEDICINE | Admitting: EMERGENCY MEDICINE

## 2022-11-08 VITALS
TEMPERATURE: 98.5 F | HEART RATE: 82 BPM | DIASTOLIC BLOOD PRESSURE: 86 MMHG | OXYGEN SATURATION: 99 % | SYSTOLIC BLOOD PRESSURE: 132 MMHG | RESPIRATION RATE: 16 BRPM

## 2022-11-08 DIAGNOSIS — L03.011 PARONYCHIA OF RIGHT THUMB: Primary | ICD-10-CM

## 2022-11-08 PROCEDURE — 99282 EMERGENCY DEPT VISIT SF MDM: CPT

## 2022-11-08 NOTE — ED PROVIDER NOTES
MD ATTESTATION NOTE    The ENRIQUETA and I have discussed this patient's history, physical exam, and treatment plan.  I have reviewed the documentation and personally had a face to face interaction with the patient. I affirm the documentation and agree with the treatment and plan.  The attached note describes my personal findings.      I provided a substantive portion of the care of the patient.  I personally performed the physical exam in its entirety, and below are my findings.  For this patient encounter, the patient wore surgical mask, I wore full protective PPE including N95 and eye protection.      Brief HPI: This patient is a 32-year-old female presenting to the emergency room today secondary to pain and swelling of the right thumb this been present for the past 2 to 3 weeks.    PHYSICAL EXAM  ED Triage Vitals   Temp Heart Rate Resp BP SpO2   11/08/22 0104 11/08/22 0104 11/08/22 0104 11/08/22 0401 11/08/22 0104   98.5 °F (36.9 °C) 90 16 131/88 100 %      Temp src Heart Rate Source Patient Position BP Location FiO2 (%)   11/08/22 0104 11/08/22 0104 -- -- --   Tympanic Monitor            GENERAL: Resting comfortably and in no acute distress, nontoxic in appearance  HENT: nares patent  EYES: no scleral icterus  CV: regular rhythm, normal rate, no M/R/G  RESPIRATORY: normal effort  MUSCULOSKELETAL: no deformity, paronychia present to the right thumb  NEURO: alert, moves all extremities, follows commands  PSYCH:  calm, cooperative  SKIN: warm, dry    Vital signs and nursing notes reviewed.        Plan: We will drain the patient's paronychia and reassess following.       Hill Bo MD  11/08/22 0531

## 2022-11-08 NOTE — ED TRIAGE NOTES
Pt presents to ER from home with c/o pain to right thumb.  Pt states she has an infection to her cuticle and was seen at urgent care today  And given an antibiotic.  Pt took one dose of the antibiotic today and her thumb is not better so she is here to been seen.   This RN in appropriate PPE for all patient care interactions. Pt masked and redirected for proper mask use when necessary. Hand hygiene performed before and after all patient care interactions.

## 2022-11-08 NOTE — ED PROVIDER NOTES
EMERGENCY DEPARTMENT ENCOUNTER    Room Number:  08/08  Date of encounter:  11/8/2022  PCP: Marzena Schmitz APRN  Historian: Patient      HPI:  Chief Complaint: Right thumb pain  A complete HPI/ROS/PMH/PSH/SH/FH are unobtainable due to: N/A    Context: Wanda Solis is a 32 y.o. female with past medical history of hidradenitis suppurativa who presents to the ED c/o right thumb infection.  Patient states that she started to have pain and swelling to the right thumb approximately 2 to 3 weeks ago that has gotten progressively worse with increased swelling, warmth, and redness.  Patient states that she went to her PCP and was started on antibiotic has not had any improvement in her symptoms.  Denies any drainage, no fever, no chest pain or shortness of breath.      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   • Positive GBS test 11/20/2017   • Obesity affecting pregnancy in third trimester 12/14/2017   • Obesity in pregnancy, antepartum, third trimester 12/14/2017   • Postpartum care and examination immediately after delivery 12/15/2017   • Near syncope 06/27/2018   • Mastitis 09/01/2018   • Influenza A 01/22/2019   • Influenza A 01/23/2019   • Supervision of normal pregnancy in third trimester 02/01/2019   • Normal vaginal delivery 02/01/2019   • Iron deficiency anemia secondary to inadequate dietary iron intake 02/02/2019     Resolved Ambulatory Problems     Diagnosis Date Noted   • Breast abscess during pregnancy, antepartum 09/01/2018     Past Medical History:   Diagnosis Date   • Abnormal Pap smear of cervix    • Hidradenitis suppurativa    • Urogenital trichomoniasis          PAST SURGICAL HISTORY  Past Surgical History:   Procedure Laterality Date   • DILATATION AND CURETTAGE     • INCISION AND DRAINAGE PERIRECTAL ABSCESS Left 9/1/2018    Procedure: INCISION AND DRAINAGE OF LEFT BREAST;  Surgeon: Parvez Long MD;  Location: [facility]  OR;  Service: General   • WISDOM TOOTH EXTRACTION           FAMILY HISTORY  Family History   Problem Relation Age of Onset   • No Known Problems Father    • No Known Problems Mother    • Breast cancer Neg Hx    • Ovarian cancer Neg Hx    • Uterine cancer Neg Hx    • Colon cancer Neg Hx    • Deep vein thrombosis Neg Hx    • Pulmonary embolism Neg Hx          SOCIAL HISTORY  Social History     Socioeconomic History   • Marital status: Single   Tobacco Use   • Smoking status: Never   • Smokeless tobacco: Never   Substance and Sexual Activity   • Alcohol use: No   • Drug use: No   • Sexual activity: Not Currently     Partners: Male     Birth control/protection: None         ALLERGIES  Patient has no known allergies.        REVIEW OF SYSTEMS  Review of Systems     All systems reviewed and negative except for those discussed in HPI.       PHYSICAL EXAM    I have reviewed the triage vital signs and nursing notes.    ED Triage Vitals   Temp Heart Rate Resp BP SpO2   11/08/22 0104 11/08/22 0104 11/08/22 0104 11/08/22 0401 11/08/22 0104   98.5 °F (36.9 °C) 90 16 131/88 100 %      Temp src Heart Rate Source Patient Position BP Location FiO2 (%)   11/08/22 0104 11/08/22 0104 -- -- --   Tympanic Monitor          Physical Exam  GENERAL: Alert and orient x4, not distressed  HENT: nares patent  EYES: no scleral icterus  CV: regular rhythm, regular rate  RESPIRATORY: normal effort  MUSCULOSKELETAL: no deformity, normal ROM, no bony tenderness  NEURO: alert, moves all extremities, follows commands  SKIN: warm, dry, there is a paronychia of the right thumb with moderate soft tissue swelling, fluctuance, and tenderness        LAB RESULTS  No results found for this or any previous visit (from the past 24 hour(s)).    Ordered the above labs and independently reviewed the results.        RADIOLOGY  No Radiology Exams Resulted Within Past 24 Hours    I ordered the above noted radiological studies. Reviewed by me and discussed with  radiologist.  See dictation for official radiology interpretation.      PROCEDURES    Incision & Drainage    Date/Time: 11/8/2022 5:06 AM  Performed by: Surinder Olvera PA  Authorized by: Hill Bo MD     Consent:     Consent obtained:  Verbal    Consent given by:  Patient    Risks discussed:  Bleeding, incomplete drainage and pain    Alternatives discussed:  No treatment  Universal protocol:     Procedure explained and questions answered to patient or proxy's satisfaction: yes      Patient identity confirmed:  Verbally with patient  Location:     Type:  Abscess    Location:  Upper extremity    Upper extremity location:  Finger    Finger location:  R thumb  Pre-procedure details:     Skin preparation:  Povidone-iodine  Sedation:     Sedation type:  None  Anesthesia:     Anesthesia method:  None  Procedure type:     Complexity:  Simple  Procedure details:     Incision types:  Stab incision    Incision depth:  Dermal    Wound management:  Irrigated with saline    Drainage:  Purulent    Drainage amount:  Moderate    Wound treatment:  Wound left open    Packing materials:  None  Post-procedure details:     Procedure completion:  Tolerated well, no immediate complications          MEDICATIONS GIVEN IN ER    Medications - No data to display      PROGRESS, DATA ANALYSIS, CONSULTS, AND MEDICAL DECISION MAKING    All labs have been independently reviewed by me.  All radiology studies have been reviewed by me and discussed with radiologist dictating the report.   EKG's independently viewed and interpreted by me.  Discussion below represents my analysis of pertinent findings related to patient's condition, differential diagnosis, treatment plan and final disposition.    DDx: Includes but limited to abscess, cellulitis, paronychia         MDM: Patient's paronychia has been drained, she is already on antibiotics, has been given wound care instructions, and is safe for discharge home.    PPE: The patient wore a  surgical mask throughout the entire patient encounter. I wore an N95.    AS OF 05:09 EST VITALS:    BP - 132/86  HR - 78  TEMP - 98.5 °F (36.9 °C) (Tympanic)  O2 SATS - 97%        DIAGNOSIS  Final diagnoses:   Paronychia of right thumb         DISPOSITION  DISCHARGE    Patient discharged in stable condition.    Reviewed implications of results, diagnosis, meds, responsibility to follow up, warning signs and symptoms of possible worsening, potential complications and reasons to return to ER.    Patient/Family voiced understanding of above instructions.    Discussed plan for discharge, as there is no emergent indication for admission. Patient referred to primary care provider for BP management due to today's BP. Pt/family is agreeable and understands need for follow up and repeat testing.  Pt is aware that discharge does not mean that nothing is wrong but it indicates no emergency is present that requires admission and they must continue care with follow-up as given below or physician of their choice.     FOLLOW-UP  Marzena Schmitz, TAHMINA  [id number] [id number]  [phone]    Schedule an appointment as soon as possible for a visit in 3 days  For wound re-check         Medication List      No changes were made to your prescriptions during this visit.           Note Disclaimer: At [facility], we believe that sharing information builds trust and better relationships. You are receiving this note because you recently visited [facility]. It is possible you will see health information before a provider has talked with you about it. This kind of information can be easy to misunderstand. To help you fully understand what it means for your health, we urge you to discuss this note with your provider.       Surinder Olvera PA  11/08/22 050

## 2022-11-08 NOTE — DISCHARGE INSTRUCTIONS
Home medicine as prescribed, warm compresses, continue with previously prescribed antibiotic as directed.  Follow up with PCP for recheck in 2 to 3 days. Return to care with further concerns.

## 2023-03-10 ENCOUNTER — OFFICE VISIT (OUTPATIENT)
Dept: OBSTETRICS AND GYNECOLOGY | Facility: CLINIC | Age: 33
End: 2023-03-10
Payer: COMMERCIAL

## 2023-03-10 VITALS
DIASTOLIC BLOOD PRESSURE: 70 MMHG | BODY MASS INDEX: 45.98 KG/M2 | WEIGHT: 276 LBS | HEIGHT: 65 IN | SYSTOLIC BLOOD PRESSURE: 114 MMHG | HEART RATE: 78 BPM

## 2023-03-10 DIAGNOSIS — Z01.419 ENCOUNTER FOR GYNECOLOGICAL EXAMINATION WITHOUT ABNORMAL FINDING: Primary | ICD-10-CM

## 2023-03-10 PROCEDURE — 1159F MED LIST DOCD IN RCRD: CPT | Performed by: OBSTETRICS & GYNECOLOGY

## 2023-03-10 PROCEDURE — 1160F RVW MEDS BY RX/DR IN RCRD: CPT | Performed by: OBSTETRICS & GYNECOLOGY

## 2023-03-10 PROCEDURE — 99395 PREV VISIT EST AGE 18-39: CPT | Performed by: OBSTETRICS & GYNECOLOGY

## 2023-03-10 PROCEDURE — 2014F MENTAL STATUS ASSESS: CPT | Performed by: OBSTETRICS & GYNECOLOGY

## 2023-03-10 RX ORDER — ADALIMUMAB 80MG/0.8ML
KIT SUBCUTANEOUS
COMMUNITY
Start: 2023-02-23

## 2023-03-10 NOTE — PROGRESS NOTES
GYN Annual Exam     CC- Here for annual exam.     Wanda Solis is a 33 y.o. female who presents for annual well woman exam. Periods are regular every 28-30 days, lasting 5 days. Dysmenorrhea:mild, occurring first 1-2 days of flow. Cyclic symptoms include none. No intermenstrual bleeding, spotting, or discharge.    OB History        5    Para   3    Term   3            AB   2    Living   3       SAB   1    IAB        Ectopic        Molar        Multiple   0    Live Births   3                Current contraception: none  History of abnormal Pap smear: Yes, no treatment required  Family history of uterine, colon or ovarian cancer: no  History of abnormal mammogram: no  Family history of breast cancer: no  Last Pap : 10/12/2021 NIL HPV neg     Past Medical History:   Diagnosis Date   • Abnormal Pap smear of cervix     follow up wnl    • Anemia    • Hidradenitis suppurativa    • Urogenital trichomoniasis        Past Surgical History:   Procedure Laterality Date   • DILATATION AND CURETTAGE     • INCISION AND DRAINAGE PERIRECTAL ABSCESS Left 2018    Procedure: INCISION AND DRAINAGE OF LEFT BREAST;  Surgeon: Parvez Long MD;  Location: [facility];  Service: General   • WISDOM TOOTH EXTRACTION           Current Outpatient Medications:   •  benzoyl peroxide 10 % external wash, USE TO WASH AFFECTED AREAS ONCE DAILY AS DIRECTED, Disp: , Rfl:   •  doxycycline (MONODOX) 100 MG capsule, TAKE 1 CAPSULE BY MOUTH TWICE DAILY WITH FOOD. AVOID THE SUN, Disp: , Rfl:   •  ferrous sulfate 325 (65 FE) MG tablet, Take 325 mg by mouth Daily., Disp: , Rfl:   •  Humira Pen-CD/UC/HS Starter 80 MG/0.8ML injection, , Disp: , Rfl:   •  vitamin D (ERGOCALCIFEROL) 1.25 MG (51596 UT) capsule capsule, , Disp: , Rfl:     No Known Allergies    Social History     Tobacco Use   • Smoking status: Never   • Smokeless tobacco: Never   Substance Use Topics   • Alcohol use: No   • Drug use: No       Family History   Problem  "Relation Age of Onset   • No Known Problems Father    • No Known Problems Mother    • Breast cancer Neg Hx    • Ovarian cancer Neg Hx    • Uterine cancer Neg Hx    • Colon cancer Neg Hx    • Deep vein thrombosis Neg Hx    • Pulmonary embolism Neg Hx        Review of Systems   Constitutional: Negative for chills and fever.   Gastrointestinal: Negative for abdominal pain, constipation and diarrhea.   Genitourinary: Negative for menstrual problem, pelvic pain, vaginal bleeding and vaginal discharge.   All other systems reviewed and are negative.      /70   Pulse 78   Ht 165.1 cm (65\")   Wt 125 kg (276 lb)   LMP  (Approximate) Comment: 2 weeks ago  BMI 45.93 kg/m²     Physical Exam  Constitutional:       General: She is not in acute distress.     Appearance: She is well-developed. She is obese.   Genitourinary:      Vulva normal.      Right Labia: skin changes.      Right Labia: No lesions or Bartholin's cyst.     Left Labia: skin changes (Multiple HS ).      Left Labia: No lesions or Bartholin's cyst.     No inguinal adenopathy present in the right or left side.     No vaginal discharge or bleeding.        Right Adnexa: not tender, not full and no mass present.     Left Adnexa: not tender, not full and no mass present.     No cervical motion tenderness or friability.      Uterus is fixed (Difficult exam, habitus - possibley scarred ).      Uterus is not enlarged or tender.      No uterine mass detected.     Uterus is anteverted.   Breasts:     Right: No inverted nipple, mass or nipple discharge.      Left: No inverted nipple, mass or nipple discharge.   HENT:      Head: Normocephalic and atraumatic.      Nose: Nose normal.   Eyes:      Conjunctiva/sclera: Conjunctivae normal.      Pupils: Pupils are equal, round, and reactive to light.   Neck:      Thyroid: No thyromegaly.   Cardiovascular:      Rate and Rhythm: Normal rate and regular rhythm.      Heart sounds: Normal heart sounds. No murmur " heard.  Pulmonary:      Effort: Pulmonary effort is normal. No respiratory distress.      Breath sounds: Normal breath sounds.   Abdominal:      General: Abdomen is flat. There is no distension.      Palpations: Abdomen is soft.      Tenderness: There is no abdominal tenderness.   Musculoskeletal:         General: No deformity. Normal range of motion.      Cervical back: Normal range of motion and neck supple.      Right lower leg: No edema.      Left lower leg: No edema.   Lymphadenopathy:      Lower Body: No right inguinal adenopathy. No left inguinal adenopathy.   Neurological:      Mental Status: She is alert and oriented to person, place, and time.   Skin:     General: Skin is warm and dry.      Findings: Lesion (Multiple HS around breast, vulvar ) present. No erythema.   Psychiatric:         Behavior: Behavior normal.         Thought Content: Thought content normal.         Judgment: Judgment normal.   Vitals reviewed. Exam conducted with a chaperone present.               Assessment     Diagnoses and all orders for this visit:    1. Encounter for gynecological examination without abnormal finding (Primary)    Expectations reviewed.   Declined contraceptives (abstinent)      Plan     1) Breast Health - Clinical breast exam yearly, Discussed American cancer society recommendations for breast cancer screening, and Self breast awareness monthly  2) Pap - up to date   3) Smoking status- non-smoker   4) Encouraged between 7-8 hours of good sleep per night.   5) Follow up prn and one year.       Amor Michele MD   3/10/2023  11:52 EST

## 2023-05-31 ENCOUNTER — OFFICE VISIT (OUTPATIENT)
Dept: INTERNAL MEDICINE | Facility: CLINIC | Age: 33
End: 2023-05-31

## 2023-05-31 VITALS
HEIGHT: 65 IN | SYSTOLIC BLOOD PRESSURE: 120 MMHG | HEART RATE: 85 BPM | OXYGEN SATURATION: 100 % | WEIGHT: 275.4 LBS | DIASTOLIC BLOOD PRESSURE: 80 MMHG | BODY MASS INDEX: 45.88 KG/M2

## 2023-05-31 DIAGNOSIS — L73.2 HIDRADENITIS: ICD-10-CM

## 2023-05-31 DIAGNOSIS — M79.605 PAIN IN BOTH LOWER EXTREMITIES: ICD-10-CM

## 2023-05-31 DIAGNOSIS — M79.604 PAIN IN BOTH LOWER EXTREMITIES: ICD-10-CM

## 2023-05-31 DIAGNOSIS — R06.83 SNORES: ICD-10-CM

## 2023-05-31 DIAGNOSIS — Z00.00 ANNUAL PHYSICAL EXAM: Primary | ICD-10-CM

## 2023-05-31 DIAGNOSIS — E55.9 VITAMIN D DEFICIENCY: ICD-10-CM

## 2023-05-31 DIAGNOSIS — Z76.89 ENCOUNTER TO ESTABLISH CARE: ICD-10-CM

## 2023-05-31 DIAGNOSIS — D50.8 IRON DEFICIENCY ANEMIA SECONDARY TO INADEQUATE DIETARY IRON INTAKE: ICD-10-CM

## 2023-05-31 PROBLEM — O99.213 OBESITY IN PREGNANCY, ANTEPARTUM, THIRD TRIMESTER: Status: RESOLVED | Noted: 2017-12-14 | Resolved: 2023-05-31

## 2023-05-31 PROBLEM — J10.1 INFLUENZA A: Status: RESOLVED | Noted: 2019-01-23 | Resolved: 2023-05-31

## 2023-05-31 PROBLEM — Z34.93 SUPERVISION OF NORMAL PREGNANCY IN THIRD TRIMESTER: Status: RESOLVED | Noted: 2019-02-01 | Resolved: 2023-05-31

## 2023-05-31 PROBLEM — J10.1 INFLUENZA A: Status: RESOLVED | Noted: 2019-01-22 | Resolved: 2023-05-31

## 2023-05-31 PROBLEM — O99.213 OBESITY AFFECTING PREGNANCY IN THIRD TRIMESTER: Status: RESOLVED | Noted: 2017-12-14 | Resolved: 2023-05-31

## 2023-05-31 NOTE — PROGRESS NOTES
Chief Complaint  Annual Exam, Establish Care, and Lower Extremity Issue (Legs ache at night while lying down)     Subjective:      History of Present Illness {CC  Problem List  Visit  Diagnosis   Encounters  Notes  Medications  Labs  Result Review Imaging  Media :23}     Wanda Solis presents to [facility] for: Annual exam:    She is new to me and also here to establish care.    Past medical history: Hidradenitis, [place], vitamin d deficiency     HS: since 2019: between breasts, axila and groin.   She was prescribed humira but never started.     Derm: Dr Schmitt    Leg ache sometimes at night when she lays down. No weakness or numbness.     Wanda is here for coordination of medical care, to discuss health maintenance, disease prevention as well as to followup on medical problems.     Patient Care Team:  Barak Greenberg III, NP-C as PCP - General (Internal Medicine)  Ryne, Amor Michelle MD as Consulting Physician (Obstetrics and Gynecology)  Mary Schmitt MD (Dermatology)     Activity level is moderate.     Weight trend is     Wt Readings from Last 4 Encounters:   05/31/23 125 kg (275 lb 6.4 oz)   03/10/23 125 kg (276 lb)   07/03/22 131 kg (289 lb)   10/12/21 127 kg (281 lb)         Health Maintenance Female:    · GYN:  Ryne  · Last gynecology appointment: 3/2023  · Advised routine self-breast exams monthly.  · Pap smears have been: normal     Colon cancer screen:   Colonoscopy due at 43 yoa unless clinically indicated before.     Advised regular sunscreen.      Her cardiovascular risks are:     [] No Known risk factors    [] Hypertension   [] Hyperlipidemia  [] Diabetes    [x] Obesity  [x] Family history   [] Current or hx tobacco use  [] Sedentary lifestyle   [] Post-menopausal     Single: 3 children  No history of smoking    I have reviewed patient's medical history, any new submitted information provided by patient or medical assistant and  "updated medical record.      Objective:      Physical Exam  Vitals reviewed.   Constitutional:       Appearance: Normal appearance. She is well-developed.   HENT:      Head: Normocephalic.      Nose: Nose normal.      Mouth/Throat:      Pharynx: Uvula midline.   Eyes:      Conjunctiva/sclera: Conjunctivae normal.      Pupils: Pupils are equal, round, and reactive to light.   Neck:      Thyroid: No thyromegaly.   Cardiovascular:      Rate and Rhythm: Normal rate and regular rhythm.      Pulses: Normal pulses.      Heart sounds: Normal heart sounds, S1 normal and S2 normal. No murmur heard.  Pulmonary:      Effort: Pulmonary effort is normal.      Breath sounds: Normal breath sounds.   Chest:      Chest wall: No deformity.   Abdominal:      General: Bowel sounds are normal.      Palpations: Abdomen is soft.      Tenderness: There is no abdominal tenderness. Negative signs include Moreno's sign.   Genitourinary:     Comments: Defer Gyn   Musculoskeletal:         General: Normal range of motion.      Cervical back: Normal range of motion and neck supple.   Lymphadenopathy:      Cervical: No cervical adenopathy.   Skin:     General: Skin is warm and dry.      Capillary Refill: Capillary refill takes 2 to 3 seconds.      Comments: Eruptions under arms, between breast and groin  No s/sx infection    Neurological:      Mental Status: She is alert and oriented to person, place, and time.      Cranial Nerves: No cranial nerve deficit.      Sensory: No sensory deficit.      Coordination: Coordination normal.   Psychiatric:         Speech: Speech normal.         Behavior: Behavior normal. Behavior is cooperative.         Thought Content: Thought content normal.         Judgment: Judgment normal.        Result Review  Data Reviewed:{ Labs  Result Review  Imaging  Med Tab  Media :23}                Vital Signs:   /80 (BP Location: Left arm, Patient Position: Sitting, Cuff Size: Adult)   Pulse 85   Ht 165.1 cm (65\")   " Wt 125 kg (275 lb 6.4 oz)   SpO2 100%   BMI 45.83 kg/m²         Requested Prescriptions      No prescriptions requested or ordered in this encounter       Routine medications provided by this office will also be refilled via pharmacy request.       Current Outpatient Medications:   •  benzoyl peroxide 10 % external wash, USE TO WASH AFFECTED AREAS ONCE DAILY AS DIRECTED, Disp: , Rfl:   •  ferrous sulfate 325 (65 FE) MG tablet, Take 1 tablet by mouth Daily., Disp: , Rfl:   •  vitamin D (ERGOCALCIFEROL) 1.25 MG (88582 UT) capsule capsule, , Disp: , Rfl:      Assessment and Plan:      Assessment and Plan {CC Problem List  Visit Diagnosis  ROS  Review (Popup)  Health Maintenance  Quality  BestPractice  Medications  SmartSets  SnapShot Encounters  Media :23}     Problem List Items Addressed This Visit        Endocrine and Metabolic    Vitamin D deficiency    Relevant Orders    Vitamin D 25 hydroxy       Hematology and Neoplasia    Iron deficiency anemia secondary to inadequate dietary iron intake (Chronic)    Current Assessment & Plan     Check labs today          Relevant Orders    Comprehensive Metabolic Panel    Lipid Panel With LDL / HDL Ratio    CBC (No Diff)    Ferritin       Skin    Hidradenitis    Overview     Prior treatment:  Benzoyl peroxide, Hibiclens, doxycyline     Prescribed humira but did not start    Derm: [facility]          Current Assessment & Plan     Needs to follow up with derm         Other Visit Diagnoses     Annual physical exam    -  Primary    Encounter to establish care        Pain in both lower extremities        Relevant Orders    Magnesium    Snores        States still needs to do sleep study - she will contact them.            Follow Up {Instructions Charge Capture  Follow-up Communications :23}     Return in about 1 year (around 5/31/2024) for Annual physical.      Patient was given instructions and counseling regarding her condition or for health maintenance advice. Please  see specific information pulled into the AVS if appropriate.    Neftaly disclaimer:   Much of this encounter note is an electronic transcription/translation of spoken language to printed text. The electronic translation of spoken language may permit erroneous, or at times, nonsensical words or phrases to be inadvertently transcribed; Although I have reviewed the note for such errors, some may still exist.     Additional Patient Counseling:       Patient Instructions     Summer is coming!   Health Information:     Stay hydrated when outside  If your urine starts to get darker, you are not drinking enough     Protect yourself from ticks and mosquitos  Here are the best ingredients to look for:  · DEET (N,N-diethyl-m-toluamide or N,N-diethyl-3-methyl-benzamide)  · Picaridin  · Oil of lemon eucalyptus (p-menthane-3,8-diol or PMD)  Wear light-colored pants so you can spot ticks easier  If you use a permethrin product, ONLY apply to clothing    Practice Safe Sun!    · Use sun screen SPF >50 daily, reapply regularly per directions on package  · See dermatologist for skin check regularly  · Protect your eyes with sunglasses with UV protection    Poison Ivy  If you are going into areas that may have poison ivy, prepare with a product like IvyX or other ivy blocker.  Wash your clothes and pets after being in an area with ivy when returning home. If you come in contact with poison ivy, try a product like Technu     Other things you should incorporate all year...     Diet:    • Eat vegetables, fruits, whole grain, low-fat dairy, poultry, fish, beans, nontropical vegetable oils, and nuts, but avoid red meat (i.e., Mediterranean-style diet, DASH [Dietary Approaches to Stop Hypertension] diet).  • Limit sugary drinks and sweets.  • Limit saturated and trans fat to 5% to 6% of calories.  • Limit sodium intake to 2,400 mg daily (about one teaspoon table salt [kosher/sea salt have less sodium per  alva).  • https://www.eatright.org/    Weight loss / Calorie Counting Apps:    • Lose It!   • MyFitness Pal   • Works great when you try it with a partner/ friend. It takes about 15 minutes a day but studies show that this simple method of monitoring your intake can help you achieve goals as it keeps you accountable.  I often will ask patients to try these apps just to get an idea of how much sodium and how many carbohydrates you are taking in.   Exercise:   • Engage in moderate-to-vigorous aerobic activity for at least 40 minutes (on average) three to four times each week.  Wearables:   • Activity tracker   • Step tracker: getting 7,500 steps daily can cut your cardiac risks by 44%   Bone Health:   • [email or web address]  • Routine weight bearing exercise      Please Note: Summer 2023 our office will be moving to our NEW LOCATION:   [address] [place] [address] [place]: [address]  Please verify location of your next appointment on [employer]  (moved is expected end of July or August)

## 2023-05-31 NOTE — PATIENT INSTRUCTIONS
Summer is coming!   Health Information:     Stay hydrated when outside  If your urine starts to get darker, you are not drinking enough     Protect yourself from ticks and mosquitos  Here are the best ingredients to look for:  DEET (N,N-diethyl-m-toluamide or N,N-diethyl-3-methyl-benzamide)  Picaridin  Oil of lemon eucalyptus (p-menthane-3,8-diol or PMD)  Wear light-colored pants so you can spot ticks easier  If you use a permethrin product, ONLY apply to clothing    Practice Safe Sun!    Use sun screen SPF >50 daily, reapply regularly per directions on package  See dermatologist for skin check regularly  Protect your eyes with sunglasses with UV protection    Poison Ivy  If you are going into areas that may have poison ivy, prepare with a product like IvyX or other ivy blocker.  Wash your clothes and pets after being in an area with ivy when returning home. If you come in contact with poison ivy, try a product like Technu     Other things you should incorporate all year...     Diet:    Eat vegetables, fruits, whole grain, low-fat dairy, poultry, fish, beans, nontropical vegetable oils, and nuts, but avoid red meat (i.e., Mediterranean-style diet, DASH [Dietary Approaches to Stop Hypertension] diet).  Limit sugary drinks and sweets.  Limit saturated and trans fat to 5% to 6% of calories.  Limit sodium intake to 2,400 mg daily (about one teaspoon table salt [kosher/sea salt have less sodium per teaspoon]).  https://www.eatright.org/    Weight loss / Calorie Counting Apps:    Lose It!   MyFitness Pal   Works great when you try it with a partner/ friend. It takes about 15 minutes a day but studies show that this simple method of monitoring your intake can help you achieve goals as it keeps you accountable.  I often will ask patients to try these apps just to get an idea of how much sodium and how many carbohydrates you are taking in.   Exercise:   Engage in moderate-to-vigorous aerobic activity for at least 40  minutes (on average) three to four times each week.  Wearables:   Activity tracker   Step tracker: getting 7,500 steps daily can cut your cardiac risks by 44%   Bone Health:   [email or web address]  Routine weight bearing exercise      Please Note: Summer 2023 our office will be moving to our NEW LOCATION:   [address] [place] [address] [place]: [address]  Please verify location of your next appointment on [employer]  (moved is expected end of July or August)

## 2023-06-01 ENCOUNTER — PATIENT ROUNDING (BHMG ONLY) (OUTPATIENT)
Dept: INTERNAL MEDICINE | Facility: CLINIC | Age: 33
End: 2023-06-01

## 2023-06-01 LAB
25(OH)D3+25(OH)D2 SERPL-MCNC: 29 NG/ML (ref 30–100)
ALBUMIN SERPL-MCNC: 3.8 G/DL (ref 3.5–5.2)
ALBUMIN/GLOB SERPL: 1.1 G/DL
ALP SERPL-CCNC: 73 U/L (ref 39–117)
ALT SERPL-CCNC: 12 U/L (ref 1–33)
AST SERPL-CCNC: 11 U/L (ref 1–32)
BILIRUB SERPL-MCNC: 0.3 MG/DL (ref 0–1.2)
BUN SERPL-MCNC: 10 MG/DL (ref 6–20)
BUN/CREAT SERPL: 15.4 (ref 7–25)
CALCIUM SERPL-MCNC: 9.2 MG/DL (ref 8.6–10.5)
CHLORIDE SERPL-SCNC: 105 MMOL/L (ref 98–107)
CHOLEST SERPL-MCNC: 141 MG/DL (ref 0–200)
CO2 SERPL-SCNC: 25 MMOL/L (ref 22–29)
CREAT SERPL-MCNC: 0.65 MG/DL (ref 0.57–1)
EGFRCR SERPLBLD CKD-EPI 2021: 117.9 ML/MIN/1.73
ERYTHROCYTE [DISTWIDTH] IN BLOOD BY AUTOMATED COUNT: 15.6 % (ref 12.3–15.4)
FERRITIN SERPL-MCNC: 29.1 NG/ML (ref 13–150)
GLOBULIN SER CALC-MCNC: 3.4 GM/DL
GLUCOSE SERPL-MCNC: 89 MG/DL (ref 65–99)
HCT VFR BLD AUTO: 32.1 % (ref 34–46.6)
HDLC SERPL-MCNC: 38 MG/DL (ref 40–60)
HGB BLD-MCNC: 10.5 G/DL (ref 12–15.9)
LDLC SERPL CALC-MCNC: 90 MG/DL (ref 0–100)
LDLC/HDLC SERPL: 2.39 {RATIO}
MAGNESIUM SERPL-MCNC: 2 MG/DL (ref 1.6–2.6)
MCH RBC QN AUTO: 26 PG (ref 26.6–33)
MCHC RBC AUTO-ENTMCNC: 32.7 G/DL (ref 31.5–35.7)
MCV RBC AUTO: 79.5 FL (ref 79–97)
PLATELET # BLD AUTO: 343 10*3/MM3 (ref 140–450)
POTASSIUM SERPL-SCNC: 4.1 MMOL/L (ref 3.5–5.2)
PROT SERPL-MCNC: 7.2 G/DL (ref 6–8.5)
RBC # BLD AUTO: 4.04 10*6/MM3 (ref 3.77–5.28)
SODIUM SERPL-SCNC: 141 MMOL/L (ref 136–145)
TRIGL SERPL-MCNC: 60 MG/DL (ref 0–150)
VLDLC SERPL CALC-MCNC: 13 MG/DL (ref 5–40)
WBC # BLD AUTO: 7 10*3/MM3 (ref 3.4–10.8)

## 2023-06-02 NOTE — TELEPHONE ENCOUNTER
Rx Refill Note  Requested Prescriptions     Pending Prescriptions Disp Refills   • ferrous sulfate 325 (65 FE) MG tablet       Sig: Take 1 tablet by mouth Daily.      Last office visit with prescribing clinician: 5/31/2023   Last telemedicine visit with prescribing clinician: Visit date not found   Next office visit with prescribing clinician: Visit date not found         Birdie Rangel MA  06/02/23, 17:34 EDT

## 2023-06-05 RX ORDER — FERROUS SULFATE 325(65) MG
325 TABLET ORAL DAILY
Qty: 90 TABLET | Refills: 1 | Status: SHIPPED | OUTPATIENT
Start: 2023-06-05

## 2023-10-06 ENCOUNTER — OFFICE VISIT (OUTPATIENT)
Dept: INTERNAL MEDICINE | Facility: CLINIC | Age: 33
End: 2023-10-06
Payer: COMMERCIAL

## 2023-10-06 VITALS
HEIGHT: 65 IN | DIASTOLIC BLOOD PRESSURE: 84 MMHG | BODY MASS INDEX: 44.65 KG/M2 | WEIGHT: 268 LBS | SYSTOLIC BLOOD PRESSURE: 124 MMHG | HEART RATE: 78 BPM | OXYGEN SATURATION: 98 %

## 2023-10-06 DIAGNOSIS — M79.604 PAIN IN BOTH LOWER EXTREMITIES: Primary | ICD-10-CM

## 2023-10-06 DIAGNOSIS — M79.605 PAIN IN BOTH LOWER EXTREMITIES: Primary | ICD-10-CM

## 2023-10-06 PROCEDURE — 1159F MED LIST DOCD IN RCRD: CPT | Performed by: NURSE PRACTITIONER

## 2023-10-06 PROCEDURE — 1160F RVW MEDS BY RX/DR IN RCRD: CPT | Performed by: NURSE PRACTITIONER

## 2023-10-06 PROCEDURE — 99213 OFFICE O/P EST LOW 20 MIN: CPT | Performed by: NURSE PRACTITIONER

## 2023-10-06 NOTE — PROGRESS NOTES
"        Chief Complaint  Leg Pain     Subjective:      History of Present Illness {CC  Problem List  Visit  Diagnosis   Encounters  Notes  Medications  Labs  Result Review Imaging  Media :23}     Wanda Solis presents to [facility] for:      Pain to lower extremities.    This is been ongoing for greater than 6 months.  She does not have any pain with ambulation.  She works 12-hour shifts standing on her feet and does not feel like it worsens.  She has had foot pain in the past but states none recently.    Pain wakes her up from her sleep.  She states generally no pain when she lays on her back.  When she lays on her left side after a while she starts to get pain down her left leg.  Changes the right side same.    No wounds to lower extremities    No known PAD  Nonsmoker     She has added mattress topper and it has not helped.       Last visit: magnesium 2  Lab Results   Component Value Date    CHLPL 141 05/31/2023    TRIG 60 05/31/2023    HDL 38 (L) 05/31/2023    LDL 90 05/31/2023          I have reviewed patient's medical history, any new submitted information provided by patient or medical assistant and updated medical record.      Objective:      Physical Exam  Musculoskeletal:         General: No swelling or tenderness.      Right lower leg: No edema.      Left lower leg: No edema.      Comments: No pain in Les with elevation.    Skin:     General: Skin is warm and dry.      Result Review  Data Reviewed:{ Labs  Result Review  Imaging  Med Tab  Media :23}                Vital Signs:   /84 (BP Location: Left arm, Patient Position: Sitting, Cuff Size: Large Adult)   Pulse 78   Ht 165.1 cm (65\")   Wt 122 kg (268 lb)   SpO2 98%   BMI 44.60 kg/m²         Requested Prescriptions      No prescriptions requested or ordered in this encounter       Routine medications provided by this office will also be refilled via pharmacy request.       Current Outpatient " Medications:     benzoyl peroxide 10 % external wash, USE TO WASH AFFECTED AREAS ONCE DAILY AS DIRECTED, Disp: , Rfl:     ferrous sulfate 325 (65 FE) MG tablet, Take 1 tablet by mouth Daily., Disp: 90 tablet, Rfl: 1    vitamin D (ERGOCALCIFEROL) 1.25 MG (98210 UT) capsule capsule, Take 1 capsule by mouth Every 14 (Fourteen) Days., Disp: 12 capsule, Rfl: 0     Assessment and Plan:      Assessment and Plan {CC Problem List  Visit Diagnosis  ROS  Review (Popup)  Health Maintenance  Quality  BestPractice  Medications  SmartSets  SnapShot Encounters  Media :23}     Problem List Items Addressed This Visit    None  Visit Diagnoses       Pain in both lower extremities    -  Primary    Relevant Orders    Doppler Ankle Brachial Index Single Level CAR    Doppler Arterial Multi Level Lower Extremity - Bilateral CAR          Will get dopplers and notify her of results.   I feel low risk for PAD.  Likely compression as pain is ipsilateral when laying on affected side.   Advised off loading: large pillow behind her back so she is not completely on her side.     Increase exercise, work on weight loss.     Follow Up {Instructions Charge Capture  Follow-up Communications :23}     Return if symptoms worsen or fail to improve.      Patient was given instructions and counseling regarding her condition or for health maintenance advice. Please see specific information pulled into the AVS if appropriate.    Dragon disclaimer:   Much of this encounter note is an electronic transcription/translation of spoken language to printed text. The electronic translation of spoken language may permit erroneous, or at times, nonsensical words or phrases to be inadvertently transcribed; Although I have reviewed the note for such errors, some may still exist.     Additional Patient Counseling:       There are no Patient Instructions on file for this visit.

## 2023-11-15 ENCOUNTER — HOSPITAL ENCOUNTER (OUTPATIENT)
Dept: CARDIOLOGY | Facility: HOSPITAL | Age: 33
Discharge: HOME | End: 2023-11-15
Payer: COMMERCIAL

## 2023-11-15 DIAGNOSIS — M79.605 PAIN IN BOTH LOWER EXTREMITIES: ICD-10-CM

## 2023-11-15 DIAGNOSIS — M79.604 PAIN IN BOTH LOWER EXTREMITIES: ICD-10-CM

## 2023-11-15 LAB
BH CV LOWER ARTERIAL LEFT ABI RATIO: 1.26
BH CV LOWER ARTERIAL LEFT DORSALIS PEDIS SYS MAX: 136
BH CV LOWER ARTERIAL LEFT GREAT TOE SYS MAX: 112
BH CV LOWER ARTERIAL LEFT POST TIBIAL SYS MAX: 144
BH CV LOWER ARTERIAL LEFT TBI RATIO: 0.98
BH CV LOWER ARTERIAL RIGHT ABI RATIO: 1.16
BH CV LOWER ARTERIAL RIGHT DORSALIS PEDIS SYS MAX: 132
BH CV LOWER ARTERIAL RIGHT GREAT TOE SYS MAX: 98
BH CV LOWER ARTERIAL RIGHT POST TIBIAL SYS MAX: 111
BH CV LOWER ARTERIAL RIGHT TBI RATIO: 0.86
UPPER ARTERIAL LEFT ARM BRACHIAL SYS MAX: 111
UPPER ARTERIAL RIGHT ARM BRACHIAL SYS MAX: 114

## 2023-11-15 PROCEDURE — 93922 UPR/L XTREMITY ART 2 LEVELS: CPT

## 2023-12-17 ENCOUNTER — HOSPITAL ENCOUNTER (EMERGENCY)
Facility: HOSPITAL | Age: 33
Discharge: HOME | End: 2023-12-17
Attending: EMERGENCY MEDICINE | Admitting: EMERGENCY MEDICINE

## 2023-12-17 VITALS
HEART RATE: 76 BPM | DIASTOLIC BLOOD PRESSURE: 68 MMHG | TEMPERATURE: 97.5 F | SYSTOLIC BLOOD PRESSURE: 117 MMHG | OXYGEN SATURATION: 97 % | RESPIRATION RATE: 16 BRPM

## 2023-12-17 DIAGNOSIS — R55 NEAR SYNCOPE: ICD-10-CM

## 2023-12-17 DIAGNOSIS — R42 INTERMITTENT LIGHTHEADEDNESS: Primary | ICD-10-CM

## 2023-12-17 DIAGNOSIS — D64.9 MILD CHRONIC ANEMIA: ICD-10-CM

## 2023-12-17 LAB
ALBUMIN SERPL-MCNC: 3.8 G/DL (ref 3.5–5.2)
ALBUMIN/GLOB SERPL: 1.1 G/DL
ALP SERPL-CCNC: 70 U/L (ref 39–117)
ALT SERPL W P-5'-P-CCNC: 10 U/L (ref 1–33)
ANION GAP SERPL CALCULATED.3IONS-SCNC: 8 MMOL/L (ref 5–15)
AST SERPL-CCNC: 10 U/L (ref 1–32)
BASOPHILS # BLD AUTO: 0.02 10*3/MM3 (ref 0–0.2)
BASOPHILS NFR BLD AUTO: 0.3 % (ref 0–1.5)
BILIRUB SERPL-MCNC: 0.2 MG/DL (ref 0–1.2)
BUN SERPL-MCNC: 8 MG/DL (ref 6–20)
BUN/CREAT SERPL: 11.8 (ref 7–25)
CALCIUM SPEC-SCNC: 9.1 MG/DL (ref 8.6–10.5)
CHLORIDE SERPL-SCNC: 107 MMOL/L (ref 98–107)
CO2 SERPL-SCNC: 27 MMOL/L (ref 22–29)
CREAT SERPL-MCNC: 0.68 MG/DL (ref 0.57–1)
DEPRECATED RDW RBC AUTO: 44.8 FL (ref 37–54)
EGFRCR SERPLBLD CKD-EPI 2021: 116.6 ML/MIN/1.73
EOSINOPHIL # BLD AUTO: 0.22 10*3/MM3 (ref 0–0.4)
EOSINOPHIL NFR BLD AUTO: 3.3 % (ref 0.3–6.2)
ERYTHROCYTE [DISTWIDTH] IN BLOOD BY AUTOMATED COUNT: 15.9 % (ref 12.3–15.4)
GLOBULIN UR ELPH-MCNC: 3.4 GM/DL
GLUCOSE SERPL-MCNC: 102 MG/DL (ref 65–99)
HCG SERPL QL: NEGATIVE
HCT VFR BLD AUTO: 32.8 % (ref 34–46.6)
HGB BLD-MCNC: 10.4 G/DL (ref 12–15.9)
IMM GRANULOCYTES # BLD AUTO: 0.02 10*3/MM3 (ref 0–0.05)
IMM GRANULOCYTES NFR BLD AUTO: 0.3 % (ref 0–0.5)
LYMPHOCYTES # BLD AUTO: 2.53 10*3/MM3 (ref 0.7–3.1)
LYMPHOCYTES NFR BLD AUTO: 37.8 % (ref 19.6–45.3)
MCH RBC QN AUTO: 25 PG (ref 26.6–33)
MCHC RBC AUTO-ENTMCNC: 31.7 G/DL (ref 31.5–35.7)
MCV RBC AUTO: 78.8 FL (ref 79–97)
MONOCYTES # BLD AUTO: 0.4 10*3/MM3 (ref 0.1–0.9)
MONOCYTES NFR BLD AUTO: 6 % (ref 5–12)
NEUTROPHILS NFR BLD AUTO: 3.5 10*3/MM3 (ref 1.7–7)
NEUTROPHILS NFR BLD AUTO: 52.3 % (ref 42.7–76)
NRBC BLD AUTO-RTO: 0 /100 WBC (ref 0–0.2)
PLATELET # BLD AUTO: 328 10*3/MM3 (ref 140–450)
PMV BLD AUTO: 8.9 FL (ref 6–12)
POTASSIUM SERPL-SCNC: 4.3 MMOL/L (ref 3.5–5.2)
PROT SERPL-MCNC: 7.2 G/DL (ref 6–8.5)
QT INTERVAL: 357 MS
QTC INTERVAL: 412 MS
RBC # BLD AUTO: 4.16 10*6/MM3 (ref 3.77–5.28)
SODIUM SERPL-SCNC: 142 MMOL/L (ref 136–145)
WBC NRBC COR # BLD AUTO: 6.69 10*3/MM3 (ref 3.4–10.8)

## 2023-12-17 PROCEDURE — 93005 ELECTROCARDIOGRAM TRACING: CPT | Performed by: EMERGENCY MEDICINE

## 2023-12-17 PROCEDURE — 80053 COMPREHEN METABOLIC PANEL: CPT | Performed by: EMERGENCY MEDICINE

## 2023-12-17 PROCEDURE — 99283 EMERGENCY DEPT VISIT LOW MDM: CPT

## 2023-12-17 PROCEDURE — 85025 COMPLETE CBC W/AUTO DIFF WBC: CPT | Performed by: EMERGENCY MEDICINE

## 2023-12-17 PROCEDURE — 84703 CHORIONIC GONADOTROPIN ASSAY: CPT | Performed by: EMERGENCY MEDICINE

## 2023-12-17 NOTE — Clinical Note
[facility] EMERGENCY DEPARTMENT  [address]  [facility] [address]  Phone: [phone]    Wanda Solis was seen and treated in our emergency department on 12/17/2023.  She may return to work on 12/18/2023.         Thank you for choosing [facility].    Carl Duong MD

## 2023-12-17 NOTE — ED PROVIDER NOTES
" EMERGENCY DEPARTMENT ENCOUNTER    Room Number:  22/22  PCP: Barak Greenberg III, NP-C  Historian: Patient      HPI:  Chief Complaint: Dizziness  A complete HPI/ROS/PMH/PSH/SH/FH are unobtainable due to: Nothing  Context: Wanda Solis is a 33 y.o. female who presents to the ED c/o intermittent dizziness for the past 1 week.  Patient was walking up some stairs 1 week ago when she felt dizzy.  She fell forward but did not hit her head.  Since then, she complains of intermittent dizziness and lightheadedness.  Symptoms are worse when she stands up and when she is moving.  She also complains of ringing in both ears.  States that her head feels \"tight\" but she denies headache.  Denies nausea, vomiting, vision changes, chest pain, shortness of breath, palpitations, or numbness/tingling/weakness in extremities.  Earlier today when she was sitting at a table eating lunch, she felt lightheaded and faint.  She did not pass out.  Denies recent illness, cough, or fever.  LMP was several weeks ago.            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    Positive GBS test 11/20/2017    Near syncope 06/27/2018    Mastitis 09/01/2018    Iron deficiency anemia secondary to inadequate dietary iron intake 02/02/2019    Hidradenitis 08/31/2019    Vitamin D deficiency 05/31/2023     Resolved Ambulatory Problems     Diagnosis Date Noted    Obesity affecting pregnancy in third trimester 12/14/2017    Obesity in pregnancy, antepartum, third trimester 12/14/2017    Postpartum care and examination immediately after delivery 12/15/2017    Breast abscess during pregnancy, antepartum 09/01/2018    Influenza A 01/22/2019    Influenza A 01/23/2019    Supervision of normal pregnancy in third trimester 02/01/2019    Normal vaginal delivery 02/01/2019     Past Medical History:   Diagnosis Date    Abnormal Pap smear of cervix     Anemia     Hidradenitis suppurativa     " Urogenital trichomoniasis          PAST SURGICAL HISTORY  Past Surgical History:   Procedure Laterality Date    DILATATION AND CURETTAGE      INCISION AND DRAINAGE PERIRECTAL ABSCESS Left 9/1/2018    Procedure: INCISION AND DRAINAGE OF LEFT BREAST;  Surgeon: Parvez Long MD;  Location: [facility] OR;  Service: General    WISDOM TOOTH EXTRACTION           FAMILY HISTORY  Family History   Problem Relation Age of Onset    No Known Problems Mother     Heart disease Father     Breast cancer Neg Hx     Ovarian cancer Neg Hx     Uterine cancer Neg Hx     Colon cancer Neg Hx     Deep vein thrombosis Neg Hx     Pulmonary embolism Neg Hx          SOCIAL HISTORY  Social History     Socioeconomic History    Marital status: Single   Tobacco Use    Smoking status: Never    Smokeless tobacco: Never   Substance and Sexual Activity    Alcohol use: No    Drug use: No    Sexual activity: Not Currently     Partners: Male     Birth control/protection: None         ALLERGIES  Patient has no known allergies.    REVIEW OF SYSTEMS  All systems have been reviewed and are negative except for those listed in the HPI      PHYSICAL EXAM  ED Triage Vitals   Temp Heart Rate Resp BP SpO2   12/17/23 1337 12/17/23 1337 12/17/23 1337 12/17/23 1338 12/17/23 1337   97.5 °F (36.4 °C) 100 16 146/83 97 %      Temp src Heart Rate Source Patient Position BP Location FiO2 (%)   12/17/23 1337 12/17/23 1337 -- -- --   Tympanic Monitor          Physical Exam      GENERAL: Awake, alert, oriented x 3.  Well-developed, well-nourished female.  Resting comfortably in no acute distress  HENT: NCAT, nares patent, TMs are normal bilaterally  EYES: PERRL, EOMI, no nystagmus  CV: regular rhythm, normal rate  RESPIRATORY: normal effort, clear to auscultation bilaterally  ABDOMEN: soft, nondistended, nontender  MUSCULOSKELETAL: Extremities are nontender with full range of motion  NEURO: Speech is clear and fluent.  No expressive or receptive aphasia.  Tongue protrudes  midline.  No facial droop.  Normal strength and light touch sensation in all extremities.  Normal finger-to-nose and heel-to-shin testing bilaterally.  PSYCH:  calm, cooperative  SKIN: warm, dry    Vital signs and nursing notes reviewed.          LAB RESULTS  Recent Results (from the past 24 hour(s))   Comprehensive Metabolic Panel    Collection Time: 12/17/23  2:03 PM    Specimen: Blood   Result Value Ref Range    Glucose 102 (H) 65 - 99 mg/dL    BUN 8 6 - 20 mg/dL    Creatinine 0.68 0.57 - 1.00 mg/dL    Sodium 142 136 - 145 mmol/L    Potassium 4.3 3.5 - 5.2 mmol/L    Chloride 107 98 - 107 mmol/L    CO2 27.0 22.0 - 29.0 mmol/L    Calcium 9.1 8.6 - 10.5 mg/dL    Total Protein 7.2 6.0 - 8.5 g/dL    Albumin 3.8 3.5 - 5.2 g/dL    ALT (SGPT) 10 1 - 33 U/L    AST (SGOT) 10 1 - 32 U/L    Alkaline Phosphatase 70 39 - 117 U/L    Total Bilirubin 0.2 0.0 - 1.2 mg/dL    Globulin 3.4 gm/dL    A/G Ratio 1.1 g/dL    BUN/Creatinine Ratio 11.8 7.0 - 25.0    Anion Gap 8.0 5.0 - 15.0 mmol/L    eGFR 116.6 >60.0 mL/min/1.73   hCG, Serum, Qualitative    Collection Time: 12/17/23  2:03 PM    Specimen: Blood   Result Value Ref Range    HCG Qualitative Negative Negative   CBC Auto Differential    Collection Time: 12/17/23  2:03 PM    Specimen: Blood   Result Value Ref Range    WBC 6.69 3.40 - 10.80 10*3/mm3    RBC 4.16 3.77 - 5.28 10*6/mm3    Hemoglobin 10.4 (L) 12.0 - 15.9 g/dL    Hematocrit 32.8 (L) 34.0 - 46.6 %    MCV 78.8 (L) 79.0 - 97.0 fL    MCH 25.0 (L) 26.6 - 33.0 pg    MCHC 31.7 31.5 - 35.7 g/dL    RDW 15.9 (H) 12.3 - 15.4 %    RDW-SD 44.8 37.0 - 54.0 fl    MPV 8.9 6.0 - 12.0 fL    Platelets 328 140 - 450 10*3/mm3    Neutrophil % 52.3 42.7 - 76.0 %    Lymphocyte % 37.8 19.6 - 45.3 %    Monocyte % 6.0 5.0 - 12.0 %    Eosinophil % 3.3 0.3 - 6.2 %    Basophil % 0.3 0.0 - 1.5 %    Immature Grans % 0.3 0.0 - 0.5 %    Neutrophils, Absolute 3.50 1.70 - 7.00 10*3/mm3    Lymphocytes, Absolute 2.53 0.70 - 3.10 10*3/mm3    Monocytes,  Absolute 0.40 0.10 - 0.90 10*3/mm3    Eosinophils, Absolute 0.22 0.00 - 0.40 10*3/mm3    Basophils, Absolute 0.02 0.00 - 0.20 10*3/mm3    Immature Grans, Absolute 0.02 0.00 - 0.05 10*3/mm3    nRBC 0.0 0.0 - 0.2 /100 WBC   ECG 12 Lead Other; Dizziness, near syncope    Collection Time: 12/17/23  2:07 PM   Result Value Ref Range    QT Interval 357 ms    QTC Interval 412 ms       Ordered the above labs and reviewed the results.        RADIOLOGY  No Radiology Exams Resulted Within Past 24 Hours    Ordered the above noted radiological studies. Reviewed by me in PACS.            PROCEDURES  Procedures              MEDICATIONS GIVEN IN ER  Medications - No data to display                MEDICAL DECISION MAKING, PROGRESS, and CONSULTS    All labs have been independently reviewed by me.  All radiology studies have been reviewed by me and I have also reviewed the radiology report.   EKG's independently viewed and interpreted by me.  Discussion below represents my analysis of pertinent findings related to patient's condition, differential diagnosis, treatment plan and final disposition.      Additional sources:  - Discussed/ obtained information from independent historians: None    - External (non-ED) record review: Patient was seen in another ED in January 2023 for a left thigh abscess.  She was last admitted here in February 2019 for a scheduled induction with subsequent vaginal delivery.  Hemoglobin was 10.5 in May 2023    - Chronic or social conditions impacting care: None          Orders placed during this visit:  Orders Placed This Encounter   Procedures    Comprehensive Metabolic Panel    hCG, Serum, Qualitative    CBC Auto Differential    Monitor Blood Pressure    Orthostatic Vitals    Pulse Oximetry, Continuous    ECG 12 Lead Other; Dizziness, near syncope    CBC & Differential         Additional orders considered but not ordered:  None        Differential diagnosis:    Anemia, BPPV, labyrinthitis, vestibular neuritis,  orthostasis, vasovagal episode      Independent interpretation of labs, radiology studies, and discussions with consultants:  ED Course as of 12/17/23 1635   Sun Dec 17, 2023   1411 EKG personally interpreted by me at 1410.  My personal interpretation is:         EKG time: 1407  Rhythm/Rate: Sinus rhythm, rate 80  P waves and MN: Normal  QRS, axis: Normal  ST and T waves: Normal  QT interval is normal    Interpreted Contemporaneously by me, independently viewed  EKG is not changed compared to prior EKG done on 6/26/2018   [WH]   1445 Glucose(!): 102 [WH]   1445 Creatinine: 0.68 [WH]   1445 WBC: 6.69 [WH]   1446 Hemoglobin(!): 10.4  Stable [WH]   1454 HCG Qualitative: Negative [WH]   1454 Orthostatics are normal [WH]   1603 Patient is resting comfortably.  Test results were discussed with her.  She was advised to follow-up with her PCP if symptoms persist.  Return precautions were discussed. [WH]   1634 MDM: Patient presented to ED complaining of intermittent dizziness for the past week or so.  She also had a near syncopal episode earlier today.  Her neuroexam is nonfocal.  She was not orthostatic.  She has chronic anemia which is stable.  Labs were unremarkable.  EKG did not have any acute changes.  Symptoms may be due to BPPV.  I have low suspicion of TIA/CVA. [WH]      ED Course User Index  [WH] Carl Duong MD               DIAGNOSIS  Final diagnoses:   Intermittent lightheadedness   Near syncope   Mild chronic anemia         DISPOSITION  DISCHARGE    Patient discharged in stable condition.    Reviewed implications of results, diagnosis, meds, responsibility to follow up, warning signs and symptoms of possible worsening, potential complications and reasons to return to ER, including worsening/persistent symptoms, headache, fainting, or other concern.    Patient/Family voiced understanding of above instructions.    Discussed plan for discharge, as there is no emergent indication for admission. Patient  referred to primary care provider for BP management due to today's BP. Pt/family is agreeable and understands need for follow up and repeat testing.  Pt is aware that discharge does not mean that nothing is wrong but it indicates no emergency is present that requires admission and they must continue care with follow-up as given below or physician of their choice.     FOLLOW-UP  Barak Greenberg III, NP-C  [address] [address]  [phone]    Schedule an appointment as soon as possible for a visit   If symptoms persist         Medication List      No changes were made to your prescriptions during this visit.                   Latest Documented Vital Signs:  As of 16:35 EST  BP- 117/68 HR- 76 Temp- 97.5 °F (36.4 °C) (Tympanic) O2 sat- 97%              --    Please note that portions of this were completed with a voice recognition program.       Note Disclaimer: At [facility], we believe that sharing information builds trust and better relationships. You are receiving this note because you are receiving care at [facility] or recently visited. It is possible you will see health information before a provider has talked with you about it. This kind of information can be easy to misunderstand. To help you fully understand what it means for your health, we urge you to discuss this note with your provider.             Carl Duong MD  12/17/23 [id number]

## 2023-12-17 NOTE — DISCHARGE INSTRUCTIONS
Follow-up with your primary care doctor if symptoms persist.  Return to the emergency department for worsening/persistent symptoms, nausea, vomiting, trouble walking, fainting, chest pain, shortness of breath, or other concern.

## 2023-12-26 ENCOUNTER — TELEPHONE (OUTPATIENT)
Dept: INTERNAL MEDICINE | Facility: CLINIC | Age: 33
End: 2023-12-26

## 2023-12-26 NOTE — TELEPHONE ENCOUNTER
Patient called to schedule appointment for Dizziness/Headaches and ER follow-Up. However, patients insurance does not start until the 1st of the year so she is scheduled 1/2/24 to discuss.

## 2023-12-27 ENCOUNTER — OFFICE VISIT (OUTPATIENT)
Dept: INTERNAL MEDICINE | Facility: CLINIC | Age: 33
End: 2023-12-27
Payer: COMMERCIAL

## 2023-12-27 VITALS
WEIGHT: 262.6 LBS | HEIGHT: 65 IN | SYSTOLIC BLOOD PRESSURE: 112 MMHG | DIASTOLIC BLOOD PRESSURE: 74 MMHG | TEMPERATURE: 98.2 F | HEART RATE: 88 BPM | BODY MASS INDEX: 43.75 KG/M2 | OXYGEN SATURATION: 100 %

## 2023-12-27 DIAGNOSIS — H69.92 EUSTACHIAN TUBE DISORDER, LEFT: ICD-10-CM

## 2023-12-27 DIAGNOSIS — H65.02 ACUTE SEROUS OTITIS MEDIA OF LEFT EAR, RECURRENCE NOT SPECIFIED: Primary | ICD-10-CM

## 2023-12-27 PROCEDURE — 99213 OFFICE O/P EST LOW 20 MIN: CPT | Performed by: NURSE PRACTITIONER

## 2023-12-27 RX ORDER — FLUTICASONE PROPIONATE 50 MCG
1 SPRAY, SUSPENSION (ML) NASAL 2 TIMES DAILY
Qty: 9.9 ML | Refills: 0 | Status: SHIPPED | OUTPATIENT
Start: 2023-12-27

## 2023-12-27 RX ORDER — AMOXICILLIN 500 MG/1
500 CAPSULE ORAL 3 TIMES DAILY
Qty: 21 CAPSULE | Refills: 0 | Status: SHIPPED | OUTPATIENT
Start: 2023-12-27 | End: 2024-01-03

## 2023-12-27 NOTE — PROGRESS NOTES
"        Chief Complaint  Dizziness (Started 2 weeks ago, passed out 3 times ) and Ear Fullness (Left ear )     Subjective:      History of Present Illness {CC  Problem List  Visit  Diagnosis   Encounters  Notes  Medications  Labs  Result Review Imaging  Media :23}     Wanda Solis presents to [facility] for:      Dizziness: started 2 weeks ago.   Went to ER 12/17/23  ED Provider Notes by Carl Duong MD (12/17/2023 1:51 PM)   Note reviewed:     \" Patient was walking up some stairs 1 week ago when she felt dizzy.  She fell forward but did not hit her head.  Since then, she complains of intermittent dizziness and lightheadedness.  Symptoms are worse when she stands up and when she is moving.  She also complains of ringing in both ears.  States that her head feels \"tight\" but she denies headache.  Denies nausea, vomiting, vision changes, chest pain, shortness of breath, palpitations, or numbness/tingling/weakness in extremities.  Earlier today when she was sitting at a table eating lunch, she felt lightheaded and faint.  She did not pass out.  Denies recent illness, cough, or fever.  LMP was several weeks ago. \"      Today: states had ear pain in left.  States no one has looked in her ears.   No change in vision or HA.  Can not valsalva left.     Anemia: 6/1/23 advised her to continue iron but I could not see that she had had a refill.   Ferritin 29  States she is taking iron and due to  refill today.   Hgb was stable for her in the ER.  Pt states no blood work was done in the ER.         I have reviewed patient's medical history, any new submitted information provided by patient or medical assistant and updated medical record.      Objective:      Physical Exam  HENT:      Left Ear: A middle ear effusion is present. Tympanic membrane is injected.      Mouth/Throat:      Pharynx: No oropharyngeal exudate.   Eyes:      Extraocular Movements:      Right eye: No " "nystagmus.      Left eye: No nystagmus.   Cardiovascular:      Rate and Rhythm: Normal rate and regular rhythm.      Pulses: Normal pulses.      Heart sounds: Normal heart sounds.   Pulmonary:      Effort: Pulmonary effort is normal.      Breath sounds: Normal breath sounds.   Neurological:      Mental Status: She is oriented to person, place, and time.        Result Review  Data Reviewed:{ Labs  Result Review  Imaging  Med Tab  Media :23}     The following data was reviewed by: Barak Greenberg III, NP-C on 2023  Common labs          2023    11:14 2023    14:03   Common Labs   Glucose 89  102    BUN 10  8    Creatinine 0.65  0.68    Sodium 141  142    Potassium 4.1  4.3    Chloride 105  107    Calcium 9.2  9.1    Total Protein 7.2     Albumin 3.8  3.8    Total Bilirubin 0.3  0.2    Alkaline Phosphatase 73  70    AST (SGOT) 11  10    ALT (SGPT) 12  10    WBC 7.00  6.69    Hemoglobin 10.5  10.4    Hematocrit 32.1  32.8    Platelets 343  328    Total Cholesterol 141     Triglycerides 60     HDL Cholesterol 38     LDL Cholesterol  90              Vital Signs:   /74 (BP Location: Left arm, Patient Position: Sitting, Cuff Size: Adult)   Pulse 88   Temp 98.2 °F (36.8 °C) (Oral)   Ht 165.1 cm (65\")   Wt 119 kg (262 lb 9.6 oz)   SpO2 100%   BMI 43.70 kg/m²         Requested Prescriptions     Signed Prescriptions Disp Refills    amoxicillin (AMOXIL) 500 MG capsule 21 capsule 0     Sig: Take 1 capsule by mouth 3 (Three) Times a Day for 7 days.    fluticasone (FLONASE) 50 MCG/ACT nasal spray 9.9 mL 0     Si spray into the nostril(s) as directed by provider 2 (Two) Times a Day.       Routine medications provided by this office will also be refilled via pharmacy request.       Current Outpatient Medications:     benzoyl peroxide 10 % external wash, USE TO WASH AFFECTED AREAS ONCE DAILY AS DIRECTED, Disp: , Rfl:     ferrous sulfate 325 (65 FE) MG tablet, Take 1 tablet by mouth " Daily., Disp: 90 tablet, Rfl: 1    vitamin D (ERGOCALCIFEROL) 1.25 MG (64503 UT) capsule capsule, Take 1 capsule by mouth Every 14 (Fourteen) Days., Disp: 12 capsule, Rfl: 0    amoxicillin (AMOXIL) 500 MG capsule, Take 1 capsule by mouth 3 (Three) Times a Day for 7 days., Disp: 21 capsule, Rfl: 0    fluticasone (FLONASE) 50 MCG/ACT nasal spray, 1 spray into the nostril(s) as directed by provider 2 (Two) Times a Day., Disp: 9.9 mL, Rfl: 0     Assessment and Plan:      Assessment and Plan {CC Problem List  Visit Diagnosis  ROS  Review (Popup)  Health Maintenance  Quality  BestPractice  Medications  SmartSets  SnapShot Encounters  Media :23}     Problem List Items Addressed This Visit    None  Visit Diagnoses       Acute serous otitis media of left ear, recurrence not specified    -  Primary    Relevant Medications    amoxicillin (AMOXIL) 500 MG capsule    Eustachian tube disorder, left        Relevant Medications    fluticasone (FLONASE) 50 MCG/ACT nasal spray          Will treat with amoxicillin.   Continue iron supplement.     Follow Up {Instructions Charge Capture  Follow-up Communications :23}     Return if symptoms worsen or fail to improve.      Patient was given instructions and counseling regarding her condition or for health maintenance advice. Please see specific information pulled into the AVS if appropriate.    Dragon disclaimer:   Much of this encounter note is an electronic transcription/translation of spoken language to printed text. The electronic translation of spoken language may permit erroneous, or at times, nonsensical words or phrases to be inadvertently transcribed; Although I have reviewed the note for such errors, some may still exist.     Additional Patient Counseling:       There are no Patient Instructions on file for this visit.

## 2023-12-27 NOTE — LETTER
December 27, 2023     Patient: Wanda Solis   YOB: 1990   Date of Visit: 12/27/2023       To Whom It May Concern:    It is my medical opinion that Wanda Solis may return to work in two days.  She was seen in my office today.        Sincerely,        Barak Greenberg III, NP-C    CC: No Recipients

## 2024-01-04 ENCOUNTER — TELEPHONE (OUTPATIENT)
Dept: INTERNAL MEDICINE | Facility: CLINIC | Age: 34
End: 2024-01-04
Payer: COMMERCIAL

## 2024-01-04 DIAGNOSIS — H92.02 EAR PAIN, LEFT: Primary | ICD-10-CM

## 2024-01-04 DIAGNOSIS — R42 DIZZINESS: ICD-10-CM

## 2024-01-04 NOTE — TELEPHONE ENCOUNTER
Name: Wanda Solis      Relationship: Self      Best Callback Number: [phone]       HUB PROVIDED THE RELAY MESSAGE FROM THE OFFICE      PATIENT: VOICED UNDERSTANDING AND HAS NO FURTHER QUESTIONS AT THIS TIME    ADDITIONAL INFORMATION:PATIENT WANTS THE REFERRAL TO ENT

## 2024-01-04 NOTE — TELEPHONE ENCOUNTER
Please call and tell her I'd advise her to be seen by ENT.      She may have something inner ear causing dizziness.   If she agrees, let me know and I will place referral to advanced ent.      MAME FELDMAN OKAY TO READ MESSAGE ABOVE.      ----- Message from INGRID Mckinley III sent at 1/3/2024  4:50 PM EST -----  Regarding: FW: Question   Contact: [phone]        ----- Message -----  From: Xu Bautista MA  Sent: 1/3/2024   3:35 PM EST  To: INGRID Mckinley III  Subject: FW: Question                                       ----- Message -----  From: Wanda Solis  Sent: 1/3/2024   8:05 AM EST  To: Yuli Plata [facility]  Subject: Question                                         Good morning  today my last day taking the antibiotic. So I’m I still suppose to be feeling off balance and dizzy? I still don’t feel better my ear doesn’t have any pressure anymore.

## 2024-01-29 DIAGNOSIS — H69.92 EUSTACHIAN TUBE DISORDER, LEFT: ICD-10-CM

## 2024-01-30 RX ORDER — FLUTICASONE PROPIONATE 50 MCG
SPRAY, SUSPENSION (ML) NASAL
Qty: 16 G | OUTPATIENT
Start: 2024-01-30

## 2024-03-12 ENCOUNTER — OFFICE VISIT (OUTPATIENT)
Dept: INTERNAL MEDICINE | Facility: CLINIC | Age: 34
End: 2024-03-12
Payer: COMMERCIAL

## 2024-03-12 VITALS
SYSTOLIC BLOOD PRESSURE: 118 MMHG | HEIGHT: 65 IN | OXYGEN SATURATION: 98 % | HEART RATE: 61 BPM | WEIGHT: 263.5 LBS | DIASTOLIC BLOOD PRESSURE: 82 MMHG | BODY MASS INDEX: 43.9 KG/M2

## 2024-03-12 DIAGNOSIS — R42 DIZZINESS: Primary | ICD-10-CM

## 2024-03-12 DIAGNOSIS — F51.01 PRIMARY INSOMNIA: ICD-10-CM

## 2024-03-12 PROCEDURE — 99214 OFFICE O/P EST MOD 30 MIN: CPT | Performed by: NURSE PRACTITIONER

## 2024-03-12 RX ORDER — MECLIZINE HYDROCHLORIDE 25 MG/1
25 TABLET ORAL 2 TIMES DAILY PRN
COMMUNITY
Start: 2024-03-09

## 2024-03-12 RX ORDER — TRAZODONE HYDROCHLORIDE 50 MG/1
50 TABLET ORAL NIGHTLY
Qty: 30 TABLET | Refills: 0 | Status: SHIPPED | OUTPATIENT
Start: 2024-03-12

## 2024-03-12 NOTE — PROGRESS NOTES
"        Chief Complaint  Balance Issues (Started at the end of December. )     Subjective:      History of Present Illness {CC  Problem List  Visit  Diagnosis   Encounters  Notes  Medications  Labs  Result Review Imaging  Media :23}     Wanda Solis presents to [facility] for:   Hospial follow up:     Very pleasant female that works for [employer].     She presented to [facility]  3/9/2024  \"Sensation of falling\"   Sensation of falling backward.  No HA.   No change in vision.   No change in B/B - no incontinence.         Dizziness  Episode onset: since December.     She states symptoms have not improved since December.   States she can't  shower, has to take baths. Afraid she'll fall.     When walking has to hold on because she's afraid she'll fall.     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   States when she goes in patient's room that is dark, feels like she will fall.     Doesn't recall being ill in December.   No change in medications.   No change in glasses/ vision.      Prior workup:    12/17/23: ER  ED Provider Notes by Carl Duong MD (12/17/2023 1:51 PM)     Intermittent dizziness. Has not passed out.   EKG: SR    12/28/23: ear fullness left ear: treated with abx.   No improvement     2/8/24: ENT: Martha Seymour   Felt related to needing a root canal: she had on Feb 20th.   Advised to follow up for VNG if symptoms turned into vertigo   See media for note.     3/9/24 ER: [facility]    CT head: no acute abnormality   Treated for vestibular neuronitis/ labyrinthitis: discharged with meclizine     She has taken since Sunday but hasn't notice any improvement.       Lab work:   Hgb: 10.4, MCV 78.8  Most recent hgb: 11 on 3/9/24 with MCV 76.6  She is on iron supplement - states misses occasionally.      Change in sleep: last year states she only sleeps 1-2 hours at time.     Next menstrual " cycle will be Sunday: states are normal.       I have reviewed patient's medical history, any new submitted information provided by patient or medical assistant and updated medical record.      Objective:      Physical Exam  Vitals reviewed.   Constitutional:       Appearance: Normal appearance. She is well-developed.   HENT:      Right Ear: Tympanic membrane normal.      Left Ear: Tympanic membrane normal.   Eyes:      Pupils: Pupils are equal, round, and reactive to light.   Neck:      Thyroid: No thyromegaly.   Cardiovascular:      Rate and Rhythm: Normal rate and regular rhythm.      Pulses: Normal pulses.      Heart sounds: Normal heart sounds.   Pulmonary:      Effort: Pulmonary effort is normal.      Breath sounds: Normal breath sounds.      Comments: E/U   Musculoskeletal:         General: Normal range of motion.      Cervical back: Normal range of motion and neck supple.   Lymphadenopathy:      Cervical: No cervical adenopathy.   Skin:     General: Skin is warm and dry.      Capillary Refill: Capillary refill takes 2 to 3 seconds.   Neurological:      General: No focal deficit present.      Mental Status: She is alert and oriented to person, place, and time.      Sensory: No sensory deficit.      Motor: No weakness, tremor or pronator drift.      Coordination: Heel to Shin Test normal.      Gait: Gait abnormal.   Psychiatric:         Mood and Affect: Mood normal.         Behavior: Behavior normal. Behavior is cooperative.         Thought Content: Thought content normal.         Judgment: Judgment normal.        Result Review  Data Reviewed:{ Labs  Result Review  Imaging  Med Tab  Media :23}                  DATE: 3/9/2024 11:50     EXAMINATION: CT Head WO     PROVIDED INDICATION: Gait/Balance Disturbance: Headache     COMPARISON: None     TECHNIQUE: A routine noncontrast head CT was performed in the axial plane. Sagittal and coronal reformats were obtained. Dose reduction techniques were used.  "    FINDINGS:   Major findings:   No acute intracranial hemorrhage, space-occupying mass, mass effect, midline shift, hydrocephalus or cortical based acute stroke.     Minor findings:   The ventricles and sulci are appropriate for age. Normal gray-white matter differentiation is noted. Basal ganglia, brainstem and posterior fossa are grossly unremarkable.     Incidental findings:   Limited imaging of the orbits and ocular structures are unremarkable. Paranasal sinuses and mastoid air cells do not demonstrate any significant abnormality.     IMPRESSION:   No demonstrable acute intracranial abnormality.     Vital Signs:   /82 (BP Location: Left arm, Patient Position: Sitting, Cuff Size: Adult)   Pulse 61   Ht 165.1 cm (65\")   Wt 120 kg (263 lb 8 oz)   SpO2 98%   BMI 43.85 kg/m²         Requested Prescriptions     Signed Prescriptions Disp Refills    traZODone (DESYREL) 50 MG tablet 30 tablet 0     Sig: Take 1 tablet by mouth Every Night.       Routine medications provided by this office will also be refilled via pharmacy request.       Current Outpatient Medications:     benzoyl peroxide 10 % external wash, USE TO WASH AFFECTED AREAS ONCE DAILY AS DIRECTED, Disp: , Rfl:     ferrous sulfate 325 (65 FE) MG tablet, Take 1 tablet by mouth Daily., Disp: 90 tablet, Rfl: 1    fluticasone (FLONASE) 50 MCG/ACT nasal spray, 1 spray into the nostril(s) as directed by provider 2 (Two) Times a Day., Disp: 9.9 mL, Rfl: 0    meclizine (ANTIVERT) 25 MG tablet, Take 1 tablet by mouth 2 (Two) Times a Day As Needed for Dizziness., Disp: , Rfl:     vitamin D (ERGOCALCIFEROL) 1.25 MG (48420 UT) capsule capsule, Take 1 capsule by mouth Every 14 (Fourteen) Days., Disp: 12 capsule, Rfl: 0    traZODone (DESYREL) 50 MG tablet, Take 1 tablet by mouth Every Night., Disp: 30 tablet, Rfl: 0     Assessment and Plan:      Assessment and Plan {CC Problem List  Visit Diagnosis  ROS  Review (Popup)  Health Maintenance  Quality  " BestPractice  Medications  [facility]  SnapShot Encounters  Media :23}     Problem List Items Addressed This Visit    None  Visit Diagnoses       Dizziness    -  Primary    Relevant Orders    Ambulatory Referral to Neurology    Primary insomnia        Relevant Medications    traZODone (DESYREL) 50 MG tablet          Dizziness: on going since December.   She has been to ER twice and ENT without identifying cause or improvement.    Will refer to neurology for further evaluation.     I spent 33 minutes caring for Wanda on this date of service. This time includes time spent by me in the following activities: preparing for the visit, reviewing tests, obtaining and/or reviewing a separately obtained history, performing a medically appropriate examination and/or evaluation, counseling and educating the patient/family/caregiver, ordering medications, tests, or procedures, referring and communicating with other health care professionals, documenting information in the medical record, and independently interpreting results and communicating that information with the patient/family/caregiver     Follow Up {Instructions Charge Capture  Follow-up Communications :23}     Return if symptoms worsen or fail to improve.      Patient was given instructions and counseling regarding her condition or for health maintenance advice. Please see specific information pulled into the AVS if appropriate.    [place] disclaimer:   Much of this encounter note is an electronic transcription/translation of spoken language to printed text. The electronic translation of spoken language may permit erroneous, or at times, nonsensical words or phrases to be inadvertently transcribed; Although I have reviewed the note for such errors, some may still exist.     Additional Patient Counseling:       There are no Patient Instructions on file for this visit.

## 2024-03-20 ENCOUNTER — TELEPHONE (OUTPATIENT)
Dept: NEUROLOGY | Facility: CLINIC | Age: 34
End: 2024-03-20
Payer: COMMERCIAL

## 2024-03-20 NOTE — TELEPHONE ENCOUNTER
Spoke to patient in regards to rescheduling appt due to provider being out of office. Had agreed to reschedule for April 17th. Sent mail reminder and Mychart message

## 2024-03-25 ENCOUNTER — TELEPHONE (OUTPATIENT)
Dept: INTERNAL MEDICINE | Facility: CLINIC | Age: 34
End: 2024-03-25
Payer: COMMERCIAL

## 2024-03-25 NOTE — TELEPHONE ENCOUNTER
Caller: Wanda Solis    Relationship to patient: Self    Best call back number: [phone]    Patient is needing:     CALLING TO CHECK THE STATUS OF FMLA PAPERWORK.

## 2024-04-08 ENCOUNTER — TELEPHONE (OUTPATIENT)
Dept: INTERNAL MEDICINE | Facility: CLINIC | Age: 34
End: 2024-04-08
Payer: COMMERCIAL

## 2024-04-08 NOTE — TELEPHONE ENCOUNTER
Caller: Wanda Solis    Relationship: Self    Best call back number: [phone]     What is the best time to reach you: ANYTIME    Who are you requesting to speak with (clinical staff, provider,  specific staff member): CLINICAL    What was the call regarding: HAS A CYST THAT NEEDS TO BE LANCED AND IS WANTING TO MAKE SURE THAT DOE PALMER DOES THIS BEFORE COMING IN TO OFFICE.    Is it okay if the provider responds through [employer]: YES

## 2024-04-11 DIAGNOSIS — F51.01 PRIMARY INSOMNIA: ICD-10-CM

## 2024-04-11 RX ORDER — TRAZODONE HYDROCHLORIDE 50 MG/1
50 TABLET ORAL NIGHTLY
Qty: 30 TABLET | Refills: 0 | OUTPATIENT
Start: 2024-04-11

## 2024-04-11 NOTE — TELEPHONE ENCOUNTER
Called patient to get next appointment scheduled to get medication refilled. Patient declined to get her physical scheduled. Doesn't need medication refilled.

## 2024-04-15 ENCOUNTER — TELEPHONE (OUTPATIENT)
Dept: NEUROLOGY | Facility: CLINIC | Age: 34
End: 2024-04-15
Payer: COMMERCIAL

## 2024-04-15 NOTE — TELEPHONE ENCOUNTER
LVM in regards to reschedule appt due to provider being out of office. Had reschedule appt for May 15th at 11:30 AM. Left a Phizzbot Message.

## 2024-04-29 RX ORDER — ERGOCALCIFEROL 1.25 MG/1
CAPSULE ORAL
Qty: 12 CAPSULE | Refills: 0 | Status: SHIPPED | OUTPATIENT
Start: 2024-04-29

## 2024-04-29 NOTE — TELEPHONE ENCOUNTER
Rx Refill Note  Requested Prescriptions     Pending Prescriptions Disp Refills    vitamin D (ERGOCALCIFEROL) 1.25 MG (54864 UT) capsule capsule [Pharmacy Med Name: VITAMIN D2 50,000IU (ERGO) CAP RX] 12 capsule 0     Sig: TAKE ONE CAPSULE BY MOUTH BY MOUTH EVERY 14(FOURTEEN) DAYS      Last office visit with prescribing clinician: 3/12/2024   Last telemedicine visit with prescribing clinician: Visit date not found   Next office visit with prescribing clinician: Visit date not found                         Would you like a call back once the refill request has been completed: [] Yes [] No    If the office needs to give you a call back, can they leave a voicemail: [] Yes [] No    Wanda Brody  04/29/24, 08:20 EDT

## 2024-05-06 ENCOUNTER — TELEPHONE (OUTPATIENT)
Dept: OBSTETRICS AND GYNECOLOGY | Facility: CLINIC | Age: 34
End: 2024-05-06

## 2024-05-15 ENCOUNTER — LAB (OUTPATIENT)
Dept: LAB | Facility: HOSPITAL | Age: 34
End: 2024-05-15
Payer: COMMERCIAL

## 2024-05-15 ENCOUNTER — OFFICE VISIT (OUTPATIENT)
Dept: NEUROLOGY | Facility: CLINIC | Age: 34
End: 2024-05-15
Payer: COMMERCIAL

## 2024-05-15 VITALS
DIASTOLIC BLOOD PRESSURE: 80 MMHG | BODY MASS INDEX: 43.77 KG/M2 | OXYGEN SATURATION: 98 % | HEART RATE: 73 BPM | SYSTOLIC BLOOD PRESSURE: 112 MMHG | WEIGHT: 263 LBS

## 2024-05-15 DIAGNOSIS — R26.89 IMBALANCE: Primary | ICD-10-CM

## 2024-05-15 DIAGNOSIS — R55 SYNCOPE AND COLLAPSE: ICD-10-CM

## 2024-05-15 DIAGNOSIS — R26.89 IMBALANCE: ICD-10-CM

## 2024-05-15 LAB
ALBUMIN SERPL-MCNC: 3.8 G/DL (ref 3.5–5.2)
ALBUMIN/GLOB SERPL: 1 G/DL
ALP SERPL-CCNC: 71 U/L (ref 39–117)
ALT SERPL W P-5'-P-CCNC: 7 U/L (ref 1–33)
ANION GAP SERPL CALCULATED.3IONS-SCNC: 8.8 MMOL/L (ref 5–15)
AST SERPL-CCNC: 7 U/L (ref 1–32)
BASOPHILS # BLD AUTO: 0.02 10*3/MM3 (ref 0–0.2)
BASOPHILS NFR BLD AUTO: 0.3 % (ref 0–1.5)
BILIRUB SERPL-MCNC: 0.4 MG/DL (ref 0–1.2)
BUN SERPL-MCNC: 8 MG/DL (ref 6–20)
BUN/CREAT SERPL: 11.9 (ref 7–25)
CALCIUM SPEC-SCNC: 9 MG/DL (ref 8.6–10.5)
CHLORIDE SERPL-SCNC: 110 MMOL/L (ref 98–107)
CO2 SERPL-SCNC: 24.2 MMOL/L (ref 22–29)
CREAT SERPL-MCNC: 0.67 MG/DL (ref 0.57–1)
DEPRECATED RDW RBC AUTO: 45.6 FL (ref 37–54)
EGFRCR SERPLBLD CKD-EPI 2021: 116.3 ML/MIN/1.73
EOSINOPHIL # BLD AUTO: 0.25 10*3/MM3 (ref 0–0.4)
EOSINOPHIL NFR BLD AUTO: 4.3 % (ref 0.3–6.2)
ERYTHROCYTE [DISTWIDTH] IN BLOOD BY AUTOMATED COUNT: 15.7 % (ref 12.3–15.4)
GLOBULIN UR ELPH-MCNC: 4 GM/DL
GLUCOSE SERPL-MCNC: 77 MG/DL (ref 65–99)
HCT VFR BLD AUTO: 34.6 % (ref 34–46.6)
HGB BLD-MCNC: 11.2 G/DL (ref 12–15.9)
IMM GRANULOCYTES # BLD AUTO: 0.01 10*3/MM3 (ref 0–0.05)
IMM GRANULOCYTES NFR BLD AUTO: 0.2 % (ref 0–0.5)
LYMPHOCYTES # BLD AUTO: 2.01 10*3/MM3 (ref 0.7–3.1)
LYMPHOCYTES NFR BLD AUTO: 34.2 % (ref 19.6–45.3)
MCH RBC QN AUTO: 26.3 PG (ref 26.6–33)
MCHC RBC AUTO-ENTMCNC: 32.4 G/DL (ref 31.5–35.7)
MCV RBC AUTO: 81.2 FL (ref 79–97)
MONOCYTES # BLD AUTO: 0.37 10*3/MM3 (ref 0.1–0.9)
MONOCYTES NFR BLD AUTO: 6.3 % (ref 5–12)
NEUTROPHILS NFR BLD AUTO: 3.21 10*3/MM3 (ref 1.7–7)
NEUTROPHILS NFR BLD AUTO: 54.7 % (ref 42.7–76)
NRBC BLD AUTO-RTO: 0 /100 WBC (ref 0–0.2)
PLATELET # BLD AUTO: 310 10*3/MM3 (ref 140–450)
PMV BLD AUTO: 9.9 FL (ref 6–12)
POTASSIUM SERPL-SCNC: 4 MMOL/L (ref 3.5–5.2)
PROT SERPL-MCNC: 7.8 G/DL (ref 6–8.5)
RBC # BLD AUTO: 4.26 10*6/MM3 (ref 3.77–5.28)
SODIUM SERPL-SCNC: 143 MMOL/L (ref 136–145)
TSH SERPL DL<=0.05 MIU/L-ACNC: 1.18 UIU/ML (ref 0.27–4.2)
WBC NRBC COR # BLD AUTO: 5.87 10*3/MM3 (ref 3.4–10.8)

## 2024-05-15 PROCEDURE — 80050 GENERAL HEALTH PANEL: CPT

## 2024-05-15 PROCEDURE — 36415 COLL VENOUS BLD VENIPUNCTURE: CPT | Performed by: NURSE PRACTITIONER

## 2024-05-15 NOTE — PROGRESS NOTES
DOS: 5/15/2024  NAME: Wanda Solis   : 1990  PCP: Barak Greenberg III, NP-C  Chief Complaint   Patient presents with    Dizziness    Gait Problem      Referring MD: Parveen Greenberg*    Neurological Problem and Interval History:  34 y.o. female presenting for evaluation for symptoms of dizziness and imbalance.  She states this started in 2023.  The symptoms started rather abruptly.  She went to her primary care provider and states that she was told she had an ear infection.  This was treated with antibiotics, however the dizziness and imbalance continued to worsen.  In 2024 her symptoms were very severe.  She felt like she could not walk without falling.  She never actually fell.  She was off work for almost the entire month of March due to the symptoms being debilitating for her.  Around the onset of the symptoms in December, she states she was at work and walking up a flight of stairs.  She became very out of breath and lightheaded and lost consciousness.  She did not injure herself.  She had a CT of the head at the time she states that was negative.  She had a second CT in March of this year that was also negative.  She has not had an MRI of her brain.  She says that she has been evaluated by ENT and workup was unremarkable.  She denies any medication changes or significant stressors.  She denies any vision changes.  Denies tinnitus.    Past Medical/Surgical Hx:  Past Medical History:   Diagnosis Date    Abnormal Pap smear of cervix     follow up wnl     Anemia     Hidradenitis suppurativa     Obesity affecting pregnancy in third trimester 2017    Obesity in pregnancy, antepartum, third trimester 2017    Postpartum care and examination immediately after delivery 12/15/2017    Supervision of normal pregnancy in third trimester 2019    Urogenital trichomoniasis      Past Surgical History:   Procedure Laterality Date    DILATATION AND CURETTAGE       INCISION AND DRAINAGE PERIRECTAL ABSCESS Left 9/1/2018    Procedure: INCISION AND DRAINAGE OF LEFT BREAST;  Surgeon: Parvez Long MD;  Location: [facility];  Service: General    WISDOM TOOTH EXTRACTION         Review of Systems:        Review of Systems     Medications    Current Outpatient Medications:     ferrous sulfate 325 (65 FE) MG tablet, Take 1 tablet by mouth Daily., Disp: 90 tablet, Rfl: 1    vitamin D (ERGOCALCIFEROL) 1.25 MG (62396 UT) capsule capsule, TAKE ONE CAPSULE BY MOUTH BY MOUTH EVERY 14(FOURTEEN) DAYS, Disp: 12 capsule, Rfl: 0    benzoyl peroxide 10 % external wash, USE TO WASH AFFECTED AREAS ONCE DAILY AS DIRECTED (Patient not taking: Reported on 5/15/2024), Disp: , Rfl:     fluticasone (FLONASE) 50 MCG/ACT nasal spray, 1 spray into the nostril(s) as directed by provider 2 (Two) Times a Day. (Patient not taking: Reported on 5/15/2024), Disp: 9.9 mL, Rfl: 0    meclizine (ANTIVERT) 25 MG tablet, Take 1 tablet by mouth 2 (Two) Times a Day As Needed for Dizziness. (Patient not taking: Reported on 5/15/2024), Disp: , Rfl:     traZODone (DESYREL) 50 MG tablet, Take 1 tablet by mouth Every Night. (Patient not taking: Reported on 5/15/2024), Disp: 30 tablet, Rfl: 0     Allergies:  No Known Allergies    Social Hx:  Social History     Socioeconomic History    Marital status: Single   Tobacco Use    Smoking status: Never     Passive exposure: Never    Smokeless tobacco: Never   Vaping Use    Vaping status: Never Used   Substance and Sexual Activity    Alcohol use: No    Drug use: No    Sexual activity: Not Currently     Partners: Male     Birth control/protection: None       Family Hx:  Family History   Problem Relation Age of Onset    No Known Problems Mother     Heart disease Father     Breast cancer Neg Hx     Ovarian cancer Neg Hx     Uterine cancer Neg Hx     Colon cancer Neg Hx     Deep vein thrombosis Neg Hx     Pulmonary embolism Neg Hx        I have reviewed and confirmed the accuracy of  the patient's history: Chief complaint, HPI, ROS, Subjective, and Past Family Social History as entered by the MA/JANIAN/RN. Lisa Clinton MA 05/15/24      Review of Imaging (Interpretation of scans not reports):      Laboratory Results:       Physical Examination:  General Appearance:  Well developed, well nourished, well groomed, alert, and cooperative.  HEENT: Normocephalic.    Neck and Spine: Normal range of motion.  Normal alignment. No mass or tenderness. No bruits.  Cardiac: Regular rate and rhythm. No murmurs.  Peripheral Vasculature: Radial and pedal pulses are equal and symmetric.   Extremities: No edema or deformities. Normal joint ROM.  Skin: No rashes or birth marks.      Neurological examination:   Higher Integrative Function: Oriented to time, place, and person. Normal registration, recall attention span and concentration. Normal language including comprehension, spontaneous speech, repetition, reading, writing, naming and vocabulary. No neglect with normal visual-spatial function and construction. Normal fund of knowledge and higher integrative function.   CN II: Pupils are equal, round and reactive to light. Normal visual acuity and visual fields.   CN III IV VI: Extraocular movements are full without nystagmus.   CN V: Normal facial sensation and strength of muscles of mastication.   CN VII: Facial movements are symmetric. No weakness.   CN VIII: Auditory acuity is normal.  CN IX & X: Symmetric palatal movement.   CN XI: Sternocleidomastoid and trapezius are normal. No weakness.   CN XII: The tongue is midline. No atrophy or fasciculations.  Motor: Normal muscle strength, bulk and tone in upper and lower extremities. No fasciculations, rigidity, spasticity, or abnormal movements.   Reflexes: 2+ in the upper and lower extremities. Plantar responses are flexor.   Sensation: Normal light touch, pinprick, vibration, temperature, and proprioception in the arms and legs.   Station and Gait: Normal gait  and station.   Coordination: Finger to nose test shows no dysmetria. Rapid alternating movements are normal. Heel to shin is normal.            Diagnoses / Discussion:      Plan:       Diagnoses and all orders for this visit:    1. Imbalance (Primary)  -     CBC & Differential; Future  -     Comprehensive Metabolic Panel  -     TSH; Future  -     MRI Brain With & Without Contrast; Future  -     Ambulatory Referral to Physical Therapy    2. Syncope and collapse  -     CBC & Differential; Future  -     Comprehensive Metabolic Panel  -     TSH; Future  -     MRI Brain With & Without Contrast; Future    She was quite debilitated by the dizziness and imbalance, however her symptoms do seem to be improving.  I ordered an MRI brain with and without contrast along with CBC, CMP and TSH.  Referral placed for a vestibular evaluation as well.  She has already been seen by ENT.  If neurologic workup is unremarkable I recommended a referral to cardiology.  We will discuss this once testing results are available.  She will call in the meantime with any worsening or new symptoms.    I spent 40 minutes caring for patient on todays date of service. This time includes time spent by me in the following activities: obtaining and/or reviewing a separately obtained history, performing a medically appropriate examination and/or evaluation, counseling and educating the patient on diagnosis and treatment, ordering medications, tests, or procedures, referring and communicating with other health care professionals and documenting information in the medical record.         Coding

## 2024-05-16 ENCOUNTER — TELEPHONE (OUTPATIENT)
Dept: NEUROLOGY | Facility: CLINIC | Age: 34
End: 2024-05-16
Payer: COMMERCIAL

## 2024-05-16 NOTE — TELEPHONE ENCOUNTER
Caller: Wanda Solis    Relationship: Self    Best call back number: [phone]    Caller requesting test results: PATIENT    What test was performed: LABS    When was the test performed: 5/15/24    Where was the test performed:     Additional notes: PATIENT IS REQUESTING LAB WORK TEST RESULTS COMPLETED YESTERDAY 5/15/24 @     PLEASE CALL-THANK YOU

## 2024-05-17 ENCOUNTER — PATIENT ROUNDING (BHMG ONLY) (OUTPATIENT)
Dept: NEUROLOGY | Facility: CLINIC | Age: 34
End: 2024-05-17
Payer: COMMERCIAL

## 2024-06-12 ENCOUNTER — TELEPHONE (OUTPATIENT)
Dept: NEUROLOGY | Facility: CLINIC | Age: 34
End: 2024-06-12
Payer: COMMERCIAL

## 2024-06-12 NOTE — TELEPHONE ENCOUNTER
Had Left , MyChart (if applicable) & mailed reminder regarding appt reschedule due to provider being out of office.  Had scheduled pt for July 9th at 10:30 AM

## 2024-06-18 ENCOUNTER — OFFICE VISIT (OUTPATIENT)
Dept: OBSTETRICS AND GYNECOLOGY | Facility: CLINIC | Age: 34
End: 2024-06-18
Payer: COMMERCIAL

## 2024-06-18 VITALS
HEIGHT: 65 IN | HEART RATE: 77 BPM | BODY MASS INDEX: 43.49 KG/M2 | WEIGHT: 261 LBS | SYSTOLIC BLOOD PRESSURE: 126 MMHG | DIASTOLIC BLOOD PRESSURE: 84 MMHG

## 2024-06-18 DIAGNOSIS — L73.2 HIDRADENITIS: ICD-10-CM

## 2024-06-18 DIAGNOSIS — Z01.419 ENCOUNTER FOR GYNECOLOGICAL EXAMINATION WITHOUT ABNORMAL FINDING: Primary | ICD-10-CM

## 2024-06-18 PROCEDURE — 99459 PELVIC EXAMINATION: CPT | Performed by: OBSTETRICS & GYNECOLOGY

## 2024-06-18 PROCEDURE — 99395 PREV VISIT EST AGE 18-39: CPT | Performed by: OBSTETRICS & GYNECOLOGY

## 2024-06-18 RX ORDER — DOXYCYCLINE 100 MG/1
100 CAPSULE ORAL 2 TIMES DAILY
Qty: 20 CAPSULE | Refills: 0 | Status: SHIPPED | OUTPATIENT
Start: 2024-06-18 | End: 2024-06-28

## 2024-06-18 NOTE — PROGRESS NOTES
GYN Annual Exam     CC- Here for annual exam.     Wanda Solis is a 34 y.o. female who presents for annual well woman exam. Periods are regular every 28-30 days, lasting 5 days. Dysmenorrhea:mild, occurring first 1-2 days of flow. Cyclic symptoms include none. No intermenstrual bleeding, spotting, or discharge.    OB History          5    Para   3    Term   3            AB   2    Living   3         SAB   1    IAB        Ectopic        Molar        Multiple   0    Live Births   3                Current contraception: none  History of abnormal Pap smear: yes - no treatment required   Family history of uterine, colon or ovarian cancer: yes - colon in father  History of abnormal mammogram: no  Family history of breast cancer: no  Last Pap : 10/12/2021 NIL HPV neg      Past Medical History:   Diagnosis Date    Abnormal Pap smear of cervix     follow up wnl     Anemia     Hidradenitis suppurativa     Obesity affecting pregnancy in third trimester 2017    Obesity in pregnancy, antepartum, third trimester 2017    Postpartum care and examination immediately after delivery 12/15/2017    Supervision of normal pregnancy in third trimester 2019    Urogenital trichomoniasis        Past Surgical History:   Procedure Laterality Date    DILATATION AND CURETTAGE      INCISION AND DRAINAGE PERIRECTAL ABSCESS Left 2018    Procedure: INCISION AND DRAINAGE OF LEFT BREAST;  Surgeon: Parvez Long MD;  Location[facility];  Service: General    WISDOM TOOTH EXTRACTION           Current Outpatient Medications:     doxycycline (MONODOX) 100 MG capsule, Take 1 capsule by mouth 2 (Two) Times a Day for 10 days., Disp: 20 capsule, Rfl: 0    ferrous sulfate 325 (65 FE) MG tablet, Take 1 tablet by mouth Daily., Disp: 90 tablet, Rfl: 1    vitamin D (ERGOCALCIFEROL) 1.25 MG (04857 UT) capsule capsule, TAKE ONE CAPSULE BY MOUTH BY MOUTH EVERY 14(FOURTEEN) DAYS, Disp: 12 capsule, Rfl: 0    No Known  "Allergies    Social History     Tobacco Use    Smoking status: Never     Passive exposure: Never    Smokeless tobacco: Never   Vaping Use    Vaping status: Never Used   Substance Use Topics    Alcohol use: No    Drug use: No       Family History   Problem Relation Age of Onset    Colon cancer Father     Heart disease Father     No Known Problems Mother     Breast cancer Neg Hx     Ovarian cancer Neg Hx     Uterine cancer Neg Hx     Deep vein thrombosis Neg Hx     Pulmonary embolism Neg Hx        Review of Systems   Constitutional:  Negative for chills and fever.   Gastrointestinal:  Negative for abdominal pain, constipation and diarrhea.   Genitourinary:  Negative for menstrual problem, pelvic pain, vaginal bleeding and vaginal discharge.   All other systems reviewed and are negative.      /84   Pulse 77   Ht 165.1 cm (65\")   Wt 118 kg (261 lb)   LMP  (Approximate) Comment: 3 weeks ago  BMI 43.43 kg/m²     Physical Exam  Constitutional:       General: She is not in acute distress.     Appearance: She is well-developed. She is obese.   Genitourinary:      Vulva normal.      Right Labia: skin changes.      Right Labia: No lesions or Bartholin's cyst.     Left Labia: skin changes (Multiple HS ).      Left Labia: No lesions or Bartholin's cyst.     No inguinal adenopathy present in the right or left side.     No vaginal discharge or bleeding.        Right Adnexa: not tender, not full and no mass present.     Left Adnexa: not tender, not full and no mass present.     No cervical motion tenderness or friability.      Uterus is fixed (Difficult exam, habitus - possibley scarred ).      Uterus is not enlarged (limted by habitus) or tender.      No uterine mass detected.     Uterus is anteverted.   Breasts:     Right: No inverted nipple, mass or nipple discharge.      Left: No inverted nipple, mass or nipple discharge.   HENT:      Head: Normocephalic and atraumatic.      Nose: Nose normal.   Eyes:      " Conjunctiva/sclera: Conjunctivae normal.      Pupils: Pupils are equal, round, and reactive to light.   Neck:      Thyroid: No thyromegaly.   Cardiovascular:      Rate and Rhythm: Normal rate and regular rhythm.      Heart sounds: Normal heart sounds. No murmur heard.  Pulmonary:      Effort: Pulmonary effort is normal. No respiratory distress.      Breath sounds: Normal breath sounds.   Abdominal:      General: Abdomen is flat. There is no distension.      Palpations: Abdomen is soft.      Tenderness: There is no abdominal tenderness.   Musculoskeletal:         General: No deformity. Normal range of motion.      Cervical back: Normal range of motion and neck supple.      Right lower leg: No edema.      Left lower leg: No edema.   Lymphadenopathy:      Lower Body: No right inguinal and no right inguinal adenopathy. No left inguinal and no left inguinal adenopathy.   Neurological:      Mental Status: She is alert and oriented to person, place, and time.   Skin:     General: Skin is warm and dry.      Findings: Lesion (Multiple HS around breast, vulvar ) present. No erythema.   Psychiatric:         Behavior: Behavior normal.         Thought Content: Thought content normal.         Judgment: Judgment normal.   Vitals reviewed. Exam conducted with a chaperone present.               Assessment     Diagnoses and all orders for this visit:    1. Encounter for gynecological examination without abnormal finding (Primary)  -     IGP, Apt HPV,rfx 16 / 18,45    2. Hidradenitis    Other orders  -     doxycycline (MONODOX) 100 MG capsule; Take 1 capsule by mouth 2 (Two) Times a Day for 10 days.  Dispense: 20 capsule; Refill: 0         Plan     1) Breast Health - Clinical breast exam yearly, Discussed American cancer society recommendations for breast cancer screening, and Self breast awareness monthly  CBE normal  2) Pap - updated today, expectations reviewed.   3) Smoking status- non-smoker   4) Encouraged between 7-8 hours of  good sleep per night.   5) Follow up prn and one year.       Amor Michele MD   6/18/2024  15:34 EDT

## 2024-06-20 ENCOUNTER — TELEPHONE (OUTPATIENT)
Dept: PHYSICAL THERAPY | Facility: HOSPITAL | Age: 34
End: 2024-06-20
Payer: COMMERCIAL

## 2024-06-20 LAB
CYTOLOGIST CVX/VAG CYTO: NORMAL
CYTOLOGY CVX/VAG DOC CYTO: NORMAL
CYTOLOGY CVX/VAG DOC THIN PREP: NORMAL
DX ICD CODE: NORMAL
HPV I/H RISK 4 DNA CVX QL PROBE+SIG AMP: NEGATIVE
Lab: NORMAL
OTHER STN SPEC: NORMAL
STAT OF ADQ CVX/VAG CYTO-IMP: NORMAL

## 2024-06-20 NOTE — TELEPHONE ENCOUNTER
Pt has had two no shows, no calls for PT evaluation. Attempted to call pt over past week but no reply. Pt will be removed from PT schedule at this time.

## 2024-07-20 ENCOUNTER — HOSPITAL ENCOUNTER (OUTPATIENT)
Dept: MRI IMAGING | Facility: HOSPITAL | Age: 34
Discharge: HOME | End: 2024-07-20
Admitting: NURSE PRACTITIONER
Payer: COMMERCIAL

## 2024-07-20 DIAGNOSIS — R55 SYNCOPE AND COLLAPSE: ICD-10-CM

## 2024-07-20 DIAGNOSIS — R26.89 IMBALANCE: ICD-10-CM

## 2024-07-20 PROCEDURE — 0 GADOBENATE DIMEGLUMINE 529 MG/ML SOLUTION: Performed by: NURSE PRACTITIONER

## 2024-07-20 PROCEDURE — A9577 INJ MULTIHANCE: HCPCS | Performed by: NURSE PRACTITIONER

## 2024-07-20 PROCEDURE — 70553 MRI BRAIN STEM W/O & W/DYE: CPT

## 2024-07-20 RX ADMIN — GADOBENATE DIMEGLUMINE 20 ML: 529 INJECTION, SOLUTION INTRAVENOUS at 14:28

## 2024-07-23 ENCOUNTER — TELEPHONE (OUTPATIENT)
Dept: NEUROLOGY | Facility: CLINIC | Age: 34
End: 2024-07-23
Payer: COMMERCIAL

## 2024-07-23 NOTE — TELEPHONE ENCOUNTER
Caller: ANA    Relationship: SELF    Best call back number: [phone]    What is the best time to reach you: AFTER 2:30PM    Who are you requesting to speak with (clinical staff, provider,  specific staff member): [facility]    Do you know the name of the person who called: [facility]    What was the call regarding: PATIENT RETURNING VM FROM [facility] REGARDING RESULTS.     UNABLE TO TRANSFER    PLEASE REVIEW  THANK YOU     Is it okay if the provider responds through [employer]: YES

## 2024-08-08 NOTE — PROGRESS NOTES
Wanda normal pap for the mail. Thanks,  Dr. Michele Attending Attestation (For Attendings USE Only)...

## 2024-08-19 ENCOUNTER — OFFICE VISIT (OUTPATIENT)
Dept: INTERNAL MEDICINE | Facility: CLINIC | Age: 34
End: 2024-08-19
Payer: COMMERCIAL

## 2024-08-19 VITALS
SYSTOLIC BLOOD PRESSURE: 130 MMHG | TEMPERATURE: 98.7 F | HEIGHT: 65 IN | WEIGHT: 258.6 LBS | HEART RATE: 99 BPM | OXYGEN SATURATION: 97 % | BODY MASS INDEX: 43.09 KG/M2 | DIASTOLIC BLOOD PRESSURE: 82 MMHG

## 2024-08-19 DIAGNOSIS — R05.1 ACUTE COUGH: ICD-10-CM

## 2024-08-19 DIAGNOSIS — R51.9 ACUTE INTRACTABLE HEADACHE, UNSPECIFIED HEADACHE TYPE: ICD-10-CM

## 2024-08-19 DIAGNOSIS — R68.83 CHILLS: Primary | ICD-10-CM

## 2024-08-19 DIAGNOSIS — U07.1 COVID: ICD-10-CM

## 2024-08-19 LAB
EXPIRATION DATE: ABNORMAL
FLUAV AG UPPER RESP QL IA.RAPID: NOT DETECTED
FLUBV AG UPPER RESP QL IA.RAPID: NOT DETECTED
INTERNAL CONTROL: ABNORMAL
Lab: ABNORMAL
SARS-COV-2 AG UPPER RESP QL IA.RAPID: DETECTED

## 2024-08-19 PROCEDURE — 99213 OFFICE O/P EST LOW 20 MIN: CPT | Performed by: NURSE PRACTITIONER

## 2024-08-19 PROCEDURE — 87428 SARSCOV & INF VIR A&B AG IA: CPT | Performed by: NURSE PRACTITIONER

## 2024-08-19 RX ORDER — AMOXICILLIN AND CLAVULANATE POTASSIUM 500; 125 MG/1; MG/1
TABLET, FILM COATED ORAL
COMMUNITY
Start: 2024-08-13

## 2024-08-19 RX ORDER — BENZONATATE 100 MG/1
100 CAPSULE ORAL 3 TIMES DAILY PRN
Qty: 30 CAPSULE | Refills: 0 | Status: SHIPPED | OUTPATIENT
Start: 2024-08-19

## 2024-08-19 NOTE — LETTER
August 19, 2024     Patient: Wanda Solis   YOB: 1990   Date of Visit: 8/19/2024       To Whom It May Concern:    It is my medical opinion that Wanda Solis may return to work when she is 24 hours fever free. She was seen in my office today.          Sincerely,        Barak Greenberg III, NP-C    CC: No Recipients

## 2024-08-19 NOTE — PATIENT INSTRUCTIONS
Symptom Treatment:     Fever/ Chills / Headache / Body Aches:    Tylenol is recommended: if you are not getting any improvement you can alternate Tylenol and Ibuprofen (Ibuprofen should always be taken with food)      You may take over-the-counter Tylenol Cold/Flu Remedy (if you do - do not take additional tylenol as this will have tylenol included)     We advise that patients stay well hydrated, particularly those patients with sustained or higher fevers, in whom insensible fluid losses may be significant.    ?Cough that is persistent, interferes with sleep, or causes discomfort can be managed with an over-the-counter cough medication.     Additionally take if checked and prescribed:   [x] Benzonatate 100 mg can be taken orally three times a day as needed.  Do not take with a hot beverage.       Warm salt water gargle for sore throat.       Breathing exercises  Pursed lip breathing exercises:   Sitting upright or slightly reclining, relax your neck and shoulder muscles.   With your mouth closed, inhale through the nose for 2 seconds, as if smelling a flower.   Exhale slowly (for 4 seconds if possible) through pursed lips, as if blowing out birthday candles.               Repeat inhalation and exhalation cycles for 2 minutes, several times a day and              when needed.   Deep breathing exercises:   Recline in bed or on a sofa with a pillow under your head and knees. If reclining is not possible, this may be done while sitting upright.   Place one hand on your belly, the other hand on your chest.   Slowly inhale through your nose; let your lungs fill with air, allowing your belly to rise. (The hand on the belly should move more than the hand on the chest.)   Breathe out through your nose, and as you exhale, feel your belly lower.   Repeat the inhalation and exhalation cycles for 2 to 5 minutes several times a day and when needed.       Worsening:      If you have new onset of shortness of breath, worsening  "shortness of breath, dizziness, and mental status changes such as confusion.   GO TO THE EMERGENCY ROOM OR CALL 911.       Isolation:     People can return to work or regular activities if their symptoms are mild and improving and it's been a day since they've had a fever (without taking fever reducing medication), but the CDC still recommends those with symptoms stay home.    Recommendations suggest returning to normal activities when, for at least 24 hours, symptoms are improving overall, and if a fever was present, it has been gone without use of a fever-reducing medication.    Once activities are resumed, the CDC still recommends \"additional prevention strategies\" for an additional five days, including wearing a mask and keeping distance from others.    Practicing good hygiene by covering coughs and sneezes, washing or sanitizing hands often, and cleaning frequently touched surfaces.  Taking steps for  air, such as bringing in more fresh outside air, purifying indoor air, or gathering outdoors.     Next vaccine:   CDC recommends waiting at least 2 months before getting next COVID booster after testing positive.       [place] Coronavirus Hotline: 1.137.641.8597   "

## 2024-08-19 NOTE — PROGRESS NOTES
Chief Complaint  Chills (Started Friday ), Shortness of Breath, Headache, and Sore Throat     Subjective:      History of Present Illness {CC  Problem List  Visit  Diagnosis   Encounters  Notes  Medications  Labs  Result Review Imaging  Media :23}     Wanda Solis presents to [facility] for:      Covid/ flu like symptoms:     Onset: Friday     Home COVID test:   [] Yes:  [] Negative   [] Positive   [] No    Most common symptoms include:  [] Fever (didn't check - none today)   [x] Dry cough  [x] Tiredness / fatigue   +Chills     Less common symptoms:  [] Body aches and pains  [x] Sore throat  [] Diarrhea  [] Conjunctivitis  [x] Headache  [] Loss of taste or smell  [] a rash on skin, or discoloration of fingers or toes    Serious symptoms: [] Denies   [] Difficulty breathing or shortness of breath  [] Chest pain or pressure  [] Loss of speech of movement    Symptom onset:   [] Gradual   [] Sudden     Symptom progression:   [] Improving  [] Worsening   [] Unchanged     Known COVID exposure:  [] Yes  [] No  [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     I have reviewed patient's medical history, any new submitted information provided by patient or medical assistant and updated medical record.      Objective:      Physical Exam  Constitutional:       General: She is not in acute distress.  Cardiovascular:      Rate and Rhythm: Normal rate and regular rhythm.      Pulses: Normal pulses.      Heart sounds: Normal heart sounds.   Pulmonary:      Effort: Pulmonary effort is normal.      Breath sounds: Normal breath sounds. No wheezing.   Lymphadenopathy:      Cervical: No cervical adenopathy.   Neurological:      Mental Status: She is oriented to person, place, and time.        Result Review  Data Reviewed:{ Labs  Result Review  Imaging  Med Tab  Media :23}  "                     Component  Ref Range & Units 10:47 AM   SARS Antigen  Not Detected, Presumptive Negative Detected Abnormal    Influenza A Antigen MELISSA  Not Detected Not Detected   Influenza B Antigen MELISSA  Not Detected Not Detected   Internal Control  Passed Passed   Lot Number [id number]   Expiration Date 02/05/2025   Resulting Agency [facility]          Vital Signs:   /82 (BP Location: Left arm, Patient Position: Sitting, Cuff Size: Adult)   Pulse 99   Temp 98.7 °F (37.1 °C) (Oral)   Ht 165.1 cm (65\")   Wt 117 kg (258 lb 9.6 oz)   SpO2 97%   BMI 43.03 kg/m²   Estimated body mass index is 43.03 kg/m² as calculated from the following:    Height as of this encounter: 165.1 cm (65\").    Weight as of this encounter: 117 kg (258 lb 9.6 oz).        Requested Prescriptions     Signed Prescriptions Disp Refills    benzonatate (Tessalon Perles) 100 MG capsule 30 capsule 0     Sig: Take 1 capsule by mouth 3 (Three) Times a Day As Needed for Cough.       Routine medications provided by this office will also be refilled via pharmacy request.       Current Outpatient Medications:     amoxicillin-clavulanate (AUGMENTIN) 500-125 MG per tablet, TAKE 1 TABLET BY MOUTH EVERY 8 HOURS UNTIL ALL TAKEN, Disp: , Rfl:     ferrous sulfate 325 (65 FE) MG tablet, Take 1 tablet by mouth Daily., Disp: 90 tablet, Rfl: 1    vitamin D (ERGOCALCIFEROL) 1.25 MG (12592 UT) capsule capsule, TAKE ONE CAPSULE BY MOUTH BY MOUTH EVERY 14(FOURTEEN) DAYS, Disp: 12 capsule, Rfl: 0    benzonatate (Tessalon Perles) 100 MG capsule, Take 1 capsule by mouth 3 (Three) Times a Day As Needed for Cough., Disp: 30 capsule, Rfl: 0     Assessment and Plan:      Assessment and Plan {CC Problem List  Visit Diagnosis  ROS  Review (Popup)  Health Maintenance  Quality  BestPractice  Medications  SmartSets  SnapShot Encounters  Media :23}     Diagnoses and all orders for this visit:    1. Chills (Primary)  -     Cancel: POCT SARS-CoV-2 Antigen " MELISSA  -     POCT SARS-CoV-2 Antigen MELISSA + Flu    2. Acute intractable headache, unspecified headache type    3. COVID    4. Acute cough  -     benzonatate (Tessalon Perles) 100 MG capsule; Take 1 capsule by mouth 3 (Three) Times a Day As Needed for Cough.  Dispense: 30 capsule; Refill: 0             New Medications Ordered This Visit   Medications    benzonatate (Tessalon Perles) 100 MG capsule     Sig: Take 1 capsule by mouth 3 (Three) Times a Day As Needed for Cough.     Dispense:  30 capsule     Refill:  0       Work note provided.   AVS printed and given to patient.       Follow Up {Instructions Charge Capture  Follow-up Communications :23}     Return if symptoms worsen or fail to improve.      Patient was given instructions and counseling regarding her condition or for health maintenance advice. Please see specific information pulled into the AVS if appropriate.    Dragon disclaimer:   Much of this encounter note is an electronic transcription/translation of spoken language to printed text. The electronic translation of spoken language may permit erroneous, or at times, nonsensical words or phrases to be inadvertently transcribed; Although I have reviewed the note for such errors, some may still exist.     Additional Patient Counseling:       Patient Instructions     Symptom Treatment:     Fever/ Chills / Headache / Body Aches:    Tylenol is recommended: if you are not getting any improvement you can alternate Tylenol and Ibuprofen (Ibuprofen should always be taken with food)      You may take over-the-counter Tylenol Cold/Flu Remedy (if you do - do not take additional tylenol as this will have tylenol included)     We advise that patients stay well hydrated, particularly those patients with sustained or higher fevers, in whom insensible fluid losses may be significant.    ?Cough that is persistent, interferes with sleep, or causes discomfort can be managed with an over-the-counter cough medication.      Additionally take if checked and prescribed:   [x] Benzonatate 100 mg can be taken orally three times a day as needed.  Do not take with a hot beverage.       Warm salt water gargle for sore throat.       Breathing exercises  Pursed lip breathing exercises:   Sitting upright or slightly reclining, relax your neck and shoulder muscles.   With your mouth closed, inhale through the nose for 2 seconds, as if smelling a flower.   Exhale slowly (for 4 seconds if possible) through pursed lips, as if blowing out birthday candles.               Repeat inhalation and exhalation cycles for 2 minutes, several times a day and              when needed.   Deep breathing exercises:   Recline in bed or on a sofa with a pillow under your head and knees. If reclining is not possible, this may be done while sitting upright.   Place one hand on your belly, the other hand on your chest.   Slowly inhale through your nose; let your lungs fill with air, allowing your belly to rise. (The hand on the belly should move more than the hand on the chest.)   Breathe out through your nose, and as you exhale, feel your belly lower.   Repeat the inhalation and exhalation cycles for 2 to 5 minutes several times a day and when needed.       Worsening:      If you have new onset of shortness of breath, worsening shortness of breath, dizziness, and mental status changes such as confusion.   GO TO THE EMERGENCY ROOM OR CALL 911.       Isolation:     People can return to work or regular activities if their symptoms are mild and improving and it's been a day since they've had a fever (without taking fever reducing medication), but the CDC still recommends those with symptoms stay home.    Recommendations suggest returning to normal activities when, for at least 24 hours, symptoms are improving overall, and if a fever was present, it has been gone without use of a fever-reducing medication.    Once activities are resumed, the CDC still recommends  "\"additional prevention strategies\" for an additional five days, including wearing a mask and keeping distance from others.    Practicing good hygiene by covering coughs and sneezes, washing or sanitizing hands often, and cleaning frequently touched surfaces.  Taking steps for  air, such as bringing in more fresh outside air, purifying indoor air, or gathering outdoors.     Next vaccine:   CDC recommends waiting at least 2 months before getting next COVID booster after testing positive.       [place] Coronavirus Hotline: 1.522.264.1577   "

## 2024-10-02 ENCOUNTER — OFFICE VISIT (OUTPATIENT)
Dept: INTERNAL MEDICINE | Facility: CLINIC | Age: 34
End: 2024-10-02
Payer: COMMERCIAL

## 2024-10-02 VITALS
DIASTOLIC BLOOD PRESSURE: 80 MMHG | WEIGHT: 257.8 LBS | OXYGEN SATURATION: 100 % | HEIGHT: 65 IN | BODY MASS INDEX: 42.95 KG/M2 | HEART RATE: 68 BPM | SYSTOLIC BLOOD PRESSURE: 120 MMHG

## 2024-10-02 DIAGNOSIS — R29.818 SUSPECTED SLEEP APNEA: ICD-10-CM

## 2024-10-02 DIAGNOSIS — M25.552 LEFT HIP PAIN: Primary | ICD-10-CM

## 2024-10-02 PROCEDURE — 99213 OFFICE O/P EST LOW 20 MIN: CPT | Performed by: NURSE PRACTITIONER

## 2024-10-02 RX ORDER — MELOXICAM 7.5 MG/1
7.5 TABLET ORAL DAILY
Qty: 7 TABLET | Refills: 0 | Status: SHIPPED | OUTPATIENT
Start: 2024-10-02

## 2024-10-02 NOTE — PROGRESS NOTES
"        Chief Complaint  Flank Pain (Left side )     Subjective:      History of Present Illness {CC  Problem List  Visit  Diagnosis   Encounters  Notes  Medications  Labs  Result Review Imaging  Media :23}     Wanda Solis presents to [facility] for:      Pain over left groin: no known injury.   Saturday to Monday: took APAP and has been improving.   Worked Sunday  Had not done any different activity. No hip pain.    No fever/ chills or urinary symptoms.  No n/v/d.   States today basically gone.     States when she sleeps- at times will gasp. Doesn't know if she snores. +daytime fatigue   RF: MO, Father: CELIA      I have reviewed patient's medical history, any new submitted information provided by patient or medical assistant and updated medical record.      Objective:      Physical Exam  HENT:      Mouth/Throat:      Comments: Mallampati 3-4  Cardiovascular:      Rate and Rhythm: Normal rate.      Pulses: Normal pulses.   Pulmonary:      Effort: Pulmonary effort is normal.   Abdominal:      General: There is no distension.      Tenderness: There is no abdominal tenderness.   Musculoskeletal:        Legs:    Neurological:      Gait: Gait normal.        Result Review  Data Reviewed:{ Labs  Result Review  Imaging  Med Tab  Media :23}                Vital Signs:   /80 (BP Location: Left arm, Patient Position: Sitting, Cuff Size: Adult)   Pulse 68   Ht 165.1 cm (65\")   Wt 117 kg (257 lb 12.8 oz)   SpO2 100%   BMI 42.90 kg/m²   Estimated body mass index is 42.9 kg/m² as calculated from the following:    Height as of this encounter: 165.1 cm (65\").    Weight as of this encounter: 117 kg (257 lb 12.8 oz).        Requested Prescriptions     Signed Prescriptions Disp Refills    meloxicam (Mobic) 7.5 MG tablet 7 tablet 0     Sig: Take 1 tablet by mouth Daily. Take with food.       Routine medications provided by this office will also be refilled via pharmacy " request.       Current Outpatient Medications:     ferrous sulfate 325 (65 FE) MG tablet, Take 1 tablet by mouth Daily., Disp: 90 tablet, Rfl: 1    vitamin D (ERGOCALCIFEROL) 1.25 MG (25787 UT) capsule capsule, TAKE ONE CAPSULE BY MOUTH BY MOUTH EVERY 14(FOURTEEN) DAYS, Disp: 12 capsule, Rfl: 0    meloxicam (Mobic) 7.5 MG tablet, Take 1 tablet by mouth Daily. Take with food., Disp: 7 tablet, Rfl: 0     Assessment and Plan:      Assessment and Plan {CC Problem List  Visit Diagnosis  ROS  Review (Popup)  Health Maintenance  Quality  BestPractice  Medications  SmartSets  SnapShot Encounters  Media :23}     Diagnoses and all orders for this visit:    1. Left hip pain (Primary)  -     meloxicam (Mobic) 7.5 MG tablet; Take 1 tablet by mouth Daily. Take with food.  Dispense: 7 tablet; Refill: 0    2. Suspected sleep apnea  -     Ambulatory Referral to Sleep Medicine             New Medications Ordered This Visit   Medications    meloxicam (Mobic) 7.5 MG tablet     Sig: Take 1 tablet by mouth Daily. Take with food.     Dispense:  7 tablet     Refill:  0     Take with food.       Follow Up {Instructions Charge Capture  Follow-up Communications :23}     Return if symptoms worsen or fail to improve.      Patient was given instructions and counseling regarding her condition or for health maintenance advice. Please see specific information pulled into the AVS if appropriate.    Dragon disclaimer:   Much of this encounter note is an electronic transcription/translation of spoken language to printed text. The electronic translation of spoken language may permit erroneous, or at times, nonsensical words or phrases to be inadvertently transcribed; Although I have reviewed the note for such errors, some may still exist.     Additional Patient Counseling:       There are no Patient Instructions on file for this visit.

## 2024-11-05 ENCOUNTER — OFFICE VISIT (OUTPATIENT)
Dept: INTERNAL MEDICINE | Facility: CLINIC | Age: 34
End: 2024-11-05
Payer: COMMERCIAL

## 2024-11-05 VITALS
DIASTOLIC BLOOD PRESSURE: 82 MMHG | HEIGHT: 65 IN | BODY MASS INDEX: 42.92 KG/M2 | SYSTOLIC BLOOD PRESSURE: 112 MMHG | HEART RATE: 102 BPM | OXYGEN SATURATION: 99 % | WEIGHT: 257.6 LBS

## 2024-11-05 DIAGNOSIS — H69.93 EUSTACHIAN TUBE DYSFUNCTION, BILATERAL: Primary | ICD-10-CM

## 2024-11-05 PROCEDURE — 99213 OFFICE O/P EST LOW 20 MIN: CPT | Performed by: NURSE PRACTITIONER

## 2024-11-05 RX ORDER — FLUTICASONE PROPIONATE 50 MCG
1 SPRAY, SUSPENSION (ML) NASAL 2 TIMES DAILY
Qty: 9.9 ML | Refills: 0 | Status: SHIPPED | OUTPATIENT
Start: 2024-11-05

## 2024-11-05 NOTE — PROGRESS NOTES
"        Chief Complaint  Earache (Feels pressure in both ears since last week)     Subjective:      History of Present Illness {CC  Problem List  Visit  Diagnosis   Encounters  Notes  Medications  Labs  Result Review Imaging  Media :23}     Wanda Solis presents to [facility] for:      Ear Fullness   There is pain in both (L >R) ears. The current episode started 1 to 4 weeks ago. The problem occurs intermittently. The problem has been comes and goes. There has been no fever. Associated symptoms include a sore throat (but improved after she took her son's amoxicillin). Pertinent negatives include no coughing or rhinorrhea. There is no history of hearing loss.     Denies dizziness, GERD.        I have reviewed patient's medical history, any new submitted information provided by patient or medical assistant and updated medical record.      Objective:      Physical Exam  HENT:      Right Ear: Tympanic membrane normal.      Left Ear: Tympanic membrane normal.      Mouth/Throat:      Pharynx: No posterior oropharyngeal erythema.   Eyes:      Conjunctiva/sclera: Conjunctivae normal.   Cardiovascular:      Rate and Rhythm: Normal rate.      Pulses: Normal pulses.   Pulmonary:      Breath sounds: Normal breath sounds.   Lymphadenopathy:      Cervical: No cervical adenopathy.        Result Review  Data Reviewed:{ Labs  Result Review  Imaging  Med Tab  Media :23}                Vital Signs:   /82 (BP Location: Left arm, Patient Position: Sitting, Cuff Size: Adult)   Pulse 102   Ht 165.1 cm (65\")   Wt 117 kg (257 lb 9.6 oz)   SpO2 99%   BMI 42.87 kg/m²   Estimated body mass index is 42.87 kg/m² as calculated from the following:    Height as of this encounter: 165.1 cm (65\").    Weight as of this encounter: 117 kg (257 lb 9.6 oz).        Requested Prescriptions     Signed Prescriptions Disp Refills    fluticasone (FLONASE) 50 MCG/ACT nasal spray 9.9 mL 0     Sig: " Administer 1 spray into the nostril(s) as directed by provider 2 (Two) Times a Day.       Routine medications provided by this office will also be refilled via pharmacy request.       Current Outpatient Medications:     ferrous sulfate 325 (65 FE) MG tablet, Take 1 tablet by mouth Daily., Disp: 90 tablet, Rfl: 1    vitamin D (ERGOCALCIFEROL) 1.25 MG (91618 UT) capsule capsule, TAKE ONE CAPSULE BY MOUTH BY MOUTH EVERY 14(FOURTEEN) DAYS, Disp: 12 capsule, Rfl: 0    fluticasone (FLONASE) 50 MCG/ACT nasal spray, Administer 1 spray into the nostril(s) as directed by provider 2 (Two) Times a Day., Disp: 9.9 mL, Rfl: 0    meloxicam (Mobic) 7.5 MG tablet, Take 1 tablet by mouth Daily. Take with food. (Patient not taking: Reported on 11/5/2024), Disp: 7 tablet, Rfl: 0     Assessment and Plan:      Assessment and Plan {CC Problem List  Visit Diagnosis  ROS  Review (Popup)  Health Maintenance  Quality  BestPractice  Medications  SmartSets  SnapShot Encounters  Media :23}     Diagnoses and all orders for this visit:    1. Eustachian tube dysfunction, bilateral (Primary)  -     fluticasone (FLONASE) 50 MCG/ACT nasal spray; Administer 1 spray into the nostril(s) as directed by provider 2 (Two) Times a Day.  Dispense: 9.9 mL; Refill: 0             New Medications Ordered This Visit   Medications    fluticasone (FLONASE) 50 MCG/ACT nasal spray     Sig: Administer 1 spray into the nostril(s) as directed by provider 2 (Two) Times a Day.     Dispense:  9.9 mL     Refill:  0     I discussed medication use, dosing and possible side effects.   Patient was given opportunity to ask any questions.    Educated on how to administer.   Take for at least 14 days.       Follow Up {Instructions Charge Capture  Follow-up Communications :23}     Return if symptoms worsen or fail to improve.      Patient was given instructions and counseling regarding her condition or for health maintenance advice. Please see specific information  pulled into the AVS if appropriate.    [place] disclaimer:   Much of this encounter note is an electronic transcription/translation of spoken language to printed text. The electronic translation of spoken language may permit erroneous, or at times, nonsensical words or phrases to be inadvertently transcribed; Although I have reviewed the note for such errors, some may still exist.     Additional Patient Counseling:       There are no Patient Instructions on file for this visit.

## 2024-12-18 ENCOUNTER — OFFICE VISIT (OUTPATIENT)
Dept: SLEEP MEDICINE | Facility: HOSPITAL | Age: 34
End: 2024-12-18
Payer: COMMERCIAL

## 2024-12-18 VITALS — HEART RATE: 78 BPM | BODY MASS INDEX: 42.99 KG/M2 | WEIGHT: 258 LBS | HEIGHT: 65 IN | OXYGEN SATURATION: 98 %

## 2024-12-18 DIAGNOSIS — G47.8 NON-RESTORATIVE SLEEP: ICD-10-CM

## 2024-12-18 DIAGNOSIS — G47.9 SLEEP DISTURBANCE: ICD-10-CM

## 2024-12-18 DIAGNOSIS — E66.01 SEVERE OBESITY (BMI >= 40): ICD-10-CM

## 2024-12-18 DIAGNOSIS — R29.818 SUSPECTED SLEEP APNEA: Primary | ICD-10-CM

## 2024-12-18 PROCEDURE — G0463 HOSPITAL OUTPT CLINIC VISIT: HCPCS

## 2024-12-18 NOTE — PROGRESS NOTES
[facility]  [address]  [place] [address]  Phone   Fax       Wanda Solis  [id number]   1990  34 y.o.  female      Referring Provider and PCP: Barak Greenberg III, NP-C    Type of service: Initial Sleep Medicine Consult  Date of service: 12/18/2024          CHIEF COMPLAINT: Suspected sleep apnea      HISTORY OF PRESENT ILLNESS:  Wanda Solis 34 y.o. was seen today on 12/18/2024 at [facility].  Patient has a history of iron deficiency anemia, for which the patient follows with outside providers. Patient has no history of tonsillectomy, adenoidectomy, nasal surgery, UPPP. Patient presents today with symptoms of snoring, non-restorative sleep, frequent nighttime awakenings, trouble staying asleep at night, waking up gasping for breath at night, and suspected sleep apnea.  The symptoms are chronic in nature.  Patient reports that she startles awake at night feeling like she has to gasp for breath.  She is concerned about possible sleep apnea.  Does have family history in her father who is untreated.        SLEEP HISTORY:  Sleep schedule:  Bedtime: 11 PM  Wake time: 5 AM  Time it takes to fall asleep: 30 minutes  Average hours of sleep: 5  Number of naps per day: 0    Symptoms:   In addition to the above, patient reports the following associated symptoms:  Have you ever awakened gasping for breath, coughing, choking: Yes   Change in weight:  Yes, lost 25 pounds  Morning headaches:  No   Awaken with a sore throat or dry mouth:  No   Leg jerking at night:  No   Creepy crawly feeling in legs/urge to move legs: No   Teeth grinding: No   Have you ever awakened at night with a sour taste or burning sensation in your chest:  No   Do you have muscle weakness with laughing or anger:  No   Have you ever felt paralyzed while going to sleep or waking up:  No   Restless sleep: Yes  Sleepwalking: No   Nightmares: No   Nocturia  "(urination at night): 0 times per night  Memory Problem: No     Medical Conditions (PMH):   Deficiency anemia    Social history:  Do you drive a commercial vehicle:  No   Shift work:  No   Tobacco use:  No   Alcohol use: 0 per week  Caffeinated drinks: 3 sodas per day  Occupation: Nutritional services  Social: She has 3 children, ages 15, 7, and 6    Family History (parents and siblings) (pertaining to sleep medicine):  Sleep apnea in father    Medications: reviewed    Allergies:  Patient has no known allergies.      REVIEW OF SYSTEMS:  Pertinent positive symptoms are:  Snoring  Edinburgh Sleepiness Scale of Total score: 0   Fatigue         PHYSICAL EXAM:  CONSTITUTINONAL:   Vitals:    12/18/24 0946   Pulse: 78   SpO2: 98%   Weight: 117 kg (258 lb)   Height: 165.1 cm (65\")    Body mass index is 42.93 kg/m².   HEAD: atraumatic, normocephalic   THROAT: tonsils are not significant, Mallampati class III  NECK: Neck Circumference: 14 inches, trachea is midline  RESPIRATORY SYSTEM: Respirations even, unlabored, normal rate  CARDIOVASULAR SYSTEM: Normal rate  NEUROLOGICAL SYSTEM: Alert and oriented x 3  PSYCHIATRIC SYSTEM: Mood is normal/ appropriate     Office note(s) from care team reviewed. Office note(s) reviewed: 10/2/2024 internal medicine note    Labs/ Test Results Reviewed:  TSH          5/15/2024    12:15   TSH   TSH 1.180               ASSESSMENT AND PLAN:   Suspected sleep apnea: patient's symptoms and physical examination are concerning for possible sleep apnea.   I discussed the signs, symptoms, and pathophysiology of sleep apnea with this patient.  I also discussed the possible complications of untreated sleep apnea including but not limited to potential risk of resistant hypertension, insulin resistance, pulmonary hypertension, atrial fibrillation or other arrhythmias, heart attack, stroke, nonrestorative sleep with hypersomnia which can increase risk for motor vehicle accidents, etc.   Different testing " methods including home-based and lab based sleep studies were discussed with this patient.   Based on patient history and physical examination, will proceed with home sleep study.  The order for the sleep study is placed in [facility].  The test will be scheduled after prior authorization has been obtained through patient's insurance.  Discussed overview of treatment options for sleep apnea in the office today including PAP therapy, and treatment/ management will be discussed in more detail with this patient after the test is completed.  All questions were answered to patient's satisfaction.   Snoring: snoring is the sound created by turbulent airflow vibrating upper airway soft tissue.  I have also discussed factors affecting snoring including sleep deprivation, sleeping on the back and alcohol ingestion. To minimize snoring, patient is advised to have adequate sleep, sleep on their side, and avoid alcohol and sedative medications around bedtime. Do not drive, operate heavy machinery, or do activities that require high concentration if feeling tired/drowsy.  Severe Obesity: Body mass index is 42.93 kg/m².. Patients who are overweight or obese are at increased risk of sleep apnea/ sleep disordered breathing. Weight reduction and healthy lifestyle are encouraged in overweight/ obese patients as part of a comprehensive approach to sleep apnea treatment.     Trouble sleeping: She is waking up frequently at night.  Sometimes waking up gasping.  Evaluate for sleep apnea as a contributing factor, as above.  Will address further if sleep study negative for sleep apnea or symptoms not improved with addressing sleep apnea, as above.    I have also discussed with the patient the following  Adequate amount of sleep: most people need around 7 to 9 hours of sleep each night      Patient will follow-up after study, 31 to 90 days after PAP therapy initiated if applicable, or contact the office sooner for questions or concerns. Patient's  questions were answered.          Thank you again for asking me to consult on this patient.  Please do not hesitate to call me if you have additional questions or concerns.       Lora Bonilla DNP, APRN  [facility]

## 2025-01-08 ENCOUNTER — HOSPITAL ENCOUNTER (OUTPATIENT)
Dept: SLEEP MEDICINE | Facility: HOSPITAL | Age: 35
Discharge: HOME | End: 2025-01-08
Admitting: NURSE PRACTITIONER
Payer: COMMERCIAL

## 2025-01-08 DIAGNOSIS — G47.8 NON-RESTORATIVE SLEEP: ICD-10-CM

## 2025-01-08 DIAGNOSIS — G47.9 SLEEP DISTURBANCE: ICD-10-CM

## 2025-01-08 DIAGNOSIS — R29.818 SUSPECTED SLEEP APNEA: ICD-10-CM

## 2025-01-08 DIAGNOSIS — E66.01 SEVERE OBESITY (BMI >= 40): ICD-10-CM

## 2025-01-08 PROCEDURE — G0399 HOME SLEEP TEST/TYPE 3 PORTA: HCPCS

## 2025-01-13 ENCOUNTER — OFFICE VISIT (OUTPATIENT)
Dept: INTERNAL MEDICINE | Facility: CLINIC | Age: 35
End: 2025-01-13
Payer: COMMERCIAL

## 2025-01-13 VITALS
WEIGHT: 257.4 LBS | HEIGHT: 65 IN | BODY MASS INDEX: 42.88 KG/M2 | HEART RATE: 81 BPM | SYSTOLIC BLOOD PRESSURE: 128 MMHG | DIASTOLIC BLOOD PRESSURE: 88 MMHG | OXYGEN SATURATION: 98 %

## 2025-01-13 DIAGNOSIS — R30.0 DYSURIA: Primary | ICD-10-CM

## 2025-01-13 LAB
BILIRUB BLD-MCNC: NEGATIVE MG/DL
CLARITY, POC: CLEAR
COLOR UR: YELLOW
EXPIRATION DATE: ABNORMAL
GLUCOSE UR STRIP-MCNC: NEGATIVE MG/DL
KETONES UR QL: NEGATIVE
LEUKOCYTE EST, POC: NEGATIVE
Lab: ABNORMAL
NITRITE UR-MCNC: NEGATIVE MG/ML
PH UR: 8.5 [PH] (ref 5–8)
PROT UR STRIP-MCNC: NEGATIVE MG/DL
RBC # UR STRIP: NEGATIVE /UL
SP GR UR: 1.02 (ref 1–1.03)
UROBILINOGEN UR QL: ABNORMAL

## 2025-01-13 PROCEDURE — 81003 URINALYSIS AUTO W/O SCOPE: CPT | Performed by: NURSE PRACTITIONER

## 2025-01-13 PROCEDURE — 99213 OFFICE O/P EST LOW 20 MIN: CPT | Performed by: NURSE PRACTITIONER

## 2025-01-13 RX ORDER — ERGOCALCIFEROL 1.25 MG/1
50000 CAPSULE, LIQUID FILLED ORAL
Qty: 6 CAPSULE | Refills: 1 | Status: SHIPPED | OUTPATIENT
Start: 2025-01-13

## 2025-01-13 RX ORDER — FERROUS SULFATE 325(65) MG
325 TABLET ORAL DAILY
Qty: 90 TABLET | Refills: 1 | Status: SHIPPED | OUTPATIENT
Start: 2025-01-13

## 2025-01-13 RX ORDER — NITROFURANTOIN 25; 75 MG/1; MG/1
100 CAPSULE ORAL 2 TIMES DAILY
Qty: 14 CAPSULE | Refills: 0 | Status: SHIPPED | OUTPATIENT
Start: 2025-01-13 | End: 2025-01-20

## 2025-01-13 NOTE — PROGRESS NOTES
"        Chief Complaint  Urinary Tract Infection     Subjective:      History of Present Illness {CC  Problem List  Visit  Diagnosis   Encounters  Notes  Medications  Labs  Result Review Imaging  Media :23}     Wanda Solis presents to [facility] for:      Urinary Tract Infection   This is a new problem. The current episode started 1 to 4 weeks ago (2 weeks). The quality of the pain is described as aching. Associated symptoms include frequency and urgency. Pertinent negatives include no chills or hematuria. She has tried nothing (stopped sodas yesterday) for the symptoms. Her past medical history is significant for recurrent UTIs.     States she has been to urology in last 2 years for similar - can not find note in chart.           I have reviewed patient's medical history, any new submitted information provided by patient or medical assistant and updated medical record.      Objective:      Physical Exam  Cardiovascular:      Pulses: Normal pulses.   Pulmonary:      Effort: Pulmonary effort is normal.      Breath sounds: Normal breath sounds.   Abdominal:      Tenderness: There is no right CVA tenderness or left CVA tenderness.        Result Review  Data Reviewed:{ Labs  Result Review  Imaging  Med Tab  Media :23}     The following data was reviewed by: Barak Greenberg III, NP-C on 01/13/2025  UA          1/13/2025    13:30   Urinalysis   Ketones, UA Negative    Leukocytes, UA Negative             Vital Signs:   /88 (BP Location: Left arm, Patient Position: Sitting, Cuff Size: Adult)   Pulse 81   Ht 165.1 cm (65\")   Wt 117 kg (257 lb 6.4 oz)   SpO2 98%   BMI 42.83 kg/m²   Estimated body mass index is 42.83 kg/m² as calculated from the following:    Height as of this encounter: 165.1 cm (65\").    Weight as of this encounter: 117 kg (257 lb 6.4 oz).        Requested Prescriptions     Signed Prescriptions Disp Refills    nitrofurantoin, " macrocrystal-monohydrate, (Macrobid) 100 MG capsule 14 capsule 0     Sig: Take 1 capsule by mouth 2 (Two) Times a Day for 7 days.    ferrous sulfate 325 (65 FE) MG tablet 90 tablet 1     Sig: Take 1 tablet by mouth Daily.    vitamin D (ERGOCALCIFEROL) 1.25 MG (16044 UT) capsule capsule 6 capsule 1     Sig: Take 1 capsule by mouth Every 14 (Fourteen) Days.       Routine medications provided by this office will also be refilled via pharmacy request.       Current Outpatient Medications:     ferrous sulfate 325 (65 FE) MG tablet, Take 1 tablet by mouth Daily., Disp: 90 tablet, Rfl: 1    vitamin D (ERGOCALCIFEROL) 1.25 MG (14214 UT) capsule capsule, Take 1 capsule by mouth Every 14 (Fourteen) Days., Disp: 6 capsule, Rfl: 1    fluticasone (FLONASE) 50 MCG/ACT nasal spray, Administer 1 spray into the nostril(s) as directed by provider 2 (Two) Times a Day. (Patient not taking: Reported on 1/13/2025), Disp: 9.9 mL, Rfl: 0    nitrofurantoin, macrocrystal-monohydrate, (Macrobid) 100 MG capsule, Take 1 capsule by mouth 2 (Two) Times a Day for 7 days., Disp: 14 capsule, Rfl: 0     Assessment and Plan:      Assessment and Plan {CC Problem List  Visit Diagnosis  ROS  Review (Popup)  [facility] Maintenance  Quality  BestPractice  Medications  SmartSets  SnapShot Encounters  Media :23}     Diagnoses and all orders for this visit:    1. Dysuria (Primary)  -     POCT urinalysis dipstick, automated  -     Urine Culture - Urine, Urine, Clean Catch  -     nitrofurantoin, macrocrystal-monohydrate, (Macrobid) 100 MG capsule; Take 1 capsule by mouth 2 (Two) Times a Day for 7 days.  Dispense: 14 capsule; Refill: 0    Other orders  -     ferrous sulfate 325 (65 FE) MG tablet; Take 1 tablet by mouth Daily.  Dispense: 90 tablet; Refill: 1  -     vitamin D (ERGOCALCIFEROL) 1.25 MG (69623 UT) capsule capsule; Take 1 capsule by mouth Every 14 (Fourteen) Days.  Dispense: 6 capsule; Refill: 1    Will treat based on symptoms - send out urine  for culture and will update her with any change in treatment needed.          New Medications Ordered This Visit   Medications    nitrofurantoin, macrocrystal-monohydrate, (Macrobid) 100 MG capsule     Sig: Take 1 capsule by mouth 2 (Two) Times a Day for 7 days.     Dispense:  14 capsule     Refill:  0    ferrous sulfate 325 (65 FE) MG tablet     Sig: Take 1 tablet by mouth Daily.     Dispense:  90 tablet     Refill:  1    vitamin D (ERGOCALCIFEROL) 1.25 MG (47999 UT) capsule capsule     Sig: Take 1 capsule by mouth Every 14 (Fourteen) Days.     Dispense:  6 capsule     Refill:  1             Follow Up {Instructions Charge Capture  Follow-up Communications :23}     Return in about 2 months (around 3/13/2025), or if symptoms worsen or fail to improve, for Annual physical.      Patient was given instructions and counseling regarding her condition or for health maintenance advice. Please see specific information pulled into the AVS if appropriate.    Dragon disclaimer:   Much of this encounter note is an electronic transcription/translation of spoken language to printed text. The electronic translation of spoken language may permit erroneous, or at times, nonsensical words or phrases to be inadvertently transcribed; Although I have reviewed the note for such errors, some may still exist.     Additional Patient Counseling:       Patient Instructions   You have been diagnosed with a urinary tract infection (UTI). An antibiotic has been prescribed for you that needs to be taken until gone, even if you feel better prior to completing the medication. It is recommended that you take a probiotic AFTER completing your antibiotic course for about 30 days; probiotics are available over the counter in pill form and can be found in certain yogurt brands. Increase you intake of water; you may also drink low sugar cranberry juice. Avoid caffeine as this tends to irritate the bladder wall.     If culture done on urine and started  on antibiotic, you will be notified if bacteria is not sensitive to antibiotic and needs to be changed.     You may take over the counter AZO for no more than 3 days for bladder pain; AZO will turn you urine very orange or red. Use Motrin or Tylenol for fever/pain if no contradictions .   For recurrent infections it is best to avoid bathing in a tub, always wipe from front to back, urinate before and after sexual intercourse, avoid tight fitting pants, and wear cotton underwear.  If you develop fever, nausea, vomiting, flank pain notify your provider.

## 2025-01-13 NOTE — PATIENT INSTRUCTIONS
You have been diagnosed with a urinary tract infection (UTI). An antibiotic has been prescribed for you that needs to be taken until gone, even if you feel better prior to completing the medication. It is recommended that you take a probiotic AFTER completing your antibiotic course for about 30 days; probiotics are available over the counter in pill form and can be found in certain yogurt brands. Increase you intake of water; you may also drink low sugar cranberry juice. Avoid caffeine as this tends to irritate the bladder wall.     If culture done on urine and started on antibiotic, you will be notified if bacteria is not sensitive to antibiotic and needs to be changed.     You may take over the counter AZO for no more than 3 days for bladder pain; AZO will turn you urine very orange or red. Use Motrin or Tylenol for fever/pain if no contradictions .   For recurrent infections it is best to avoid bathing in a tub, always wipe from front to back, urinate before and after sexual intercourse, avoid tight fitting pants, and wear cotton underwear.  If you develop fever, nausea, vomiting, flank pain notify your provider.

## 2025-01-15 LAB
BACTERIA UR CULT: NO GROWTH
BACTERIA UR CULT: NORMAL

## 2025-01-16 ENCOUNTER — TELEPHONE (OUTPATIENT)
Dept: SLEEP MEDICINE | Facility: HOSPITAL | Age: 35
End: 2025-01-16
Payer: COMMERCIAL

## 2025-02-19 ENCOUNTER — OFFICE VISIT (OUTPATIENT)
Dept: SLEEP MEDICINE | Facility: HOSPITAL | Age: 35
End: 2025-02-19
Payer: COMMERCIAL

## 2025-02-19 VITALS — HEIGHT: 65 IN | HEART RATE: 85 BPM | OXYGEN SATURATION: 97 % | BODY MASS INDEX: 43.32 KG/M2 | WEIGHT: 260 LBS

## 2025-02-19 DIAGNOSIS — G47.9 SLEEP DISTURBANCE: ICD-10-CM

## 2025-02-19 DIAGNOSIS — G47.8 NON-RESTORATIVE SLEEP: ICD-10-CM

## 2025-02-19 DIAGNOSIS — E66.01 SEVERE OBESITY (BMI >= 40): ICD-10-CM

## 2025-02-19 DIAGNOSIS — R29.818 SUSPECTED SLEEP APNEA: Primary | ICD-10-CM

## 2025-02-19 PROCEDURE — G0463 HOSPITAL OUTPT CLINIC VISIT: HCPCS

## 2025-02-19 NOTE — PROGRESS NOTES
[facility]  [address]  [address]  [place] [address]  Phone   Fax         SLEEP CLINIC FOLLOW-UP PROGRESS NOTE    Wanda Solis  [id number]   1990  34 y.o.  female      PCP: Barak Greenberg III, NP-C    DATE OF VISIT: 2/19/2025          CHIEF COMPLAINT: Suspected sleep apnea    HPI:  This is a 34 y.o. year old patient who presents to the clinic today for follow-up on suspected sleep apnea.  She had a home sleep study 1/2025 showing AHI of 0.4/h, however it appears this was scored using AASM 4% desaturation guidelines for hypopneas, whereas her insurance should accept AASM 3% desaturation scoring.  She continues to have trouble sleeping at night.  She is still concerned about sleep apnea risk as she had had multiple recurrent episodes of waking up gasping for breath at night.  On her sleep questionnaire it looks like she reported waking up maybe 4 times the night of the sleep study and she had estimated she slept may be 4 hours out of the 6 hours and 20 minutes that she thought she had the equipment on.  Due to this I would be concerned that maybe we had underestimated or missed sleep apnea.  We discussed rhe scoring study at 3% desaturation standards, however if negative for sleep apnea still, will proceed with in lab polysomnogram.  Patient denies history of falls.  Not drinking alcohol or using sedative medications or substances.  We did discuss possible one-time low-dose half a tablet of Ambien if needed to fall asleep night of in-lab study.  Discussed potential risks of sleep aid, discussed importance of not mixing with other sedative medications or substances, discussed not driving or operating heavy machinery or taking before arriving.  She would like to try low-dose melatonin over-the-counter first and if this works for her she would like to bring this in instead of taking the Ambien.  However if she is still not sleeping well closer  "to the time of the study and she will let us know.    Getting in bed around 10pm.  Asleep by 10:30pm if not looking at phone, sometimes might be on phone for a while before going to sleep.  Wakes up after an hour, or if really tired wakes up around 2am.  Gets up to use restroom but does not think that wakes her up.  Feels energized, trouble falling back to sleep but then feels tired around 4:45am when needing to get up for work.  If wakes up after an hour can fall back to sleep more easily.  Not napping.  Cut out soda. Even on nights she has slept well, wakes up tired, not restored.  We did discuss most people need 7 to 9 hours of sleep per night.  It is hard for her to get to bed earlier than what she is currently doing as she gets home from work late and then has to care for her kids.          MEDICATIONS: reviewed     ALLERGIES:  Patient has no known allergies.    SOCIAL HISTORY (habits pertaining to sleep medicine):  Tobacco use: No   Alcohol use: 0 per week  Caffeine use: 0     REVIEW OF SYSTEMS:   Pertinent positive symptoms are:  Negative for chest pain, dyspnea, abdominal bloating        PHYSICAL EXAMINATION:  CONSTITUTIONAL:  Vitals:    02/19/25 0900   Pulse: 85   SpO2: 97%   Weight: 118 kg (260 lb)   Height: 165.1 cm (65\")    Body mass index is 43.27 kg/m².   HEAD: atraumatic, normocephalic  RESP SYSTEM: not in respiratory distress, breathing unlabored  CARDIOVASULAR: normal rate, no edema noted   NEURO: Alert and oriented x 3, mood and affect appeared appropriate          ASSESSMENT AND PLAN:  Suspected sleep apnea: Rescored study using 3% desaturation for hypopneas and AHI was 2/h.  Her handout that patient turned in with HST, patient thought maybe she slept about 4 hours night of study, did have 4 nighttime awakenings.  She continues to have snoring, sleep disturbance, nonrestorative sleep, and has had multiple episodes in the past of waking up gasping for breath at times though no episodes in the past " month.  However given her symptoms and continued concern for possible sleep apnea, will proceed with in lab polysomnogram.  Order placed.  Patient will let us know if she feels sleep aid needs to be called in prior to study to help aid in sleep efficiency night of study.  We did discuss potential benefits versus risks and discussed not driving or operating heavy machinery if using sleep aid, not taking before arriving, and not mixing with other sedative medications or substances or alcohol.  Severe Obesity: Body mass index is 43.27 kg/m².. Patients who are overweight or obese are at increased risk of sleep apnea/ sleep disordered breathing. Weight reduction and healthy lifestyle are encouraged in overweight/ obese patients as part of a comprehensive approach to sleep apnea treatment.     Sleep disturbance/ trouble sleeping: Trouble staying asleep at night, may wake up at 2 AM, get up to use the restroom, and then feel wide-awake, unable to fall back to sleep.  History of recurrent episodes waking up gasping.  Evaluate for sleep apnea, as above.  Continue to avoid sedative substances.  Discussed healthy sleep habits and try to keep electronics out of the bedroom.  Does have some chronic stress but does not feel having anxiety or depression.  Consider referral for CBT-I if issues persist despite addressing or ruling out sleep apnea.      Patient will follow-up after study or follow-up sooner for any issues or concerns.  Patient's questions were answered.        Thank you for allowing me to participate in the care of this patient.     Lora Bonilla DNP, APRPAULETTE  [facility]

## 2025-05-28 ENCOUNTER — OFFICE VISIT (OUTPATIENT)
Dept: INTERNAL MEDICINE | Facility: CLINIC | Age: 35
End: 2025-05-28
Payer: COMMERCIAL

## 2025-05-28 VITALS
WEIGHT: 268.4 LBS | HEART RATE: 95 BPM | BODY MASS INDEX: 44.72 KG/M2 | SYSTOLIC BLOOD PRESSURE: 124 MMHG | HEIGHT: 65 IN | OXYGEN SATURATION: 100 % | DIASTOLIC BLOOD PRESSURE: 72 MMHG

## 2025-05-28 DIAGNOSIS — L73.2 HIDRADENITIS: ICD-10-CM

## 2025-05-28 DIAGNOSIS — Z00.00 ANNUAL PHYSICAL EXAM: Primary | ICD-10-CM

## 2025-05-28 DIAGNOSIS — D50.8 IRON DEFICIENCY ANEMIA SECONDARY TO INADEQUATE DIETARY IRON INTAKE: Chronic | ICD-10-CM

## 2025-05-28 DIAGNOSIS — R73.09 ELEVATED GLUCOSE: ICD-10-CM

## 2025-05-28 DIAGNOSIS — E55.9 VITAMIN D DEFICIENCY: ICD-10-CM

## 2025-05-28 RX ORDER — CEPHALEXIN 500 MG/1
500 CAPSULE ORAL 3 TIMES DAILY
Qty: 21 CAPSULE | Refills: 0 | Status: SHIPPED | OUTPATIENT
Start: 2025-05-28 | End: 2025-06-04

## 2025-05-28 RX ORDER — OXYBUTYNIN CHLORIDE 5 MG/1
TABLET, EXTENDED RELEASE ORAL
COMMUNITY
Start: 2025-05-14

## 2025-05-28 NOTE — PROGRESS NOTES
Chief Complaint  Annual Exam     Subjective:      History of Present Illness {CC  Problem List  Visit  Diagnosis   Encounters  Notes  Medications  Labs  Result Review Imaging  Media :23}     Wanda Solis presents to [facility] for:  annual exam excluding GYN exam.       Sleep study: 1/8/25  Home Sleep Study (01/09/2025 11:05 AM) : it was normal  Plan is to repeat during observation.  She has not scheduled yet.     OAB:    hasn't taken medication because no longer had any issues. (Was prescribed ditropan by urology)  Not clear if brought on by drinking more sodas.   States was last sexually active in April - just before May visit for polyuria.     KY: continues iron supplement but not regularly.     Elevated glucose - denies family hx diabetes.     HS: she has been seen by dermatology.  Due for follow up.   States she has been having some drainage under left arm pit. No fever or chills.             Wanda is here for coordination of medical care, to discuss health maintenance, disease prevention as well as to followup on medical problems.     Patient Care Team:  Barak Greenberg III, NP-C as PCP - General (Internal Medicine)  Amor Michele MD as Consulting Physician (Obstetrics and Gynecology)  Mary Schmitt MD (Dermatology)     Activity level is minimal.      Health and Weight:   Weight trend is   Wt Readings from Last 4 Encounters:   05/28/25 122 kg (268 lb 6.4 oz)   03/16/25 118 kg (260 lb)   02/19/25 118 kg (260 lb)   01/13/25 117 kg (257 lb 6.4 oz)       Class 3 Severe Obesity (BMI >=40). Obesity-related health conditions include the following: impaired fasting glucose. Obesity is unchanged. BMI is is above average; BMI management plan is completed. We discussed low calorie, low carb based diet program, portion control, and increasing exercise.      Health Maintenance Female:  GYN:    Patient's last mammogram was  Last  Completed Mammogram    This patient has no relevant Health Maintenance data.        Advised routine self-breast exams monthly.  Pap smears have been:   Same partner for 5 years. States he is 50 yoa and doesn't want more children.     Postmenopausal: [] Yes     UroGYN: Karina Orellana on 5/2/25  OAB, pelvic floor instability: advised PT and detrol LA     STI Screen:   [] HIV     [] Syphilis   [] Chlamydia/ Gonorrhea   [] Declines STD screen  If tests ordered, patient was informed HIV results will not show up on my chart.     Colon cancer screen:   Colonoscopy due at 43 yoa unless clinically indicated before.     Vaccines:   [] Flu     [] Tdap   [] Prevnar 20    [] Shingrix (shingles: series of 2)   [] COVID (booster)    []   []Declines vaccines      Last eye exam: advise routinely and for any changes.     Advise regular sunscreen.      Her cardiovascular risks are:     [] No Known risk factors    [] Hypertension   [] Hyperlipidemia  [] Diabetes    [] Obesity  [] Family history   [] Current or hx tobacco use  [] Sedentary lifestyle   [] Post-menopausal      I have reviewed patient's medical history, any new submitted information provided by patient or medical assistant and updated medical record.      Objective:      Physical Exam  Vitals reviewed.   Constitutional:       Appearance: Normal appearance. She is well-developed. She is obese.   Neck:      Thyroid: No thyromegaly.   Cardiovascular:      Rate and Rhythm: Normal rate and regular rhythm.      Pulses: Normal pulses.      Heart sounds: Normal heart sounds.   Pulmonary:      Effort: Pulmonary effort is normal.      Breath sounds: Normal breath sounds.      Comments: E/U   Chest:       Musculoskeletal:         General: Normal range of motion.      Cervical back: Normal range of motion and neck supple.   Lymphadenopathy:      Cervical: No cervical adenopathy.   Skin:     General: Skin is warm and dry.      Capillary Refill: Capillary refill takes 2 to 3 seconds.  "  Neurological:      Mental Status: She is alert and oriented to person, place, and time.   Psychiatric:         Mood and Affect: Mood normal.         Behavior: Behavior normal. Behavior is cooperative.         Thought Content: Thought content normal.         Judgment: Judgment normal.        Result Review  Data Reviewed:{ Labs  Result Review  Imaging  Med Tab  Media :23}     Elevated glucose: 112 on 3/9/25 at [facility]      The following data was reviewed by: Barak Greenberg III, NP-C on 05/28/2025  Common labs          5/28/2025    14:48   Common Labs   Glucose 71    BUN 11    Creatinine 0.72    Sodium 143    Potassium 4.6    Chloride 108    Calcium 9.0    Albumin 3.8    Total Bilirubin <0.2    Alkaline Phosphatase 73    AST (SGOT) 12    ALT (SGPT) 11    WBC 6.2    Hemoglobin 12.0    Hematocrit 37.1    Platelets 275    Total Cholesterol 131    Triglycerides 62    HDL Cholesterol 37    LDL Cholesterol  81    Hemoglobin A1C 5.3             Vital Signs:   /72 (BP Location: Left arm, Patient Position: Sitting, Cuff Size: Adult)   Pulse 95   Ht 165.1 cm (65\")   Wt 122 kg (268 lb 6.4 oz)   SpO2 100%   BMI 44.66 kg/m²   Estimated body mass index is 44.66 kg/m² as calculated from the following:    Height as of this encounter: 165.1 cm (65\").    Weight as of this encounter: 122 kg (268 lb 6.4 oz).        Requested Prescriptions     Signed Prescriptions Disp Refills    cephalexin (KEFLEX) 500 MG capsule 21 capsule 0     Sig: Take 1 capsule by mouth 3 (Three) Times a Day for 7 days.       Routine medications provided by this office will also be refilled via pharmacy request.       Current Outpatient Medications:     ferrous sulfate 325 (65 FE) MG tablet, Take 1 tablet by mouth Daily., Disp: 90 tablet, Rfl: 1    oxybutynin XL (DITROPAN-XL) 5 MG 24 hr tablet, , Disp: , Rfl:     vitamin D (ERGOCALCIFEROL) 1.25 MG (70833 UT) capsule capsule, Take 1 capsule by mouth Every 14 (Fourteen) " Days., Disp: 6 capsule, Rfl: 1     Assessment and Plan:      Assessment and Plan {CC Problem List  Visit Diagnosis  ROS  Review (Popup)  Health Maintenance  Quality  BestPractice  Medications  SmartSets  SnapShot Encounters  Media :23}     Diagnoses and all orders for this visit:    1. Annual physical exam (Primary)  -     Comprehensive Metabolic Panel  -     Lipid Panel With LDL / HDL Ratio  -     CBC (No Diff)  -     TSH Rfx On Abnormal To Free T4  -     Chlamydia trachomatis, Neisseria gonorrhoeae, PCR - Urine, Urine, Clean Catch  -     HIV-1 / O / 2 Ag / Antibody  -     RPR Qualitative with Reflex to Quant    2. Iron deficiency anemia secondary to inadequate dietary iron intake  -     CBC (No Diff)  -     Ferritin    3. Vitamin D deficiency  -     Vitamin D 25 hydroxy    4. Hidradenitis  Overview:  Prior treatment:  Benzoyl peroxide, Hibiclens, doxycyline     Prescribed humira but did not start    Derm: Keshia     Orders:  -     Ambulatory Referral to Dermatology  -     cephalexin (KEFLEX) 500 MG capsule; Take 1 capsule by mouth 3 (Three) Times a Day for 7 days.  Dispense: 21 capsule; Refill: 0    5. Elevated glucose  -     Hemoglobin A1c      Once sleep study done: could evaluate if candidate for GLP1.          New Medications Ordered This Visit   Medications    cephalexin (KEFLEX) 500 MG capsule     Sig: Take 1 capsule by mouth 3 (Three) Times a Day for 7 days.     Dispense:  21 capsule     Refill:  0             Follow Up {Instructions Charge Capture  Follow-up Communications :23}     Return in about 6 months (around 11/28/2025), or if symptoms worsen or fail to improve.  If unable to get into dermatology for follow up: RTO 3-4 weeks.     Patient was given instructions and counseling regarding her condition or for health maintenance advice. Please see specific information pulled into the AVS if appropriate.    Dragon disclaimer:   Much of this encounter note is an electronic  transcription/translation of spoken language to printed text. The electronic translation of spoken language may permit erroneous, or at times, nonsensical words or phrases to be inadvertently transcribed; Although I have reviewed the note for such errors, some may still exist.     Additional Patient Counseling:       There are no Patient Instructions on file for this visit.

## 2025-05-30 LAB
25(OH)D3+25(OH)D2 SERPL-MCNC: 20.3 NG/ML (ref 30–100)
ALBUMIN SERPL-MCNC: 3.8 G/DL (ref 3.9–4.9)
ALP SERPL-CCNC: 73 IU/L (ref 44–121)
ALT SERPL-CCNC: 11 IU/L (ref 0–32)
AST SERPL-CCNC: 12 IU/L (ref 0–40)
BILIRUB SERPL-MCNC: <0.2 MG/DL (ref 0–1.2)
BUN SERPL-MCNC: 11 MG/DL (ref 6–20)
BUN/CREAT SERPL: 15 (ref 9–23)
C TRACH RRNA SPEC QL NAA+PROBE: NEGATIVE
CALCIUM SERPL-MCNC: 9 MG/DL (ref 8.7–10.2)
CHLORIDE SERPL-SCNC: 108 MMOL/L (ref 96–106)
CHOLEST SERPL-MCNC: 131 MG/DL (ref 100–199)
CO2 SERPL-SCNC: 23 MMOL/L (ref 20–29)
CREAT SERPL-MCNC: 0.72 MG/DL (ref 0.57–1)
EGFRCR SERPLBLD CKD-EPI 2021: 110 ML/MIN/1.73
ERYTHROCYTE [DISTWIDTH] IN BLOOD BY AUTOMATED COUNT: 13.9 % (ref 11.7–15.4)
FERRITIN SERPL-MCNC: 24 NG/ML (ref 15–150)
GLOBULIN SER CALC-MCNC: 3.3 G/DL (ref 1.5–4.5)
GLUCOSE SERPL-MCNC: 71 MG/DL (ref 70–99)
HBA1C MFR BLD: 5.3 % (ref 4.8–5.6)
HCT VFR BLD AUTO: 37.1 % (ref 34–46.6)
HDLC SERPL-MCNC: 37 MG/DL
HGB BLD-MCNC: 12 G/DL (ref 11.1–15.9)
HIV 1+2 AB+HIV1 P24 AG SERPL QL IA: NON REACTIVE
LDLC SERPL CALC-MCNC: 81 MG/DL (ref 0–99)
LDLC/HDLC SERPL: 2.2 RATIO (ref 0–3.2)
MCH RBC QN AUTO: 27.6 PG (ref 26.6–33)
MCHC RBC AUTO-ENTMCNC: 32.3 G/DL (ref 31.5–35.7)
MCV RBC AUTO: 86 FL (ref 79–97)
N GONORRHOEA RRNA SPEC QL NAA+PROBE: NEGATIVE
PLATELET # BLD AUTO: 275 X10E3/UL (ref 150–450)
POTASSIUM SERPL-SCNC: 4.6 MMOL/L (ref 3.5–5.2)
PROT SERPL-MCNC: 7.1 G/DL (ref 6–8.5)
RBC # BLD AUTO: 4.34 X10E6/UL (ref 3.77–5.28)
RPR SER QL: NON REACTIVE
SODIUM SERPL-SCNC: 143 MMOL/L (ref 134–144)
TRIGL SERPL-MCNC: 62 MG/DL (ref 0–149)
TSH SERPL DL<=0.005 MIU/L-ACNC: 1.6 UIU/ML (ref 0.45–4.5)
VLDLC SERPL CALC-MCNC: 13 MG/DL (ref 5–40)
WBC # BLD AUTO: 6.2 X10E3/UL (ref 3.4–10.8)

## 2025-06-18 ENCOUNTER — OFFICE VISIT (OUTPATIENT)
Dept: INTERNAL MEDICINE | Facility: CLINIC | Age: 35
End: 2025-06-18
Payer: COMMERCIAL

## 2025-06-18 VITALS
WEIGHT: 271.5 LBS | SYSTOLIC BLOOD PRESSURE: 120 MMHG | HEART RATE: 60 BPM | HEIGHT: 65 IN | DIASTOLIC BLOOD PRESSURE: 78 MMHG | BODY MASS INDEX: 45.23 KG/M2 | OXYGEN SATURATION: 98 %

## 2025-06-18 DIAGNOSIS — R42 DIZZINESS: Primary | ICD-10-CM

## 2025-06-18 PROCEDURE — 99213 OFFICE O/P EST LOW 20 MIN: CPT | Performed by: NURSE PRACTITIONER

## 2025-06-18 RX ORDER — MECLIZINE HYDROCHLORIDE 25 MG/1
25 TABLET ORAL 3 TIMES DAILY PRN
Qty: 30 TABLET | Refills: 0 | Status: SHIPPED | OUTPATIENT
Start: 2025-06-18

## 2025-06-18 NOTE — PROGRESS NOTES
Chief Complaint  Balance Issues and Dizziness     Subjective:      History of Present Illness {CC  Problem List  Visit  Diagnosis   Encounters  Notes  Medications  Labs  Result Review Imaging  Media :23}     Wanda Solis presents to [facility] for:        Recurrent:   She has been evaluated by neurology:   Progress Notes by Byron Mendez APRN (05/15/2024 11:30 AM)   MRI neg except partially empty sella   Referred for vestibular evaluation   She has been seen by ENT in the past as well. Did not feel was vertigo.  (2/8/24)    Light headed, dizzy when going up steps or when she has been standing at work.   States she stays hydrated.   Can't give me a diet recall but states she eats snacks at work.   She works at [employer]  in .   States when she is not working - generally doesn't have the problem.       Previously - after abx - symptoms could worsen.   Recent abx due to HS flare up left axilla.   She states dermatology has called her but she hasn't scheduled.     No fever or chills. No change in vision.   She is not following routine exercise.     She has sleep study scheduled for 8/5/25    States never took oxybutynin - will remove it from his list today.         HPI        I have reviewed patient's medical history, any new submitted information provided by patient or medical assistant and updated medical record.      Objective:      Physical Exam  Constitutional:       Appearance: She is obese.   Eyes:      Conjunctiva/sclera: Conjunctivae normal.   Cardiovascular:      Rate and Rhythm: Normal rate.      Pulses: Normal pulses.      Heart sounds: Normal heart sounds.   Pulmonary:      Effort: Pulmonary effort is normal.      Breath sounds: Normal breath sounds. No wheezing or rhonchi.   Abdominal:      General: Bowel sounds are normal.   Neurological:      General: No focal deficit present.      Mental Status: She is oriented to  "person, place, and time.      Cranial Nerves: No cranial nerve deficit.      Motor: No weakness.      Gait: Gait normal.   Psychiatric:         Mood and Affect: Mood normal.        Result Review  Data Reviewed:{ Labs  Result Review  Imaging  Med Tab  Media :23}                Vital Signs:   /78 (BP Location: Left arm, Patient Position: Sitting, Cuff Size: Adult)   Pulse 60   Ht 165.1 cm (65\")   Wt 123 kg (271 lb 8 oz)   SpO2 98%   BMI 45.18 kg/m²   Estimated body mass index is 45.18 kg/m² as calculated from the following:    Height as of this encounter: 165.1 cm (65\").    Weight as of this encounter: 123 kg (271 lb 8 oz).        Requested Prescriptions     Signed Prescriptions Disp Refills    meclizine (ANTIVERT) 25 MG tablet 30 tablet 0     Sig: Take 1 tablet by mouth 3 (Three) Times a Day As Needed for Dizziness.       Routine medications provided by this office will also be refilled via pharmacy request.       Current Outpatient Medications:     ferrous sulfate 325 (65 FE) MG tablet, Take 1 tablet by mouth Daily., Disp: 90 tablet, Rfl: 1    oxybutynin XL (DITROPAN-XL) 5 MG 24 hr tablet, , Disp: , Rfl:     vitamin D (ERGOCALCIFEROL) 1.25 MG (68904 UT) capsule capsule, Take 1 capsule by mouth Every 14 (Fourteen) Days., Disp: 6 capsule, Rfl: 1    meclizine (ANTIVERT) 25 MG tablet, Take 1 tablet by mouth 3 (Three) Times a Day As Needed for Dizziness., Disp: 30 tablet, Rfl: 0     Assessment and Plan:      Assessment and Plan {CC Problem List  Visit Diagnosis  ROS  Review (Popup)  [facility] Maintenance  Quality  BestPractice  Medications  SmartSets  SnapShot Encounters  Media :23}     Diagnoses and all orders for this visit:    1. Dizziness (Primary)  -     meclizine (ANTIVERT) 25 MG tablet; Take 1 tablet by mouth 3 (Three) Times a Day As Needed for Dizziness.  Dispense: 30 tablet; Refill: 0  -     Ambulatory Referral to Cardiology for Other (dizziness); dizziness, shortness of breath  -     CT " angiogram head; Future  -     CT angiogram neck w wo contrast; Future        This has been recurrent.  At neurology, she had stated symptoms have been improving.  She was recently on abx but denies diarrhea.   There was question if antivert helped in the past.     Plans on sleep study.   Given the hx SOA, dizziness when going up stairs - will send for cardiac eval.   She does have family hx of heart disease.     Will check CTA.   Dicussed diet, weight loss and increasing exercise.            New Medications Ordered This Visit   Medications    meclizine (ANTIVERT) 25 MG tablet     Sig: Take 1 tablet by mouth 3 (Three) Times a Day As Needed for Dizziness.     Dispense:  30 tablet     Refill:  0             Follow Up {Instructions Charge Capture  Follow-up Communications :23}     No follow-ups on file.      Patient was given instructions and counseling regarding her condition or for health maintenance advice. Please see specific information pulled into the AVS if appropriate.    Dragon disclaimer:   Much of this encounter note is an electronic transcription/translation of spoken language to printed text. The electronic translation of spoken language may permit erroneous, or at times, nonsensical words or phrases to be inadvertently transcribed; Although I have reviewed the note for such errors, some may still exist.     Additional Patient Counseling:       There are no Patient Instructions on file for this visit.

## 2025-06-20 ENCOUNTER — TELEPHONE (OUTPATIENT)
Dept: INTERNAL MEDICINE | Facility: CLINIC | Age: 35
End: 2025-06-20
Payer: COMMERCIAL

## 2025-06-20 ENCOUNTER — OFFICE VISIT (OUTPATIENT)
Dept: OBSTETRICS AND GYNECOLOGY | Facility: CLINIC | Age: 35
End: 2025-06-20
Payer: COMMERCIAL

## 2025-06-20 VITALS
SYSTOLIC BLOOD PRESSURE: 125 MMHG | WEIGHT: 270 LBS | BODY MASS INDEX: 44.98 KG/M2 | DIASTOLIC BLOOD PRESSURE: 87 MMHG | HEIGHT: 65 IN | HEART RATE: 87 BPM

## 2025-06-20 DIAGNOSIS — N64.53 NIPPLE RETRACTION: ICD-10-CM

## 2025-06-20 DIAGNOSIS — Z01.419 ENCOUNTER FOR GYNECOLOGICAL EXAMINATION WITHOUT ABNORMAL FINDING: Primary | ICD-10-CM

## 2025-06-20 PROCEDURE — 99395 PREV VISIT EST AGE 18-39: CPT | Performed by: OBSTETRICS & GYNECOLOGY

## 2025-06-20 NOTE — TELEPHONE ENCOUNTER
----- Message from Barak Greenberg sent at 6/20/2025  3:35 PM EDT -----  Please call her - tell her I want to get a different type of CT of her head just to rule out any vascular issues.     I have placed the order and they will contact her for an appointment.     MAME

## 2025-06-20 NOTE — PROGRESS NOTES
GYN Annual Exam     CC- Here for annual exam.     Wanda Solis is a 35 y.o. female who presents for annual well woman exam. Periods are regular every 28-30 days, lasting 6 days. Dysmenorrhea:mild, occurring first 1-2 days of flow. Cyclic symptoms include none. No intermenstrual bleeding, spotting, or discharge.    OB History          5    Para   3    Term   3            AB   2    Living   3         SAB   1    IAB        Ectopic        Molar        Multiple   0    Live Births   3                Current contraception: none  History of abnormal Pap smear: yes - no treatment required   Family history of uterine, colon or ovarian cancer: yes - Colon in father (60+ age)   History of abnormal mammogram: no  Family history of breast cancer: no  Pap : 2024 NIL HPV neg       Past Medical History:   Diagnosis Date    Abnormal Pap smear of cervix     follow up wnl     Anemia     Hidradenitis suppurativa     Obesity affecting pregnancy in third trimester 2017    Obesity in pregnancy, antepartum, third trimester 2017    Postpartum care and examination immediately after delivery 12/15/2017    Supervision of normal pregnancy in third trimester 2019    Urogenital trichomoniasis        Past Surgical History:   Procedure Laterality Date    DILATATION AND CURETTAGE      INCISION AND DRAINAGE PERIRECTAL ABSCESS Left 2018    Procedure: INCISION AND DRAINAGE OF LEFT BREAST;  Surgeon: Parvez Long MD;  Location: [facility];  Service: General    WISDOM TOOTH EXTRACTION           Current Outpatient Medications:     ferrous sulfate 325 (65 FE) MG tablet, Take 1 tablet by mouth Daily., Disp: 90 tablet, Rfl: 1    meclizine (ANTIVERT) 25 MG tablet, Take 1 tablet by mouth 3 (Three) Times a Day As Needed for Dizziness., Disp: 30 tablet, Rfl: 0    oxybutynin XL (DITROPAN-XL) 5 MG 24 hr tablet, , Disp: , Rfl:     vitamin D (ERGOCALCIFEROL) 1.25 MG (55912 UT) capsule capsule, Take 1 capsule by  "mouth Every 14 (Fourteen) Days., Disp: 6 capsule, Rfl: 1    No Known Allergies    Social History     Tobacco Use    Smoking status: Never     Passive exposure: Never    Smokeless tobacco: Never   Vaping Use    Vaping status: Never Used   Substance Use Topics    Alcohol use: No    Drug use: No       Family History   Problem Relation Age of Onset    Colon cancer Father     Heart disease Father     No Known Problems Mother     Breast cancer Neg Hx     Ovarian cancer Neg Hx     Uterine cancer Neg Hx     Deep vein thrombosis Neg Hx     Pulmonary embolism Neg Hx        Review of Systems   Constitutional:  Negative for chills and fever.   Gastrointestinal:  Negative for abdominal pain, constipation and diarrhea.   Genitourinary:  Negative for menstrual problem, pelvic pain, vaginal bleeding and vaginal discharge.   All other systems reviewed and are negative.      /87   Pulse 87   Ht 165.1 cm (65\")   Wt 122 kg (270 lb)   LMP  (Within Weeks)   BMI 44.93 kg/m²     Physical Exam  Constitutional:       General: She is not in acute distress.     Appearance: She is well-developed and normal weight.   Genitourinary:      Vulva normal.      Right Labia: No lesions or Bartholin's cyst.     Left Labia: No lesions or Bartholin's cyst.     No inguinal adenopathy present in the right or left side.     No vaginal discharge or bleeding.        Right Adnexa: not tender, not full and no mass present.     Left Adnexa: not tender, not full and no mass present.     No cervical motion tenderness or friability.      Uterus is not enlarged or tender.      No uterine mass detected.     Uterus is anteverted.   Breasts:     Right: Inverted nipple present. No mass, nipple discharge, skin change or tenderness.      Left: No inverted nipple, mass, nipple discharge, skin change or tenderness.   HENT:      Head: Normocephalic and atraumatic.      Nose: Nose normal.   Eyes:      Conjunctiva/sclera: Conjunctivae normal.      Pupils: Pupils are " equal, round, and reactive to light.   Neck:      Thyroid: No thyromegaly.   Cardiovascular:      Rate and Rhythm: Normal rate and regular rhythm.      Heart sounds: Normal heart sounds. No murmur heard.  Pulmonary:      Effort: Pulmonary effort is normal. No respiratory distress.      Breath sounds: Normal breath sounds.   Abdominal:      General: Abdomen is flat. There is no distension.      Palpations: Abdomen is soft.      Tenderness: There is no abdominal tenderness.   Musculoskeletal:         General: No deformity. Normal range of motion.      Cervical back: Normal range of motion and neck supple.      Right lower leg: No edema.      Left lower leg: No edema.   Lymphadenopathy:      Lower Body: No right inguinal adenopathy. No left inguinal adenopathy.   Neurological:      Mental Status: She is alert and oriented to person, place, and time.   Skin:     General: Skin is warm and dry.      Findings: No erythema.   Psychiatric:         Behavior: Behavior normal.         Thought Content: Thought content normal.         Judgment: Judgment normal.   Vitals reviewed. Exam conducted with a chaperone present.               Assessment     Diagnoses and all orders for this visit:    1. Encounter for gynecological examination without abnormal finding (Primary)    2. Nipple retraction  -     Mammo Screening Digital Tomosynthesis Bilateral With CAD; Future  -     Ambulatory Referral to General Surgery  -     US Breast Left Complete    GYN exam   BP good   Pap up to date.     Noted right nipple with retraction. No mass felt on exam. She is unaware of this and has not seen prior. Has had breast surgery so could be post op, but since this is essentially new. Check MMG, limited ultrasound (per UTD) and see Dr. Perez to ensure nothing more to consider. Likely normal, but want to check.      Plan     1) Breast Health - Clinical breast exam yearly, Discussed American cancer society recommendations for breast cancer screening, and  Self breast awareness monthly  CBE See above   2) Pap - Up to date   3) Smoking status- Non-Smoker   4) Eat a balanced diet including regular green vegetables.   5) Follow up prn and one year.       Amor Michele MD   6/20/2025  16:22 EDT

## 2025-06-23 DIAGNOSIS — N64.53 RETRACTION OF RIGHT NIPPLE: Primary | ICD-10-CM

## 2025-07-09 ENCOUNTER — APPOINTMENT (OUTPATIENT)
Dept: WOMENS IMAGING | Facility: HOSPITAL | Age: 35
End: 2025-07-09
Payer: COMMERCIAL

## 2025-07-09 PROCEDURE — 76642 ULTRASOUND BREAST LIMITED: CPT | Performed by: RADIOLOGY

## 2025-07-09 PROCEDURE — G0279 TOMOSYNTHESIS, MAMMO: HCPCS | Performed by: RADIOLOGY

## 2025-07-09 PROCEDURE — 77066 DX MAMMO INCL CAD BI: CPT | Performed by: RADIOLOGY

## 2025-07-09 PROCEDURE — 77062 BREAST TOMOSYNTHESIS BI: CPT | Performed by: RADIOLOGY

## 2025-07-15 ENCOUNTER — OFFICE VISIT (OUTPATIENT)
Age: 35
End: 2025-07-15
Payer: COMMERCIAL

## 2025-07-15 VITALS
HEIGHT: 65 IN | BODY MASS INDEX: 45.32 KG/M2 | WEIGHT: 272 LBS | DIASTOLIC BLOOD PRESSURE: 80 MMHG | OXYGEN SATURATION: 99 % | HEART RATE: 72 BPM | SYSTOLIC BLOOD PRESSURE: 128 MMHG

## 2025-07-15 DIAGNOSIS — R42 DIZZINESS: Primary | ICD-10-CM

## 2025-07-15 PROCEDURE — 93000 ELECTROCARDIOGRAM COMPLETE: CPT | Performed by: STUDENT IN AN ORGANIZED HEALTH CARE EDUCATION/TRAINING PROGRAM

## 2025-07-15 PROCEDURE — 99204 OFFICE O/P NEW MOD 45 MIN: CPT | Performed by: STUDENT IN AN ORGANIZED HEALTH CARE EDUCATION/TRAINING PROGRAM

## 2025-07-15 NOTE — PROGRESS NOTES
Subjective:     Encounter Date:07/15/2025      Patient ID: Wanda Solis is a 35 y.o. female.    Chief Complaint:  dizziness    HPI:   35 y.o. female who presents for evaluation of dizziness.  Patient states that she had episodes of dizzy this last year after an ear infection.  She had an MRI of her brain at that time that was normal.  She notes that her symptoms eventually resolved on their own.  She had been in her usual state of health until a couple of weeks ago when she states her symptoms returned.  She states that when she can feel weird.  She also feels dizzy when she stands up.  She denies any syncopal events.      The following portions of the patient's history were reviewed and updated as appropriate: allergies, current medications, past family history, past medical history, past social history, past surgical history and problem list.     REVIEW OF SYSTEMS:   All systems reviewed.  Pertinent positives identified in HPI.  All other systems are negative.    Past Medical History:   Diagnosis Date    Abnormal Pap smear of cervix     follow up wnl     Anemia     Hidradenitis suppurativa     Obesity affecting pregnancy in third trimester 12/14/2017    Obesity in pregnancy, antepartum, third trimester 12/14/2017    Postpartum care and examination immediately after delivery 12/15/2017    Supervision of normal pregnancy in third trimester 2/1/2019    Urogenital trichomoniasis        Family History   Problem Relation Age of Onset    Colon cancer Father     Heart disease Father     No Known Problems Mother     Breast cancer Neg Hx     Ovarian cancer Neg Hx     Uterine cancer Neg Hx     Deep vein thrombosis Neg Hx     Pulmonary embolism Neg Hx        Social History     Socioeconomic History    Marital status: Single   Tobacco Use    Smoking status: Never     Passive exposure: Never    Smokeless tobacco: Never   Vaping Use    Vaping status: Never Used   Substance and Sexual Activity    Alcohol use: No    Drug  use: No    Sexual activity: Not Currently     Partners: Male     Birth control/protection: None       No Known Allergies    Past Surgical History:   Procedure Laterality Date    DILATATION AND CURETTAGE      INCISION AND DRAINAGE PERIRECTAL ABSCESS Left 9/1/2018    Procedure: INCISION AND DRAINAGE OF LEFT BREAST;  Surgeon: Parvez Long MD;  Location: [facility];  Service: General    WISDOM TOOTH EXTRACTION           ECG 12 Lead    Date/Time: 7/15/2025 3:05 PM  Performed by: Brock Molina MD    Authorized by: Brock Molina MD  Comparison: compared with previous ECG from 12/17/2023  Similar to previous ECG  Rhythm: sinus rhythm  Rate: normal  Conduction: conduction normal  QRS axis: normal    Clinical impression: normal ECG             Objective:         Vitals:    07/15/25 1450   BP: 128/80   Pulse: 72   SpO2: 99%       PHYSICAL EXAM:  GEN: well appearing, in NAD   HEENT: NCAT, EOMI, moist mucus membranes   Respiratory: CTAB, no wheezes, rales or rhonchi  CV: normal rate, regular rhythm, normal S1, S2, no murmurs, rubs, gallops, +2 radial pulses b/l  GI: soft, nontender, nondistended  MSK: no edema  Skin: no rash, warm, dry  Heme/Lymph: no bruising or bleeding  Neuro: Alert and Oriented x 3, grossly normal motor function        Assessment:         (R42) Dizziness - Plan: ECG 12 Lead    35 y.o. female who presents for evaluation of dizziness.        Plan:       #Dizziness  Unclear etiology.  Sound classic for vertigo.  There are episodes of what sound like orthostasis.  Her MRI brain last year was normal.  Her EKG today is normal.  Would recommend compression stockings, adequate hydration, increase salt intake and reassess.  She can also try meclizine as needed which was prescribed by her PCP.    Carl Greenberg NP, thank you very much for referring this kind patient to me. Please call me with any questions or concerns. I will see the patient again in the office as needed         Brock Zamora MD, FACC,  FSCAI  07/15/25  [facility]    Outpatient Encounter Medications as of 7/15/2025   Medication Sig Dispense Refill    ferrous sulfate 325 (65 FE) MG tablet Take 1 tablet by mouth Daily. 90 tablet 1    meclizine (ANTIVERT) 25 MG tablet Take 1 tablet by mouth 3 (Three) Times a Day As Needed for Dizziness. 30 tablet 0    vitamin D (ERGOCALCIFEROL) 1.25 MG (11657 UT) capsule capsule Take 1 capsule by mouth Every 14 (Fourteen) Days. 6 capsule 1    [DISCONTINUED] oxybutynin XL (DITROPAN-XL) 5 MG 24 hr tablet        No facility-administered encounter medications on file as of 7/15/2025.

## 2025-07-23 ENCOUNTER — HOSPITAL ENCOUNTER (OUTPATIENT)
Facility: HOSPITAL | Age: 35
Discharge: HOME | End: 2025-07-23
Admitting: NURSE PRACTITIONER
Payer: COMMERCIAL

## 2025-07-23 DIAGNOSIS — R42 DIZZINESS: ICD-10-CM

## 2025-07-23 PROCEDURE — 70496 CT ANGIOGRAPHY HEAD: CPT

## 2025-07-23 PROCEDURE — 25510000001 IOPAMIDOL PER 1 ML: Performed by: NURSE PRACTITIONER

## 2025-07-23 PROCEDURE — 70498 CT ANGIOGRAPHY NECK: CPT

## 2025-07-23 RX ORDER — IOPAMIDOL 755 MG/ML
100 INJECTION, SOLUTION INTRAVASCULAR
Status: COMPLETED | OUTPATIENT
Start: 2025-07-23 | End: 2025-07-23

## 2025-07-23 RX ADMIN — IOPAMIDOL 95 ML: 755 INJECTION, SOLUTION INTRAVENOUS at 09:11

## 2025-08-05 ENCOUNTER — HOSPITAL ENCOUNTER (OUTPATIENT)
Dept: SLEEP MEDICINE | Facility: HOSPITAL | Age: 35
Discharge: HOME | End: 2025-08-05
Admitting: NURSE PRACTITIONER
Payer: COMMERCIAL

## 2025-08-05 DIAGNOSIS — G47.8 NON-RESTORATIVE SLEEP: ICD-10-CM

## 2025-08-05 DIAGNOSIS — G47.9 SLEEP DISTURBANCE: ICD-10-CM

## 2025-08-05 DIAGNOSIS — R29.818 SUSPECTED SLEEP APNEA: ICD-10-CM

## 2025-08-05 DIAGNOSIS — E66.01 SEVERE OBESITY (BMI >= 40): ICD-10-CM

## 2025-08-05 PROCEDURE — 95810 POLYSOM 6/> YRS 4/> PARAM: CPT

## 2025-08-08 ENCOUNTER — TELEPHONE (OUTPATIENT)
Dept: SLEEP MEDICINE | Facility: HOSPITAL | Age: 35
End: 2025-08-08
Payer: COMMERCIAL

## (undated) DEVICE — DRAPE,LITHOTOMY,ATTACHED,STERILE: Brand: MEDLINE

## (undated) DEVICE — LOU MINOR PROCEDURE: Brand: MEDLINE INDUSTRIES, INC.

## (undated) DEVICE — GAUZE,PACKING STRIP,IODOFORM,1/2"X5YD,ST: Brand: CURAD

## (undated) DEVICE — DRSNG PAD ABD 8X10IN STRL

## (undated) DEVICE — IRRIGATOR BULB ASEPTO 60CC STRL

## (undated) DEVICE — SKIN PREP TRAY W/CHG: Brand: MEDLINE INDUSTRIES, INC.

## (undated) DEVICE — GLV SURG BIOGEL LTX PF 7

## (undated) DEVICE — SPNG GZ WOVN 4X4IN 12PLY 10/BX STRL

## (undated) DEVICE — SPNG LAP 18X18IN LF STRL PK/5